# Patient Record
Sex: MALE | Race: WHITE | NOT HISPANIC OR LATINO | Employment: OTHER | URBAN - METROPOLITAN AREA
[De-identification: names, ages, dates, MRNs, and addresses within clinical notes are randomized per-mention and may not be internally consistent; named-entity substitution may affect disease eponyms.]

---

## 2017-01-17 ENCOUNTER — CLINICAL SUPPORT (OUTPATIENT)
Dept: AUDIOLOGY | Facility: CLINIC | Age: 82
End: 2017-01-17

## 2017-01-17 DIAGNOSIS — H90.3 SENSORINEURAL HEARING LOSS, BILATERAL: Primary | ICD-10-CM

## 2017-01-17 PROCEDURE — 99499 UNLISTED E&M SERVICE: CPT | Mod: S$GLB,,, | Performed by: OTOLARYNGOLOGY

## 2017-01-17 NOTE — PROGRESS NOTES
Leonid Delacruz was seen in the clinic today for a hearing aid follow-up.    Mr. Delacruz reported that his batteries were only lasting a few days. He was instructed that after he pulled the tab off the battery, to let it sit for 2 minutes before putting the battery in the hearing aid. If Mr. Delacruz is still having excessive battery drain, we will send the hearing aids in for repair since they are still under warranty. Mr. Delacruz also reported he was not hearing as well from the right hearing aid. Input was increased but Mr. Delacruz still could not tell a difference. Compression was changed to semi-linear and Mr. Delacruz stated this sounded better.     He will call the clinic for a follow-up appointment as needed.

## 2017-02-03 ENCOUNTER — OFFICE VISIT (OUTPATIENT)
Dept: INTERNAL MEDICINE | Facility: CLINIC | Age: 82
End: 2017-02-03
Payer: MEDICARE

## 2017-02-03 VITALS
SYSTOLIC BLOOD PRESSURE: 145 MMHG | WEIGHT: 206.13 LBS | DIASTOLIC BLOOD PRESSURE: 83 MMHG | TEMPERATURE: 98 F | BODY MASS INDEX: 27.92 KG/M2 | HEART RATE: 94 BPM | HEIGHT: 72 IN

## 2017-02-03 DIAGNOSIS — M47.816 LUMBAR ARTHROPATHY: ICD-10-CM

## 2017-02-03 DIAGNOSIS — I27.20 PULMONARY HYPERTENSION: ICD-10-CM

## 2017-02-03 DIAGNOSIS — N18.30 CKD (CHRONIC KIDNEY DISEASE) STAGE 3, GFR 30-59 ML/MIN: ICD-10-CM

## 2017-02-03 DIAGNOSIS — M48.56XA COMPRESSION FRACTURE OF LUMBAR SPINE, NON-TRAUMATIC, INITIAL ENCOUNTER: ICD-10-CM

## 2017-02-03 DIAGNOSIS — M51.37 DEGENERATION OF LUMBAR OR LUMBOSACRAL INTERVERTEBRAL DISC: ICD-10-CM

## 2017-02-03 DIAGNOSIS — J44.9 CHRONIC OBSTRUCTIVE PULMONARY DISEASE, UNSPECIFIED COPD TYPE: ICD-10-CM

## 2017-02-03 DIAGNOSIS — M43.10 ACQUIRED SPONDYLOLISTHESIS: ICD-10-CM

## 2017-02-03 DIAGNOSIS — E78.5 DYSLIPIDEMIA: ICD-10-CM

## 2017-02-03 DIAGNOSIS — D69.2 SENILE PURPURA: ICD-10-CM

## 2017-02-03 DIAGNOSIS — I70.0 ATHEROSCLEROSIS OF AORTA: ICD-10-CM

## 2017-02-03 DIAGNOSIS — J43.9 PULMONARY EMPHYSEMA, UNSPECIFIED EMPHYSEMA TYPE: Chronic | ICD-10-CM

## 2017-02-03 DIAGNOSIS — N40.0 BENIGN NON-NODULAR PROSTATIC HYPERPLASIA WITHOUT LOWER URINARY TRACT SYMPTOMS: ICD-10-CM

## 2017-02-03 DIAGNOSIS — K21.9 GASTROESOPHAGEAL REFLUX DISEASE WITH HIATAL HERNIA: ICD-10-CM

## 2017-02-03 DIAGNOSIS — F33.1 MDD (MAJOR DEPRESSIVE DISORDER), RECURRENT EPISODE, MODERATE: ICD-10-CM

## 2017-02-03 DIAGNOSIS — T83.490S MALFUNCTION OF PENILE PROSTHESIS, SEQUELA: ICD-10-CM

## 2017-02-03 DIAGNOSIS — K44.9 GASTROESOPHAGEAL REFLUX DISEASE WITH HIATAL HERNIA: ICD-10-CM

## 2017-02-03 DIAGNOSIS — Z00.00 ANNUAL PHYSICAL EXAM: Primary | ICD-10-CM

## 2017-02-03 DIAGNOSIS — R42 VERTIGO: ICD-10-CM

## 2017-02-03 DIAGNOSIS — I50.32 CHRONIC DIASTOLIC HEART FAILURE: ICD-10-CM

## 2017-02-03 PROCEDURE — G0009 ADMIN PNEUMOCOCCAL VACCINE: HCPCS | Mod: S$GLB,,, | Performed by: INTERNAL MEDICINE

## 2017-02-03 PROCEDURE — 99999 PR PBB SHADOW E&M-EST. PATIENT-LVL III: CPT | Mod: PBBFAC,,, | Performed by: INTERNAL MEDICINE

## 2017-02-03 PROCEDURE — 99499 UNLISTED E&M SERVICE: CPT | Mod: S$GLB,,, | Performed by: INTERNAL MEDICINE

## 2017-02-03 PROCEDURE — 99397 PER PM REEVAL EST PAT 65+ YR: CPT | Mod: S$GLB,,, | Performed by: INTERNAL MEDICINE

## 2017-02-03 PROCEDURE — 90670 PCV13 VACCINE IM: CPT | Mod: S$GLB,,, | Performed by: INTERNAL MEDICINE

## 2017-02-03 RX ORDER — NAPROXEN SODIUM 220 MG/1
81 TABLET, FILM COATED ORAL DAILY
COMMUNITY

## 2017-02-03 RX ORDER — TRAMADOL HYDROCHLORIDE 50 MG/1
TABLET ORAL
Qty: 60 TABLET | Refills: 2 | Status: SHIPPED | OUTPATIENT
Start: 2017-02-03 | End: 2017-04-27 | Stop reason: SDUPTHER

## 2017-02-03 NOTE — MR AVS SNAPSHOT
[facility]   [facility]  [place] [address]  Phone: [phone]  Fax: [phone]                  Leonid FORTE Delacruz    2/3/2017 8:20 AM   Office Visit    Description:  Male : 1934   Provider:  Sam Parkinson DO   Department:  [facility]           Reason for Visit     lethargic           Diagnoses this Visit        Comments    Annual physical exam    -  Primary     Chronic obstructive pulmonary disease, unspecified COPD type         Chronic diastolic heart failure         Pulmonary hypertension         Pulmonary emphysema, unspecified emphysema type         CKD (chronic kidney disease) stage 3, GFR 30-59 ml/min         MDD (major depressive disorder), recurrent episode, moderate         Dyslipidemia         Atherosclerosis of aorta         Senile purpura         Malfunction of penile prosthesis, sequela         Vertigo         Lumbar arthropathy         Compression fracture of lumbar spine, non-traumatic, initial encounter         Aortic sclerosis         Degeneration of lumbar or lumbosacral intervertebral disc         Acquired spondylolisthesis         Benign non-nodular prostatic hyperplasia without lower urinary tract symptoms         Gastroesophageal reflux disease with hiatal hernia                To Do List           Future Appointments        Provider Department Dept Phone    2017 10:45 AM [facility] - Laboratory [phone]    2017 11:00 AM [facility], [facility] - Specimen Lab [phone]    2017 8:40 AM aSm Parkinson DO [facility] [phone]      Goals (5 Years of Data)     None      Follow-Up and Disposition     Return in about 4 months (around 6/3/2017).       These Medications        Disp Refills Start End    tramadol (ULTRAM) 50 mg tablet 60 tablet 2 2/3/2017     Take 1-2 tabs PO q8 hrs PRN    Pharmacy: [id number] - [place] - [phone]  [facility] AT [facility] [address] [facility] Ph #: [phone]         [facility] On Call     [facility] On Call Nurse Care Line - 24/7 Assistance  Registered nurses in the [facility] On Call Center provide clinical advisement, health education, appointment booking, and other advisory services.  Call for this free service at [phone].             Medications           Message regarding Medications     Verify the changes and/or additions to your medication regime listed below are the same as discussed with your clinician today.  If any of these changes or additions are incorrect, please notify your healthcare provider.        STOP taking these medications     predniSONE (DELTASONE) 10 MG tablet Three tablets daily x 7 days, 2 tablets daily x 7 days, stop    naproxen sodium 220 mg Cap Take 220 mg by mouth as needed.           Verify that the below list of medications is an accurate representation of the medications you are currently taking.  If none reported, the list may be blank. If incorrect, please contact your healthcare provider. Carry this list with you in case of emergency.           Current Medications     albuterol (PROAIR HFA) 90 mcg/actuation inhaler 2 HFA Aerosol Inhaler Inhalation Four times a day p;rn    aspirin 81 MG Chew Take 81 mg by mouth once daily.    meclizine (ANTIVERT) 25 mg tablet TAKE 1 TABLET BY MOUTH THREE TIMES DAILY AS NEEDED FOR DIZZINESS    omeprazole (PRILOSEC) 40 MG capsule Take 1 capsule (40 mg total) by mouth 2 (two) times daily before meals.    sertraline (ZOLOFT) 100 MG tablet TAKE 1 TABLET(100 MG) BY MOUTH EVERY DAY    SYMBICORT 160-4.5 mcg/actuation HFAA INHALE 2 PUFFS INTO LUNGS EVERY 12 HOURS    SYMBICORT 160-4.5 mcg/actuation HFAA INHALE 2 PUFFS BY MOUTH TWICE DAILY    tamsulosin (FLOMAX) 0.4 mg Cp24 TAKE 1 CAPSULE BY MOUTH ONCE DAILY    tramadol (ULTRAM) 50 mg tablet Take 1-2 tabs PO q8 hrs PRN           Clinical Reference Information           Your Vitals Were      BP Pulse Temp Height Weight BMI    145/83 (BP Location: Right arm, Patient Position: Sitting, BP Method: Automatic) 94 97.7 °F (36.5 °C) (Oral) 6' (1.829 m) 93.5 kg (206 lb 2.1 oz) 27.96 kg/m2      Blood Pressure          Most Recent Value    BP  (!)  145/83      Allergies as of 2/3/2017     No Known Allergies      Immunizations Administered on Date of Encounter - 2/3/2017     Name Date Dose VIS Date Route    Pneumococcal Conjugate - 13 Valent 2/3/2017 0.5 mL 11/5/2015 Intramuscular      Orders Placed During Today's Visit      Normal Orders This Visit    Pneumococcal Conjugate Vaccine (13 Valent) (IM)     Future Labs/Procedures Expected by Expires    CBC auto differential  2/3/2017 2/3/2018    Lipid panel  2/3/2017 2/3/2018    TSH  2/3/2017 4/4/2018    Comprehensive metabolic panel  5/5/2017 5/5/2018    Urinalysis  As directed 2/3/2018      Language Assistance Services     ATTENTION: Language assistance services are available, free of charge. Please call [phone].      ATENCIÓN: Si habla ld, tiene a dunbar disposición servicios gratuitos de asistencia lingüística. Llame al [phone].     CHÚ Ý: N?u b?n nói Ti?ng Vi?t, có các d?ch v? h? tr? ngôn ng? mi?n phí dành cho b?n. G?i s? [phone].         [facility] complies with applicable Federal civil rights laws and does not discriminate on the basis of race, color, national origin, age, disability, or sex.

## 2017-02-03 NOTE — PROGRESS NOTES
Subjective:       Patient ID: Leonid Delacruz Jr. is a 82 y.o. male.    Chief Complaint: lethargic    HPI   82 y.o. Male with COPD/Emphysema, chronic diastolic heart failure, CKD III, MDD, Aortic atherosclerosis, senile purpura, vertigo, malfunction of penile prosthesis, Lumbar arthropathy with L1 compression fracture, aortic sclerosis, BPH, GERD here for annual exam.     Cholesterol: (needs)  Vaccines: Influenza (declined); Tetanus (2015); Pneumovax (2017); Zoster (declined)  Eye exam: 2016  Colonoscopy: 2011- pt declines repeat    Exercise: no  Diet: regular    Past Medical History:    Amblyopia, strabismic - Left Eye                1/7/2014      Anxiety                                                       AR (allergic rhinitis)                                        Cataract                                                      COPD (chronic obstructive pulmonary disease)                  Gastroesophageal reflux disease with hiatal he*               History of bleeding peptic ulcer                5/02/2010     History of gastritis                            9/22/2009       Comment:with hemorrhage    MDD (major depressive disorder), recurrent epi* 3/3/2016      Osteoarthritis                                                PUD (peptic ulcer disease)                                      Comment:stomach  Past Surgical History:    CATARACT EXTRACTION W/  INTRAOCULAR LENS IMPLA*  8/20/2001*    LAMINOTOMY                                       5/20/2003       Comment:Left L4-L5    APPENDECTOMY                                                   TONSILLECTOMY, ADENOIDECTOMY                                   PENILE PROSTHESIS IMPLANT                                      VASECTOMY                                                      ROTATOR CUFF REPAIR                                              Comment:Bilateral    COLONOSCOPY                                      6/06/2011     ESOPHAGOGASTRODUODENOSCOPY                        5/02/2010*  Social History    Marital status:              Spouse name: Ting                 Years of education:                 Number of children: 2             Occupational History  Occupation          Employer            Comment               Retired                                     Social History Main Topics    Smoking status: Never Smoker                                                                Smokeless status: Never Used                        Alcohol use: Yes           4.2 oz/week       7 Cans of beer per week       Comment: 7-9 beers a week    Drug use: No              Sexual activity: Yes               Partners with: Female    Review of patient's allergies indicates:  No Known Allergies  Mr. Delacruz does not currently have medications on file.    Review of Systems   Constitutional: Negative for activity change, appetite change, chills, diaphoresis, fatigue, fever and unexpected weight change.   HENT: Negative for congestion, mouth sores, postnasal drip, rhinorrhea, sinus pressure, sneezing, sore throat, trouble swallowing and voice change.    Eyes: Negative for discharge, itching and visual disturbance.   Respiratory: Negative for cough, chest tightness, shortness of breath and wheezing.    Cardiovascular: Negative for chest pain, palpitations and leg swelling.   Gastrointestinal: Negative for abdominal pain, blood in stool, constipation, diarrhea, nausea and vomiting.   Endocrine: Negative for cold intolerance and heat intolerance.   Genitourinary: Negative for difficulty urinating, dysuria, flank pain, hematuria and urgency.   Musculoskeletal: Negative for arthralgias, back pain, myalgias and neck pain.   Skin: Negative for rash and wound.   Allergic/Immunologic: Negative for environmental allergies and food allergies.   Neurological: Negative for dizziness, tremors, seizures, syncope, weakness and headaches.   Hematological: Negative for adenopathy. Does not bruise/bleed easily.    Psychiatric/Behavioral: Negative for confusion, sleep disturbance and suicidal ideas. The patient is not nervous/anxious.        Objective:      Physical Exam   Constitutional: He is oriented to person, place, and time. He appears well-developed and well-nourished. No distress.   HENT:   Head: Normocephalic and atraumatic.   Right Ear: External ear normal.   Left Ear: External ear normal.   Nose: Nose normal.   Mouth/Throat: Oropharynx is clear and moist. No oropharyngeal exudate.   Eyes: Conjunctivae and EOM are normal. Pupils are equal, round, and reactive to light. Right eye exhibits no discharge. Left eye exhibits no discharge. No scleral icterus.   Neck: Normal range of motion. Neck supple. No JVD present. No thyromegaly present.   Cardiovascular: Normal rate, regular rhythm, normal heart sounds and intact distal pulses.    No murmur heard.  Pulmonary/Chest: Effort normal and breath sounds normal. No respiratory distress. He has no wheezes. He has no rales.   Abdominal: Soft. Bowel sounds are normal. He exhibits no distension. There is no tenderness. There is no guarding.   Musculoskeletal: He exhibits no edema.   Lymphadenopathy:     He has no cervical adenopathy.   Neurological: He is alert and oriented to person, place, and time.   Skin: Skin is warm and dry. No rash noted. He is not diaphoretic. No pallor.   Psychiatric: He has a normal mood and affect. Judgment normal.       Assessment:       1. Annual physical exam    2. Chronic obstructive pulmonary disease, unspecified COPD type    3. Chronic diastolic heart failure    4. Pulmonary hypertension    5. Pulmonary emphysema, unspecified emphysema type    6. CKD (chronic kidney disease) stage 3, GFR 30-59 ml/min    7. MDD (major depressive disorder), recurrent episode, moderate    8. Dyslipidemia    9. Atherosclerosis of aorta    10. Senile purpura    11. Malfunction of penile prosthesis, sequela    12. Vertigo    13. Lumbar arthropathy    14. Compression  fracture of lumbar spine, non-traumatic, initial encounter    15. Aortic sclerosis    16. Degeneration of lumbar or lumbosacral intervertebral disc    17. Acquired spondylolisthesis    18. Benign non-nodular prostatic hyperplasia without lower urinary tract symptoms    19. Gastroesophageal reflux disease with hiatal hernia        Plan:    Complete blood work, UA   Vaccines: Influenza (declined); Tetanus (2015); Pneumovax (2017); Zoster (declined)   Eye exam: 2016   Colonoscopy: 2011- pt declines repeat     1. COPD- fair control on Symbicort BID   2. Chronic diastolic heart failure with mule pulm HTN- stable, no S/S of heart failure   3. CKD III- stable, no NSAIDs   4. MDD- stable on Zoloft 100 mg daily   5. BPPV- fair control on Meclizine   6. Aortic atherosclerosis- stable on ASA   7. Senile purpura- stable, continue to monitor    8. Malfunction of penile prosthesis- stable, followed by Urology   9. Lumbar arthropathy/ L1 compression fracture- stable on Tramadol PRN   10. Aortic sclerosis- stable, continue to monitor   11. BPH- stable on Flomax  12. GERD- stable on Prilosec  13. F/u in 4 months or PRN

## 2017-02-04 ENCOUNTER — LAB VISIT (OUTPATIENT)
Dept: LAB | Facility: HOSPITAL | Age: 82
End: 2017-02-04
Attending: INTERNAL MEDICINE
Payer: MEDICARE

## 2017-02-04 DIAGNOSIS — F33.1 MDD (MAJOR DEPRESSIVE DISORDER), RECURRENT EPISODE, MODERATE: ICD-10-CM

## 2017-02-04 DIAGNOSIS — J44.9 CHRONIC OBSTRUCTIVE PULMONARY DISEASE, UNSPECIFIED COPD TYPE: ICD-10-CM

## 2017-02-04 DIAGNOSIS — J43.9 PULMONARY EMPHYSEMA, UNSPECIFIED EMPHYSEMA TYPE: Chronic | ICD-10-CM

## 2017-02-04 DIAGNOSIS — E78.5 DYSLIPIDEMIA: ICD-10-CM

## 2017-02-04 DIAGNOSIS — N18.30 CKD (CHRONIC KIDNEY DISEASE) STAGE 3, GFR 30-59 ML/MIN: ICD-10-CM

## 2017-02-04 LAB
ALBUMIN SERPL BCP-MCNC: 3.6 G/DL
ALP SERPL-CCNC: 94 U/L
ALT SERPL W/O P-5'-P-CCNC: 14 U/L
ANION GAP SERPL CALC-SCNC: 6 MMOL/L
AST SERPL-CCNC: 23 U/L
BASOPHILS # BLD AUTO: 0.03 K/UL
BASOPHILS NFR BLD: 0.4 %
BILIRUB SERPL-MCNC: 0.5 MG/DL
BUN SERPL-MCNC: 17 MG/DL
CALCIUM SERPL-MCNC: 8.8 MG/DL
CHLORIDE SERPL-SCNC: 104 MMOL/L
CHOLEST/HDLC SERPL: 2.2 {RATIO}
CO2 SERPL-SCNC: 26 MMOL/L
CREAT SERPL-MCNC: 1.6 MG/DL
DIFFERENTIAL METHOD: ABNORMAL
EOSINOPHIL # BLD AUTO: 0.1 K/UL
EOSINOPHIL NFR BLD: 2 %
ERYTHROCYTE [DISTWIDTH] IN BLOOD BY AUTOMATED COUNT: 16.8 %
EST. GFR  (AFRICAN AMERICAN): 45.7 ML/MIN/1.73 M^2
EST. GFR  (NON AFRICAN AMERICAN): 39.5 ML/MIN/1.73 M^2
GLUCOSE SERPL-MCNC: 102 MG/DL
HCT VFR BLD AUTO: 37.6 %
HDL/CHOLESTEROL RATIO: 45.9 %
HDLC SERPL-MCNC: 181 MG/DL
HDLC SERPL-MCNC: 83 MG/DL
HGB BLD-MCNC: 12.1 G/DL
LDLC SERPL CALC-MCNC: 82.8 MG/DL
LYMPHOCYTES # BLD AUTO: 2.2 K/UL
LYMPHOCYTES NFR BLD: 30.9 %
MCH RBC QN AUTO: 28.7 PG
MCHC RBC AUTO-ENTMCNC: 32.2 %
MCV RBC AUTO: 89 FL
MONOCYTES # BLD AUTO: 0.8 K/UL
MONOCYTES NFR BLD: 11.6 %
NEUTROPHILS # BLD AUTO: 3.8 K/UL
NEUTROPHILS NFR BLD: 54.8 %
NONHDLC SERPL-MCNC: 98 MG/DL
PLATELET # BLD AUTO: 324 K/UL
PMV BLD AUTO: 9.7 FL
POTASSIUM SERPL-SCNC: 4.7 MMOL/L
PROT SERPL-MCNC: 7.3 G/DL
RBC # BLD AUTO: 4.22 M/UL
SODIUM SERPL-SCNC: 136 MMOL/L
T4 FREE SERPL-MCNC: 0.79 NG/DL
TRIGL SERPL-MCNC: 76 MG/DL
TSH SERPL DL<=0.005 MIU/L-ACNC: 4.01 UIU/ML
WBC # BLD AUTO: 6.99 K/UL

## 2017-02-04 PROCEDURE — 80061 LIPID PANEL: CPT

## 2017-02-04 PROCEDURE — 84443 ASSAY THYROID STIM HORMONE: CPT

## 2017-02-04 PROCEDURE — 84439 ASSAY OF FREE THYROXINE: CPT

## 2017-02-04 PROCEDURE — 80053 COMPREHEN METABOLIC PANEL: CPT

## 2017-02-04 PROCEDURE — 36415 COLL VENOUS BLD VENIPUNCTURE: CPT | Mod: PO

## 2017-02-04 PROCEDURE — 85025 COMPLETE CBC W/AUTO DIFF WBC: CPT

## 2017-02-24 DIAGNOSIS — J43.8 OTHER EMPHYSEMA: Primary | ICD-10-CM

## 2017-03-22 DIAGNOSIS — J44.9 CHRONIC OBSTRUCTIVE PULMONARY DISEASE, UNSPECIFIED COPD TYPE: Primary | ICD-10-CM

## 2017-03-27 ENCOUNTER — HOSPITAL ENCOUNTER (OUTPATIENT)
Dept: RADIOLOGY | Facility: HOSPITAL | Age: 82
Discharge: HOME | End: 2017-03-27
Attending: INTERNAL MEDICINE
Payer: MEDICARE

## 2017-03-27 ENCOUNTER — OFFICE VISIT (OUTPATIENT)
Dept: PULMONOLOGY | Facility: CLINIC | Age: 82
End: 2017-03-27
Payer: MEDICARE

## 2017-03-27 ENCOUNTER — HOSPITAL ENCOUNTER (OUTPATIENT)
Dept: PULMONOLOGY | Facility: CLINIC | Age: 82
Discharge: HOME | End: 2017-03-27
Payer: MEDICARE

## 2017-03-27 VITALS
SYSTOLIC BLOOD PRESSURE: 132 MMHG | DIASTOLIC BLOOD PRESSURE: 70 MMHG | BODY MASS INDEX: 29.35 KG/M2 | WEIGHT: 205 LBS | HEART RATE: 95 BPM | OXYGEN SATURATION: 93 % | HEIGHT: 70 IN

## 2017-03-27 DIAGNOSIS — J44.9 CHRONIC OBSTRUCTIVE PULMONARY DISEASE, UNSPECIFIED COPD TYPE: ICD-10-CM

## 2017-03-27 DIAGNOSIS — J43.2 CENTRILOBULAR EMPHYSEMA: Primary | ICD-10-CM

## 2017-03-27 LAB
PRE FEV1 FVC: 67
PRE FEV1: 1.51
PRE FVC: 2.24
PREDICTED FEV1 FVC: 77
PREDICTED FEV1: 2.93
PREDICTED FVC: 3.76

## 2017-03-27 PROCEDURE — 99999 PR PBB SHADOW E&M-EST. PATIENT-LVL III: CPT | Mod: PBBFAC,,, | Performed by: INTERNAL MEDICINE

## 2017-03-27 PROCEDURE — 1125F AMNT PAIN NOTED PAIN PRSNT: CPT | Mod: S$GLB,,, | Performed by: INTERNAL MEDICINE

## 2017-03-27 PROCEDURE — 99499 UNLISTED E&M SERVICE: CPT | Mod: S$GLB,,, | Performed by: INTERNAL MEDICINE

## 2017-03-27 PROCEDURE — 1159F MED LIST DOCD IN RCRD: CPT | Mod: S$GLB,,, | Performed by: INTERNAL MEDICINE

## 2017-03-27 PROCEDURE — 99214 OFFICE O/P EST MOD 30 MIN: CPT | Mod: S$GLB,,, | Performed by: INTERNAL MEDICINE

## 2017-03-27 PROCEDURE — 71020 XR CHEST PA AND LATERAL: CPT | Mod: TC

## 2017-03-27 PROCEDURE — 1160F RVW MEDS BY RX/DR IN RCRD: CPT | Mod: S$GLB,,, | Performed by: INTERNAL MEDICINE

## 2017-03-27 PROCEDURE — 94010 BREATHING CAPACITY TEST: CPT | Mod: S$GLB,,, | Performed by: INTERNAL MEDICINE

## 2017-03-27 PROCEDURE — 71020 XR CHEST PA AND LATERAL: CPT | Mod: 26,,, | Performed by: RADIOLOGY

## 2017-03-27 PROCEDURE — 1157F ADVNC CARE PLAN IN RCRD: CPT | Mod: S$GLB,,, | Performed by: INTERNAL MEDICINE

## 2017-03-28 DIAGNOSIS — R93.89 ABNORMAL CHEST CT: ICD-10-CM

## 2017-03-28 DIAGNOSIS — J44.9 CHRONIC OBSTRUCTIVE PULMONARY DISEASE, UNSPECIFIED COPD TYPE: Primary | ICD-10-CM

## 2017-03-28 NOTE — PROGRESS NOTES
Subjective:       Patient ID: Leonid Delacruz Jr. is a 82 y.o. male.    Chief Complaint: COPD and Shortness of Breath    HPI  81 yo male with COPD comes for a periodic visit. He is doing well, no exacerbations of his COPD. Last visit May, 2016. He has  Had  No problems with his breathing. He has a problem with vertigo and is unsteady and has fallen several times requiring sutures. He is a bit better at the current time. He has not had a chest x-ray is years. Will get one today. Fev-1 virtually identicalto last May. 1.51 liters.68% of FVC      No flowsheet data found.]  Review of Systems   Constitutional: Negative.    HENT: Negative.    Eyes: Negative.    Respiratory: Negative.         Moderate but stable COPD   Cardiovascular: Negative.         Diastolic dysfunction.   Genitourinary: Negative.    Musculoskeletal: Negative.    Skin: Negative.    Gastrointestinal: Negative.         GERD/s tends to sleep on his right side   Neurological: Negative.    Psychiatric/Behavioral: Negative.        Objective:      Physical Exam   Constitutional: He is oriented to person, place, and time. He appears well-developed and well-nourished.   HENT:   Head: Normocephalic and atraumatic.   Right Ear: External ear normal.   Left Ear: External ear normal.   Eyes: Conjunctivae and EOM are normal. Pupils are equal, round, and reactive to light.   Neck: Normal range of motion. Neck supple.   Cardiovascular: Normal rate, regular rhythm and normal heart sounds.    Pulmonary/Chest: Effort normal and breath sounds normal.   Decreased breath sounds without wheezes Sa02: 93%   Abdominal: Soft. Bowel sounds are normal.   Musculoskeletal: Normal range of motion.   Neurological: He is alert and oriented to person, place, and time. He has normal reflexes.   Skin: Skin is warm and dry.   Psychiatric: He has a normal mood and affect. His behavior is normal. Judgment and thought content normal.           Assessment:       1. Centrilobular emphysema         Outpatient Encounter Prescriptions as of 3/27/2017   Medication Sig Dispense Refill    albuterol (PROAIR HFA) 90 mcg/actuation inhaler 2 HFA Aerosol Inhaler Inhalation Four times a day p;rn 3 Inhaler 3    aspirin 81 MG Chew Take 81 mg by mouth once daily.      meclizine (ANTIVERT) 25 mg tablet TAKE 1 TABLET BY MOUTH THREE TIMES DAILY AS NEEDED FOR DIZZINESS 45 tablet 0    omeprazole (PRILOSEC) 40 MG capsule Take 1 capsule (40 mg total) by mouth 2 (two) times daily before meals. 60 capsule 6    sertraline (ZOLOFT) 100 MG tablet TAKE 1 TABLET(100 MG) BY MOUTH EVERY DAY 90 tablet 3    SYMBICORT 160-4.5 mcg/actuation HFAA INHALE 2 PUFFS INTO LUNGS EVERY 12 HOURS 10.2 g 0    tamsulosin (FLOMAX) 0.4 mg Cp24 TAKE 1 CAPSULE BY MOUTH ONCE DAILY 90 capsule 3    tramadol (ULTRAM) 50 mg tablet Take 1-2 tabs PO q8 hrs PRN 60 tablet 2    [DISCONTINUED] SYMBICORT 160-4.5 mcg/actuation HFAA INHALE 2 PUFFS BY MOUTH TWICE DAILY 10.2 g 0     No facility-administered encounter medications on file as of 3/27/2017.      Orders Placed This Encounter   Procedures    X-Ray Chest PA And Lateral     Standing Status:   Future     Standing Expiration Date:   3/27/2018     Order Specific Question:   May the Radiologist modify the order per protocol to meet the clinical needs of the patient?     Answer:   Yes     Plan:             Chest -ray shows hyperinflation. The radiologist refers to volume loss at right base, I don't appreciate any. But will get  CT because of their expressed concern.

## 2017-04-03 DIAGNOSIS — K44.9 GASTROESOPHAGEAL REFLUX DISEASE WITH HIATAL HERNIA: ICD-10-CM

## 2017-04-03 DIAGNOSIS — K21.9 GASTROESOPHAGEAL REFLUX DISEASE WITH HIATAL HERNIA: ICD-10-CM

## 2017-04-03 RX ORDER — OMEPRAZOLE 40 MG/1
CAPSULE, DELAYED RELEASE ORAL
Qty: 60 CAPSULE | Refills: 5 | Status: SHIPPED | OUTPATIENT
Start: 2017-04-03 | End: 2018-04-05 | Stop reason: SDUPTHER

## 2017-04-03 RX ORDER — BUDESONIDE AND FORMOTEROL FUMARATE DIHYDRATE 160; 4.5 UG/1; UG/1
AEROSOL RESPIRATORY (INHALATION)
Qty: 10.2 G | Refills: 0 | Status: SHIPPED | OUTPATIENT
Start: 2017-04-03 | End: 2017-06-05 | Stop reason: SDUPTHER

## 2017-04-19 ENCOUNTER — HOSPITAL ENCOUNTER (EMERGENCY)
Facility: HOSPITAL | Age: 82
Discharge: HOME | End: 2017-04-19
Attending: EMERGENCY MEDICINE | Admitting: EMERGENCY MEDICINE
Payer: MEDICARE

## 2017-04-19 VITALS
OXYGEN SATURATION: 96 % | HEIGHT: 71 IN | RESPIRATION RATE: 18 BRPM | BODY MASS INDEX: 28 KG/M2 | DIASTOLIC BLOOD PRESSURE: 79 MMHG | TEMPERATURE: 98 F | WEIGHT: 200 LBS | SYSTOLIC BLOOD PRESSURE: 194 MMHG | HEART RATE: 82 BPM

## 2017-04-19 DIAGNOSIS — M54.9 BACK PAIN: Primary | ICD-10-CM

## 2017-04-19 PROCEDURE — 99283 EMERGENCY DEPT VISIT LOW MDM: CPT

## 2017-04-19 PROCEDURE — 99284 EMERGENCY DEPT VISIT MOD MDM: CPT | Mod: ,,, | Performed by: EMERGENCY MEDICINE

## 2017-04-19 PROCEDURE — 25000003 PHARM REV CODE 250: Performed by: EMERGENCY MEDICINE

## 2017-04-19 RX ORDER — ACETAMINOPHEN 500 MG
1000 TABLET ORAL
Status: COMPLETED | OUTPATIENT
Start: 2017-04-19 | End: 2017-04-19

## 2017-04-19 RX ADMIN — ACETAMINOPHEN 1000 MG: 500 TABLET ORAL at 07:04

## 2017-04-19 NOTE — ED TRIAGE NOTES
Patient came in with a lower back pain from a trip and fall on 4/6/2017. Patient denies any LOC or hitting his head.

## 2017-04-19 NOTE — DISCHARGE INSTRUCTIONS
Back Pain (Acute or Chronic)    Back pain is one of the most common problems. The good news is that most people feel better in 1 to 2 weeks, and most of the rest in 1 to 2 months. Most people can remain active.  People experience and describe pain differently; not everyone is the same.  · The pain can be sharp, stabbing, shooting, aching, cramping or burning.  · Movement, standing, bending, lifting, sitting, or walking may worsen pain.  · It can be localized to one spot or area, or it can be more generalized.  · It can spread or radiate upwards, to the front, or go down your arms or legs (sciatica).  · It can cause muscle spasm.  Most of the time, mechanical problems with the muscles or spine cause the pain. Mechanical problems are usually caused by an injury to the muscles or ligaments. While illness can cause back pain, it is usually not caused by a serious illness. Mechanical problems include:   · Physical activity such as sports, exercise, work, or normal activity  · Overexertion, lifting, pushing, pulling incorrectly or too aggressively  · Sudden twisting, bending, or stretching from an accident, or accidental movement  · Poor posture  · Stretching or moving wrong, without noticing pain at the time  · Poor coordination, lack of regular exercise (check with your doctor about this)  · Spinal disc disease or arthritis  · Stress  Pain can also be related to pregnancy, or illness like appendicitis, bladder or kidney infections, pelvic infections, and many other things.  Acute back pain usually gets better in 1 to 2 weeks. Back pain related to disk disease, arthritis in the spinal joints or spinal stenosis (narrowing of the spinal canal) can become chronic and last for months or years.  Unless you had a physical injury (for example, a car accident or fall) X-rays are usually not needed for the initial evaluation of back pain. If pain continues and does not respond to medical treatment, X-rays and other tests may be  needed.  Home care  Try these home care recommendations:  · When in bed, try to find a position of comfort. A firm mattress is best. Try lying flat on your back with pillows under your knees. You can also try lying on your side with your knees bent up towards your chest and a pillow between your knees.  · At first, do not try to stretch out the sore spots. If there is a strain, it is not like the good soreness you get after exercising without an injury. In this case, stretching may make it worse.  · Avoid prolong sitting, long car rides, or travel. This puts more stress on the lower back than standing or walking.  · During the first 24 to 72 hours after an acute injury or flare up of chronic back pain, apply an ice pack to the painful area for 20 minutes and then remove it for 20 minutes. Do this over a period of 60 to 90 minutes or several times a day. This will reduce swelling and pain. Wrap the ice pack in a thin towel or plastic to protect your skin.  · You can start with ice, then switch to heat. Heat (hot shower, hot bath, or heating pad) reduces pain and works well for muscle spasms. Heat can be applied to the painful area for 20 minutes then remove it for 20 minutes. Do this over a period of 60 to 90 minutes or several times a day. Do not sleep on a heating pad. It can lead to skin burns or tissue damage.  · You can alternate ice and heat therapy. Talk with your doctor about the best treatment for your back pain.  · Therapeutic massage can help relax the back muscles without stretching them.  · Be aware of safe lifting methods and do not lift anything without stretching first.  Medicines  Talk to your doctor before using medicine, especially if you have other medical problems or are taking other medicines.  · You may use over-the-counter medicine as directed on the bottle to control pain, unless another pain medicine was prescribed. If you have chronic conditions like diabetes, liver or kidney disease,  stomach ulcers, or gastrointestinal bleeding, or are taking blood thinners, talk to your doctor before taking any medicine.  · Be careful if you are given a prescription medicines, narcotics, or medicine for muscle spasms. They can cause drowsiness, affect your coordination, reflexes, and judgement. Do not drive or operate heavy machinery.  Follow-up care  Follow up with your healthcare provider, or as advised.   A radiologist will review any X-rays that were taken. Your provide will notify you of any new findings that may affect your care.  Call 911  Call emergency services if any of the following occur:  · Trouble breathing  · Confusion  · Very drowsy or trouble awakening  · Fainting or loss of consciousness  · Rapid or very slow heart rate  · Loss of bowel or bladder control  When to seek medical advice  Call your healthcare provider right away if any of these occur:   · Pain becomes worse or spreads to your legs  · Weakness or numbness in one or both legs  · Numbness in the groin or genital area  Date Last Reviewed: 7/1/2016  © 2538-3145 [employer]. [address] [place] [address], [place] [address]. All rights reserved. This information is not intended as a substitute for professional medical care. Always follow your healthcare professional's instructions.

## 2017-04-19 NOTE — ED NOTES
Patient identifiers verified and correct for Leonid Delacruz Jr..    LOC: The patient is awake, alert and aware of environment with an appropriate affect, the patient is oriented x 3 and speaking appropriately.  APPEARANCE: Patient resting comfortably and in no acute distress, patient is clean and well groomed, patient's clothing is properly fastened.  SKIN: The skin is warm and dry, color consistent with ethnicity, patient has normal skin turgor and moist mucus membranes, skin intact, no breakdown noted. Old scratches noted, healing well.   MUSCULOSKELETAL: Patient moving all extremities spontaneously, no obvious swelling or deformities noted.

## 2017-04-19 NOTE — ED AVS SNAPSHOT
[facility]  [address]  [facility] [address]               Leonid Delacruz Jr.   2017  7:17 AM   ED    Description:  Male : 1934   Department:  [facility]           Your Care was Coordinated By:     Provider Role From To    Debora Myers MD Attending Provider 17 0723 --      Reason for Visit     Back Pain           Diagnoses this Visit        Comments    Back pain    -  Primary       ED Disposition     None           To Do List           Follow-up Information     Schedule an appointment as soon as possible for a visit with Sam Parkinson DO.    Specialty:  Internal Medicine    Contact information:     [facility] [address]  [phone]        [facility]     [facility] -  Assistance  Unless otherwise directed by your provider, please contact [facility], our nurse care line that is available for  assistance.     Registered nurses in the [facility] provide: appointment scheduling, clinical advisement, health education, and other advisory services.  Call: [phone] (toll free)               Medications           Message regarding Medications     Verify the changes and/or additions to your medication regime listed below are the same as discussed with your clinician today.  If any of these changes or additions are incorrect, please notify your healthcare provider.        These medications were administered today        Dose Freq    acetaminophen tablet 1,000 mg 1,000 mg ED 1 Time    Sig: Take 2 tablets (1,000 mg total) by mouth ED 1 Time.    Class: Normal    Route: Oral           Verify that the below list of medications is an accurate representation of the medications you are currently taking.  If none reported, the list may be blank. If incorrect, please contact your healthcare provider. Carry this list with you in case of emergency.          "  Current Medications     albuterol (PROAIR HFA) 90 mcg/actuation inhaler 2 HFA Aerosol Inhaler Inhalation Four times a day p;rn    aspirin 81 MG Chew Take 81 mg by mouth once daily.    meclizine (ANTIVERT) 25 mg tablet TAKE 1 TABLET BY MOUTH THREE TIMES DAILY AS NEEDED FOR DIZZINESS    omeprazole (PRILOSEC) 40 MG capsule TAKE 1 CAPSULE BY MOUTH TWICE DAILY BEFORE MEALS    sertraline (ZOLOFT) 100 MG tablet TAKE 1 TABLET(100 MG) BY MOUTH EVERY DAY    SYMBICORT 160-4.5 mcg/actuation HFAA INHALE 2 PUFFS INTO LUNGS EVERY 12 HOURS    SYMBICORT 160-4.5 mcg/actuation HFAA INHALE 2 PUFFS BY MOUTH TWICE DAILY    tamsulosin (FLOMAX) 0.4 mg Cp24 TAKE 1 CAPSULE BY MOUTH ONCE DAILY    tramadol (ULTRAM) 50 mg tablet Take 1-2 tabs PO q8 hrs PRN           Clinical Reference Information           Your Vitals Were     BP Pulse Temp Resp Height Weight    194/79 82 97.8 °F (36.6 °C) (Oral) 18 5' 10.5" (1.791 m) 90.7 kg (200 lb)    SpO2 BMI             96% 28.29 kg/m2         Allergies as of 4/19/2017     No Known Allergies      Immunizations Administered on Date of Encounter - 4/19/2017     None      ED Micro, Lab, POCT     None      ED Imaging Orders     Start Ordered       Status Ordering Provider    04/19/17 0733 04/19/17 0733  X-Ray Pelvis Routine AP  1 time imaging      Final result     04/19/17 0733 04/19/17 0733  X-Ray Sacrum And Coccyx  1 time imaging      Final result         Discharge Instructions         Back Pain (Acute or Chronic)    Back pain is one of the most common problems. The good news is that most people feel better in 1 to 2 weeks, and most of the rest in 1 to 2 months. Most people can remain active.  People experience and describe pain differently; not everyone is the same.  · The pain can be sharp, stabbing, shooting, aching, cramping or burning.  · Movement, standing, bending, lifting, sitting, or walking may worsen pain.  · It can be localized to one spot or area, or it can be more generalized.  · It can " spread or radiate upwards, to the front, or go down your arms or legs (sciatica).  · It can cause muscle spasm.  Most of the time, mechanical problems with the muscles or spine cause the pain. Mechanical problems are usually caused by an injury to the muscles or ligaments. While illness can cause back pain, it is usually not caused by a serious illness. Mechanical problems include:   · Physical activity such as sports, exercise, work, or normal activity  · Overexertion, lifting, pushing, pulling incorrectly or too aggressively  · Sudden twisting, bending, or stretching from an accident, or accidental movement  · Poor posture  · Stretching or moving wrong, without noticing pain at the time  · Poor coordination, lack of regular exercise (check with your doctor about this)  · Spinal disc disease or arthritis  · Stress  Pain can also be related to pregnancy, or illness like appendicitis, bladder or kidney infections, pelvic infections, and many other things.  Acute back pain usually gets better in 1 to 2 weeks. Back pain related to disk disease, arthritis in the spinal joints or spinal stenosis (narrowing of the spinal canal) can become chronic and last for months or years.  Unless you had a physical injury (for example, a car accident or fall) X-rays are usually not needed for the initial evaluation of back pain. If pain continues and does not respond to medical treatment, X-rays and other tests may be needed.  Home care  Try these home care recommendations:  · When in bed, try to find a position of comfort. A firm mattress is best. Try lying flat on your back with pillows under your knees. You can also try lying on your side with your knees bent up towards your chest and a pillow between your knees.  · At first, do not try to stretch out the sore spots. If there is a strain, it is not like the good soreness you get after exercising without an injury. In this case, stretching may make it worse.  · Avoid prolong  sitting, long car rides, or travel. This puts more stress on the lower back than standing or walking.  · During the first 24 to 72 hours after an acute injury or flare up of chronic back pain, apply an ice pack to the painful area for 20 minutes and then remove it for 20 minutes. Do this over a period of 60 to 90 minutes or several times a day. This will reduce swelling and pain. Wrap the ice pack in a thin towel or plastic to protect your skin.  · You can start with ice, then switch to heat. Heat (hot shower, hot bath, or heating pad) reduces pain and works well for muscle spasms. Heat can be applied to the painful area for 20 minutes then remove it for 20 minutes. Do this over a period of 60 to 90 minutes or several times a day. Do not sleep on a heating pad. It can lead to skin burns or tissue damage.  · You can alternate ice and heat therapy. Talk with your doctor about the best treatment for your back pain.  · Therapeutic massage can help relax the back muscles without stretching them.  · Be aware of safe lifting methods and do not lift anything without stretching first.  Medicines  Talk to your doctor before using medicine, especially if you have other medical problems or are taking other medicines.  · You may use over-the-counter medicine as directed on the bottle to control pain, unless another pain medicine was prescribed. If you have chronic conditions like diabetes, liver or kidney disease, stomach ulcers, or gastrointestinal bleeding, or are taking blood thinners, talk to your doctor before taking any medicine.  · Be careful if you are given a prescription medicines, narcotics, or medicine for muscle spasms. They can cause drowsiness, affect your coordination, reflexes, and judgement. Do not drive or operate heavy machinery.  Follow-up care  Follow up with your healthcare provider, or as advised.   A radiologist will review any X-rays that were taken. Your provide will notify you of any new findings that  may affect your care.  Call 911  Call emergency services if any of the following occur:  · Trouble breathing  · Confusion  · Very drowsy or trouble awakening  · Fainting or loss of consciousness  · Rapid or very slow heart rate  · Loss of bowel or bladder control  When to seek medical advice  Call your healthcare provider right away if any of these occur:   · Pain becomes worse or spreads to your legs  · Weakness or numbness in one or both legs  · Numbness in the groin or genital area  Date Last Reviewed: 7/1/2016  © 2242-0213 [employer]. [address] [place] [address]. All rights reserved. This information is not intended as a substitute for professional medical care. Always follow your healthcare professional's instructions.          Your Scheduled Appointments     Jun 05, 2017  8:40 AM CDT   Established Patient Visit with Sam Parkinson DO   [place] [facility] ([facility])    [address] [facility] [address]   [phone]               [facility] complies with applicable Federal civil rights laws and does not discriminate on the basis of race, color, national origin, age, disability, or sex.        Language Assistance Services     ATTENTION: Language assistance services are available, free of charge. Please call [phone].      ATENCIÓN: Si habla ld, tiene a dunbar disposición servicios gratuitos de asistencia lingüística. Llame al [phone].     CHÚ Ý: N?u b?n nói Ti?ng Vi?t, có các d?ch v? h? tr? ngôn ng? mi?n phí dành cho b?n. G?i s? [phone].

## 2017-04-19 NOTE — ED PROVIDER NOTES
Encounter Date: 4/19/2017    SCRIBE #1 NOTE: I, Aliya Duran, am scribing for, and in the presence of,  Dr. Myers. I have scribed the following portions of the note - Other sections scribed: H&P,ROS.       History     Chief Complaint   Patient presents with    Back Pain     fell april 6, now can't walk,      Review of patient's allergies indicates:  No Known Allergies  HPI Comments: Time seen by provider: 7:27 AM    This is a 82 y.o. male with a PMHx of osteoarthritis, anxiety, peptic ulcer disease, COPD and a PSHx of appendectomy who presents with complaint of worsening lower back pain s/p mechanical fall 13 days ago. Pain is exacerbated with standing. Patient reports he has to ambulate with a walker secondary to pain, which is not his baseline. Patient believes there was head trauma. He denies urinary or bowel symptoms and numbness. He admits to taking Aleve and Advil without improvement. He has not been evaluated with X-Rays since the fall.       The history is provided by the patient.     Past Medical History:   Diagnosis Date    Amblyopia, strabismic - Left Eye 1/7/2014    Anxiety     AR (allergic rhinitis)     Cataract     COPD (chronic obstructive pulmonary disease)     Gastroesophageal reflux disease with hiatal hernia     History of bleeding peptic ulcer 5/02/2010    History of gastritis 9/22/2009    with hemorrhage    MDD (major depressive disorder), recurrent episode, moderate 3/3/2016    Osteoarthritis     PUD (peptic ulcer disease)     stomach     Past Surgical History:   Procedure Laterality Date    APPENDECTOMY      CATARACT EXTRACTION W/  INTRAOCULAR LENS IMPLANT  8/20/2001, 10/01/2001    COLONOSCOPY  6/06/2011    ESOPHAGOGASTRODUODENOSCOPY  5/02/2010, 8/11/2010, 3/21/2011, 6/06/2011    LAMINOTOMY  5/20/2003    Left L4-L5    PENILE PROSTHESIS IMPLANT      ROTATOR CUFF REPAIR      Bilateral    TONSILLECTOMY, ADENOIDECTOMY      VASECTOMY       Family History   Problem Relation  Age of Onset    No Known Problems Sister     No Known Problems Daughter     No Known Problems Son     Amblyopia Neg Hx     Blindness Neg Hx     Cancer Neg Hx     Cataracts Neg Hx     Diabetes Neg Hx     Glaucoma Neg Hx     Hypertension Neg Hx     Macular degeneration Neg Hx     Retinal detachment Neg Hx     Strabismus Neg Hx     Stroke Neg Hx     Thyroid disease Neg Hx     Heart disease Neg Hx      Social History   Substance Use Topics    Smoking status: Never Smoker    Smokeless tobacco: Never Used    Alcohol use 4.2 oz/week     7 Cans of beer per week      Comment: 7-9 beers a week     Review of Systems   Constitutional: Negative for fever.   Gastrointestinal: Negative for constipation.   Genitourinary: Negative for decreased urine volume, difficulty urinating, dysuria and hematuria.   Musculoskeletal: Positive for back pain and gait problem (secondary to pain).   Skin: Negative for rash.   Neurological: Negative for weakness and numbness.       Physical Exam   Initial Vitals   BP Pulse Resp Temp SpO2   04/19/17 0655 04/19/17 0655 04/19/17 0655 04/19/17 0655 04/19/17 0655   194/79 82 18 97.8 °F (36.6 °C) 96 %     Physical Exam    Nursing note and vitals reviewed.  Constitutional: He appears well-developed and well-nourished. No distress.   Patient is comfortable   HENT:   Head: Normocephalic.   Mouth/Throat: Oropharynx is clear and moist.   Yellow discoloration around his left eye and periorbital region   Neck: Normal range of motion. Neck supple.   Abdominal: Soft. There is no tenderness.   Musculoskeletal: Normal range of motion.   No midline spinal tenderness   Neurological: He is alert and oriented to person, place, and time.   5/5 strength of extremities. Able to stand with assistance. Ambulation limited secondary to pain         ED Course   Procedures  Labs Reviewed - No data to display       X-Rays:   Independently Interpreted Readings:   Other Readings:  X-Ray Pelvis/ Sacrum and Coccyx -  no acute fracture     Medical Decision Making:   History:   Old Medical Records: I decided to obtain old medical records.  Initial Assessment:   83 yo m, h/o COPD, lumbar compression fx, s/p mechanical fall 2 weeks ago, multiple injuries that are all resolving (head injury/extremity abrasions) but with persistent sacral/pelvic pain and difficulty ambulating 2/2 pain.  No actual fever/numbness/weakness/bowel or bladder sx.  On exam, unable to reproduce pain on palpation.  Able to stand, strength 5/5 LE but difficulty ambulating 2/2 pain    Differential Diagnosis:   Pelvic fx vs sacral/coccyx fx vs contusion  Independently Interpreted Test(s):   I have ordered and independently interpreted X-rays - see prior notes.  Clinical Tests:   Radiological Study: Ordered and Reviewed  ED Management:  -xray  -tylenol  -dispo uncertain            Scribe Attestation:   Scribe #1: I performed the above scribed service and the documentation accurately describes the services I performed. I attest to the accuracy of the note.    Attending Attestation:           Physician Attestation for Scribe:  Physician Attestation Statement for Scribe #1: I, Dr. Myers, reviewed documentation, as scribed by Aliya Duran in my presence, and it is both accurate and complete.                 ED Course     9:09 AM  xrays negative for fx, pt has walker and bas been able to ambulate in home with this device.  Long d/w pt and his relative about pain control at home, advised tylenol.  Pt comfortable returning home, will f/u with PMD this week.  May benefit from home PT/OT    Clinical Impression:   The encounter diagnosis was Back pain.    Disposition:   Disposition: Discharged  Condition: Stable       Debora Myers MD  04/21/17 1306

## 2017-04-27 ENCOUNTER — OFFICE VISIT (OUTPATIENT)
Dept: INTERNAL MEDICINE | Facility: CLINIC | Age: 82
End: 2017-04-27
Payer: MEDICARE

## 2017-04-27 ENCOUNTER — TELEPHONE (OUTPATIENT)
Dept: PAIN MEDICINE | Facility: CLINIC | Age: 82
End: 2017-04-27

## 2017-04-27 VITALS
RESPIRATION RATE: 16 BRPM | TEMPERATURE: 98 F | WEIGHT: 196.63 LBS | BODY MASS INDEX: 26.63 KG/M2 | HEIGHT: 72 IN | HEART RATE: 90 BPM | SYSTOLIC BLOOD PRESSURE: 155 MMHG | DIASTOLIC BLOOD PRESSURE: 68 MMHG

## 2017-04-27 DIAGNOSIS — R29.6 FREQUENT FALLS: ICD-10-CM

## 2017-04-27 DIAGNOSIS — I27.20 PULMONARY HYPERTENSION: ICD-10-CM

## 2017-04-27 DIAGNOSIS — T83.490S MALFUNCTION OF PENILE PROSTHESIS, SEQUELA: ICD-10-CM

## 2017-04-27 DIAGNOSIS — K44.9 GASTROESOPHAGEAL REFLUX DISEASE WITH HIATAL HERNIA: ICD-10-CM

## 2017-04-27 DIAGNOSIS — F33.1 MDD (MAJOR DEPRESSIVE DISORDER), RECURRENT EPISODE, MODERATE: ICD-10-CM

## 2017-04-27 DIAGNOSIS — M48.56XA COMPRESSION FRACTURE OF LUMBAR SPINE, NON-TRAUMATIC, INITIAL ENCOUNTER: ICD-10-CM

## 2017-04-27 DIAGNOSIS — M47.816 LUMBAR ARTHROPATHY: ICD-10-CM

## 2017-04-27 DIAGNOSIS — K21.9 GASTROESOPHAGEAL REFLUX DISEASE WITH HIATAL HERNIA: ICD-10-CM

## 2017-04-27 DIAGNOSIS — N18.30 CKD (CHRONIC KIDNEY DISEASE) STAGE 3, GFR 30-59 ML/MIN: ICD-10-CM

## 2017-04-27 DIAGNOSIS — R42 VERTIGO: ICD-10-CM

## 2017-04-27 DIAGNOSIS — D69.2 SENILE PURPURA: ICD-10-CM

## 2017-04-27 DIAGNOSIS — N40.0 BENIGN NON-NODULAR PROSTATIC HYPERPLASIA WITHOUT LOWER URINARY TRACT SYMPTOMS: ICD-10-CM

## 2017-04-27 DIAGNOSIS — I70.0 ATHEROSCLEROSIS OF AORTA: ICD-10-CM

## 2017-04-27 DIAGNOSIS — J44.9 CHRONIC OBSTRUCTIVE PULMONARY DISEASE, UNSPECIFIED COPD TYPE: Primary | ICD-10-CM

## 2017-04-27 PROCEDURE — 1160F RVW MEDS BY RX/DR IN RCRD: CPT | Mod: S$GLB,,, | Performed by: INTERNAL MEDICINE

## 2017-04-27 PROCEDURE — 1159F MED LIST DOCD IN RCRD: CPT | Mod: S$GLB,,, | Performed by: INTERNAL MEDICINE

## 2017-04-27 PROCEDURE — 99999 PR PBB SHADOW E&M-EST. PATIENT-LVL IV: CPT | Mod: PBBFAC,,, | Performed by: INTERNAL MEDICINE

## 2017-04-27 PROCEDURE — 99214 OFFICE O/P EST MOD 30 MIN: CPT | Mod: S$GLB,,, | Performed by: INTERNAL MEDICINE

## 2017-04-27 PROCEDURE — 1125F AMNT PAIN NOTED PAIN PRSNT: CPT | Mod: S$GLB,,, | Performed by: INTERNAL MEDICINE

## 2017-04-27 PROCEDURE — 99499 UNLISTED E&M SERVICE: CPT | Mod: S$GLB,,, | Performed by: INTERNAL MEDICINE

## 2017-04-27 RX ORDER — TRAMADOL HYDROCHLORIDE 50 MG/1
TABLET ORAL
Qty: 60 TABLET | Refills: 2 | Status: ON HOLD | OUTPATIENT
Start: 2017-04-27 | End: 2017-07-06

## 2017-04-27 NOTE — PROGRESS NOTES
Subjective:       Patient ID: Leonid Delacruz Jr. is a 82 y.o. male.    Chief Complaint: Follow-up and Back Pain    HPI   82 y.o. Male with COPD/Emphysema, chronic diastolic heart failure, CKD III, MDD, Aortic atherosclerosis, senile purpura, vertigo, malfunction of penile prosthesis, Lumbar arthropathy with L1 compression fracture, aortic sclerosis, BPH, GERD is here for f/u from ED on 4/19/17 for another mechanical fall. Pt has been experiencing lumbar/sacral back pain described as sharp/stabbing in nature which has worsened since the fall. Slight improvement with Tramadol.   Review of Systems   Constitutional: Negative for activity change, appetite change, chills, diaphoresis, fatigue, fever and unexpected weight change.   HENT: Negative for postnasal drip, rhinorrhea, sinus pressure, sneezing, sore throat, trouble swallowing and voice change.    Respiratory: Negative for cough, shortness of breath and wheezing.    Cardiovascular: Negative for chest pain, palpitations and leg swelling.   Gastrointestinal: Negative for abdominal pain, blood in stool, constipation, diarrhea, nausea and vomiting.   Genitourinary: Negative for dysuria.   Musculoskeletal: Positive for arthralgias and back pain. Negative for myalgias.   Skin: Negative for rash and wound.   Allergic/Immunologic: Negative for environmental allergies and food allergies.   Neurological: Positive for dizziness and weakness. Negative for tremors, seizures, syncope, facial asymmetry, speech difficulty, light-headedness, numbness and headaches.   Hematological: Negative for adenopathy. Does not bruise/bleed easily.       Objective:      Physical Exam   Constitutional: He is oriented to person, place, and time. He appears well-developed and well-nourished. No distress.   HENT:   Head: Normocephalic and atraumatic.   Eyes: Conjunctivae and EOM are normal. Pupils are equal, round, and reactive to light. Right eye exhibits no discharge. Left eye exhibits no  discharge. No scleral icterus.   Neck: Normal range of motion. Neck supple. No JVD present.   Cardiovascular: Normal rate, regular rhythm and normal heart sounds.    No murmur heard.  Pulmonary/Chest: Effort normal and breath sounds normal. No respiratory distress. He has no wheezes. He has no rales.   Abdominal: Soft. Bowel sounds are normal. There is no tenderness.   Musculoskeletal: He exhibits no edema.        Lumbar back: He exhibits tenderness, bony tenderness and pain.   Lymphadenopathy:     He has no cervical adenopathy.   Neurological: He is alert and oriented to person, place, and time.   Skin: Skin is warm and dry. No rash noted. He is not diaphoretic. No pallor.       Assessment:       1. Chronic obstructive pulmonary disease, unspecified COPD type    2. Pulmonary hypertension    3. CKD (chronic kidney disease) stage 3, GFR 30-59 ml/min    4. MDD (major depressive disorder), recurrent episode, moderate    5. Vertigo    6. Atherosclerosis of aorta    7. Aortic sclerosis    8. Senile purpura    9. Malfunction of penile prosthesis, sequela    10. Lumbar arthropathy    11. Compression fracture of lumbar spine, non-traumatic, initial encounter    12. Benign non-nodular prostatic hyperplasia without lower urinary tract symptoms    13. Gastroesophageal reflux disease with hiatal hernia    14. Frequent falls        Plan:    1. COPD- fair control on Symbicort BID   2. Chronic diastolic heart failure with mild pulm HTN- stable, no S/S of heart failure   3. CKD III- stable, no NSAIDs   4. MDD- stable on Zoloft 100 mg daily   5. BPPV- fair control on Meclizine   6. Aortic atherosclerosis with sclerosis- stable on ASA   7. Senile purpura- stable, continue to monitor    8. Malfunction of penile prosthesis- stable, followed by Urology   9. Lumbar arthropathy/ L1 compression fracture- fair control on Tramadol PRN        Referral to Pain Management   10. Aortic sclerosis- stable, continue to monitor   11. BPH- stable on  Flomax  12. GERD- stable on Prilosec  13. Frequent falls- referral to Neurology

## 2017-04-27 NOTE — TELEPHONE ENCOUNTER
----- Message from Mavis Arnold sent at 4/27/2017 10:56 AM CDT -----  Contact: SELF/ [phone]  Patient want to change his pain management doctor. Please call to discuss.    Thanks  Mavis SY

## 2017-05-01 ENCOUNTER — OFFICE VISIT (OUTPATIENT)
Dept: PAIN MEDICINE | Facility: CLINIC | Age: 82
End: 2017-05-01
Payer: MEDICARE

## 2017-05-01 VITALS
DIASTOLIC BLOOD PRESSURE: 74 MMHG | HEART RATE: 77 BPM | BODY MASS INDEX: 25.53 KG/M2 | WEIGHT: 188.25 LBS | SYSTOLIC BLOOD PRESSURE: 133 MMHG

## 2017-05-01 DIAGNOSIS — M96.1 POSTLAMINECTOMY SYNDROME OF LUMBAR REGION: ICD-10-CM

## 2017-05-01 DIAGNOSIS — M51.36 DDD (DEGENERATIVE DISC DISEASE), LUMBAR: ICD-10-CM

## 2017-05-01 DIAGNOSIS — M47.819 SPONDYLOSIS WITHOUT MYELOPATHY: ICD-10-CM

## 2017-05-01 DIAGNOSIS — M47.819 ARTHROPATHY OF FACET JOINTS AT MULTIPLE LEVELS: ICD-10-CM

## 2017-05-01 DIAGNOSIS — M47.816 FACET ARTHROPATHY, LUMBAR: Primary | ICD-10-CM

## 2017-05-01 PROCEDURE — 1125F AMNT PAIN NOTED PAIN PRSNT: CPT | Mod: S$GLB,,, | Performed by: ANESTHESIOLOGY

## 2017-05-01 PROCEDURE — 1160F RVW MEDS BY RX/DR IN RCRD: CPT | Mod: S$GLB,,, | Performed by: ANESTHESIOLOGY

## 2017-05-01 PROCEDURE — 99999 PR PBB SHADOW E&M-EST. PATIENT-LVL III: CPT | Mod: PBBFAC,,, | Performed by: ANESTHESIOLOGY

## 2017-05-01 PROCEDURE — 1159F MED LIST DOCD IN RCRD: CPT | Mod: S$GLB,,, | Performed by: ANESTHESIOLOGY

## 2017-05-01 PROCEDURE — 99499 UNLISTED E&M SERVICE: CPT | Mod: S$GLB,,, | Performed by: ANESTHESIOLOGY

## 2017-05-01 PROCEDURE — 99214 OFFICE O/P EST MOD 30 MIN: CPT | Mod: S$GLB,,, | Performed by: ANESTHESIOLOGY

## 2017-05-01 NOTE — PROGRESS NOTES
Chronic patient Established Note (Follow up visit)      SUBJECTIVE:    Leonid Delacruz Jr. presents to the clinic for a follow-up appointment for lower back pain. Patient present today with pain he has been dealing with for years. He is post-laminectomy x2 years ago. He had a recent fall on 17, went to ED x-rays showed no  Acute  fracture. He has a hx of compression fracture @ L1 that has healed. Pain is low back and coccyx, duration of pain is constant, pain is described as dull, ache, and sharp at times when he moves. He states heat helps pain, getting up out of a chair makes pain worse, he states he has no radicular symptoms, pain at its worse is 8/10.   Since the last visit, Leonid Delacruz Jr. states the pain has been stable. Current pain intensity is 8/10.    Pain Disability Index Review:  Last 3 PDI Scores 2017   Pain Disability Index (PDI) 65 42       Pain Medications:     - Opioids: Ultram (Tramadol HCL)  - Adjuvant Medications: Zoloft   - Anti-Coagulants:None  - Others: See Medication List    Opioid Contract: no     report:  Reviewed and consistent with medication use as prescribed.    Pain Procedures: Injections with Dr. Constantino including MBB and MB RFA  Facet joints injections by IR        Physical Therapy/Home Exercise: no    Imagin17 X-Ray Sacrum And Coccyx   Narrative   Sacrum and coccyx.  Degenerative change of the hips.  Postop changes noted.  No acute fracture or bone destruction evident.  Degenerative change in lumbar spine.   Impression    See above      Electronically signed by: BRITNEY CASEY MD  Date: 17  Time: 08:49     Encounter   View Encounter      Exam Details   Performed Procedure Technologist Supporting Staff Performing Physician   X-Ray Sacrum And Coccyx My Cleo Light, RT Marybel Sweet    Appointment Date/Status Modality Department      2017     Completed [facility] XRED1 485 LB LIMIT [facility] XRAY ED    Begin Exam End Exam    End Exam Questionnaires    4/19/2017  8:01 AM 4/19/2017  8:28 AM  IMAGING END ALL   Imaging Repeats    Reason Repeat Count   Position 1          Xray Lumbar Spine 3/3/16     lumbar spine with obliques compared to September 2013.  Bones are demineralized.  Moderate compression fracture involving the L1 vertebral body unchanged.  There is facet arthropathy particularly at the lower lumbar levels with a grade one antral listhesis of L5 on S1.  No convincing spondylolysis.  Calcified plaque in the aorta.    Impression compression fracture and degenerative changes as above.  ______________________________________     Electronically signed by: BRITNEY CASEY MD  Date: 03/03/16  Time: 10:39     Encounter   View Encounter         MRI Lumbar Spine 9/25/16  MRI LUMBAR SPINE    TECHNIQUE: MRI lumbar spine was performed without contrast on a 1.5T magnet. The following sequences were obtained: Localizer; sagittal T1, T2, STIR; axial T1 and T2.    COMPARISON: 1/31/2012    FINDINGS:    There are 5 lumbar vertebrae.  There is mild (grade 1) anterolisthesis of L5 on S1.  There is a chronic compression fracture of the L1 vertebral body with greater than 50% loss of vertebral body height.  There is an associated marrow edema.  Vertebral   body heights are preserved the remaining levels.  There is multilevel disk desiccation.  Disk heights are relatively well preserved. Conus terminates at L1 and appears unremarkable. Limited evaluation of posterior abdominal structures is unremarkable.    Paraspinal musculature is markedly atrophied below the level of S1 on the left.  Evaluation of sacroiliac joints is unremarkable.    L1-L2: No spinal canal stenosis or neuroforaminal narrowing.    L2-L3: Mild diffuse disk bulge without spinal canal or neuroforaminal stenosis    L3-L4: Mild bilateral facet hypertrophy without spinal canal or neuroforaminal stenosis.    L4-L5: Severe bilateral facet arthropathy, greater on the right and mild uncovering of the intervertebral disk  results in mild bilateral neural foraminal stenosis.  There is been prior bilateral laminectomy.    L5-S1: Severe bilateral facet arthropathy and an uncovering of the intervertebral disk with mild neuroforaminal narrowing.  No spinal canal stenosis.   Impression        Severe facet joint arthropathy at L4-5 and L5-S1, similar to the 1/31/12 exam. This contributes to mild bilateral neuroforaminal narrowing.  No significant central canal stenosis.   Mild anterolisthesis of L4 on L5 and L5 on S1 noted, unchanged.  ______________________________________     Electronically signed by resident: EMELIA ARAGON MD  Date: 09/25/13  Time: 17:05          As the supervising and teaching physician, I personally reviewed the images and resident's interpretation and I agree with the findings.          Electronically signed by: Barrie Limon MD  Date: 09/25/13  Time: 17:09           Allergies: Review of patient's allergies indicates:  No Known Allergies    Current Medications:   Current Outpatient Prescriptions   Medication Sig Dispense Refill    albuterol (PROAIR HFA) 90 mcg/actuation inhaler 2 HFA Aerosol Inhaler Inhalation Four times a day p;rn 3 Inhaler 3    aspirin 81 MG Chew Take 81 mg by mouth once daily.      meclizine (ANTIVERT) 25 mg tablet TAKE 1 TABLET BY MOUTH THREE TIMES DAILY AS NEEDED FOR DIZZINESS 45 tablet 0    omeprazole (PRILOSEC) 40 MG capsule TAKE 1 CAPSULE BY MOUTH TWICE DAILY BEFORE MEALS 60 capsule 5    sertraline (ZOLOFT) 100 MG tablet TAKE 1 TABLET(100 MG) BY MOUTH EVERY DAY 90 tablet 3    SYMBICORT 160-4.5 mcg/actuation HFAA INHALE 2 PUFFS INTO LUNGS EVERY 12 HOURS 10.2 g 0    SYMBICORT 160-4.5 mcg/actuation HFAA INHALE 2 PUFFS BY MOUTH TWICE DAILY 10.2 g 0    tamsulosin (FLOMAX) 0.4 mg Cp24 TAKE 1 CAPSULE BY MOUTH ONCE DAILY 90 capsule 3    tramadol (ULTRAM) 50 mg tablet Take 1-2 tabs PO q8 hrs PRN 60 tablet 2     No current facility-administered medications for this visit.        REVIEW OF  SYSTEMS:    GENERAL:  No weight loss, malaise or fevers.  HEENT:  Negative for frequent or significant headaches.  NECK:  Negative for lumps, goiter, pain and significant neck swelling.  RESPIRATORY:  Negative for cough, wheezing or shortness of breath.  CARDIOVASCULAR:  Negative for chest pain, leg swelling or palpitations.  GI:  Negative for abdominal discomfort, blood in stools or black stools or change in bowel habits.  MUSCULOSKELETAL:  See HPI.  SKIN:  Negative for lesions, rash, and itching.  PSYCH:  + for sleep disturbance, mood disorder and recent psychosocial stressors.  HEMATOLOGY/LYMPHOLOGY:  Negative for prolonged bleeding, bruising easily or swollen nodes.  NEURO:   No history of headaches, syncope, paralysis, seizures or tremors.  All other reviewed and negative other than HPI.    Past Medical History:  Past Medical History:   Diagnosis Date    Amblyopia, strabismic - Left Eye 1/7/2014    Anxiety     AR (allergic rhinitis)     Cataract     COPD (chronic obstructive pulmonary disease)     Gastroesophageal reflux disease with hiatal hernia     History of bleeding peptic ulcer 5/02/2010    History of gastritis 9/22/2009    with hemorrhage    MDD (major depressive disorder), recurrent episode, moderate 3/3/2016    Osteoarthritis     PUD (peptic ulcer disease)     stomach       Past Surgical History:  Past Surgical History:   Procedure Laterality Date    APPENDECTOMY      CATARACT EXTRACTION W/  INTRAOCULAR LENS IMPLANT  8/20/2001, 10/01/2001    COLONOSCOPY  6/06/2011    ESOPHAGOGASTRODUODENOSCOPY  5/02/2010, 8/11/2010, 3/21/2011, 6/06/2011    LAMINOTOMY  5/20/2003    Left L4-L5    PENILE PROSTHESIS IMPLANT      ROTATOR CUFF REPAIR      Bilateral    TONSILLECTOMY, ADENOIDECTOMY      VASECTOMY         Family History:  Family History   Problem Relation Age of Onset    No Known Problems Sister     No Known Problems Daughter     No Known Problems Son     Amblyopia Neg Hx     Blindness  Neg Hx     Cancer Neg Hx     Cataracts Neg Hx     Diabetes Neg Hx     Glaucoma Neg Hx     Hypertension Neg Hx     Macular degeneration Neg Hx     Retinal detachment Neg Hx     Strabismus Neg Hx     Stroke Neg Hx     Thyroid disease Neg Hx     Heart disease Neg Hx        Social History:  Social History     Social History    Marital status:      Spouse name: Ting    Number of children: 2    Years of education: N/A     Occupational History    Retired      Social History Main Topics    Smoking status: Never Smoker    Smokeless tobacco: Never Used    Alcohol use 4.2 oz/week     7 Cans of beer per week      Comment: 7-9 beers a week    Drug use: No    Sexual activity: Yes     Partners: Female     Other Topics Concern    None     Social History Narrative       OBJECTIVE:    /74  Pulse 77  Wt 85.4 kg (188 lb 4.4 oz)  BMI 25.53 kg/m2    PHYSICAL EXAMINATION:    General appearance: Well appearing, in no acute distress, alert and oriented x3.  Psych:  Mood and affect appropriate.  Skin: Skin color, texture, turgor normal, no rashes or lesions, in both upper and lower body.  Head/face:  Atraumatic, normocephalic. No palpable lymph nodes  Neck: No pain to palpation over the cervical paraspinous muscles. Spurling Negative. No pain with neck flexion, extension, or lateral flexion. .  Cor: RRR  Pulm: CTA  GI: Abdomen soft and non-tender.  Back: Straight leg raising in the sitting and supine positions is negative to radicular pain. + pain to palpation over the L- spine. Normal range of motion with pain reproduction. + Facet Loading Bilaterally. +PSIS over L- spine and coccyx   Extremities: Peripheral joint ROM is full and pain free without obvious instability or laxity in all four extremities. No deformities, edema, or skin discoloration. Good capillary refill.  Musculoskeletal: Shoulder, hip, sacroiliac and knee provocative maneuvers are negative. Bilateral upper and lower extremity strength is  normal and symmetric.  No atrophy or tone abnormalities are noted.  Neuro: Bilateral upper and lower extremity coordination and muscle stretch reflexes are physiologic and symmetric.  Plantar response are downgoing. No loss of sensation is noted.  Gait: Normal.    ASSESSMENT: 82 y.o. year old male with low back  pain, consistent with     Diagnosis:    1. Facet arthropathy, lumbar  MRI Lumbar Spine Without Contrast   2. Arthropathy of facet joints at multiple levels  MRI Lumbar Spine Without Contrast   3. Spondylosis without myelopathy  MRI Lumbar Spine Without Contrast   4. DDD (degenerative disc disease), lumbar  MRI Lumbar Spine Without Contrast   5. Postlaminectomy syndrome of lumbar region  MRI Lumbar Spine Without Contrast         PLAN:     - I have stressed the importance of physical activity and a home exercise plan to help with pain and improve health.  - Order MRI of the Lumbar Spine to help evaluate possible source of pain.  - Patient can continue with medications for now since they are providing benefits, using them appropriately, and without side effects.   - RTC in 2-3 weeks for review of MRI  - Counseled patient regarding the importance of activity modification, constant sleeping habits and physical therapy.    Jamilah Haas MD\  5/1/17

## 2017-05-10 ENCOUNTER — HOSPITAL ENCOUNTER (OUTPATIENT)
Dept: RADIOLOGY | Facility: HOSPITAL | Age: 82
Discharge: HOME | End: 2017-05-10
Attending: NURSE PRACTITIONER
Payer: MEDICARE

## 2017-05-10 ENCOUNTER — TELEPHONE (OUTPATIENT)
Dept: PAIN MEDICINE | Facility: CLINIC | Age: 82
End: 2017-05-10

## 2017-05-10 DIAGNOSIS — M51.36 DDD (DEGENERATIVE DISC DISEASE), LUMBAR: ICD-10-CM

## 2017-05-10 DIAGNOSIS — M47.819 SPONDYLOSIS WITHOUT MYELOPATHY: ICD-10-CM

## 2017-05-10 DIAGNOSIS — M47.816 FACET ARTHROPATHY, LUMBAR: ICD-10-CM

## 2017-05-10 DIAGNOSIS — M96.1 POSTLAMINECTOMY SYNDROME OF LUMBAR REGION: ICD-10-CM

## 2017-05-10 DIAGNOSIS — M47.819 ARTHROPATHY OF FACET JOINTS AT MULTIPLE LEVELS: ICD-10-CM

## 2017-05-10 PROCEDURE — 72148 MRI LUMBAR SPINE W/O DYE: CPT | Mod: TC

## 2017-05-10 PROCEDURE — 72148 MRI LUMBAR SPINE W/O DYE: CPT | Mod: 26,,, | Performed by: RADIOLOGY

## 2017-05-10 NOTE — TELEPHONE ENCOUNTER
Called the patient and updated him with MRI. He seems to have insufficiency sacral fracture. We will discuss results at the pain clinic tomorrow and discuss treatment options

## 2017-05-11 ENCOUNTER — OFFICE VISIT (OUTPATIENT)
Dept: PAIN MEDICINE | Facility: CLINIC | Age: 82
End: 2017-05-11
Payer: MEDICARE

## 2017-05-11 VITALS
DIASTOLIC BLOOD PRESSURE: 82 MMHG | HEART RATE: 70 BPM | BODY MASS INDEX: 25.5 KG/M2 | WEIGHT: 188 LBS | SYSTOLIC BLOOD PRESSURE: 124 MMHG

## 2017-05-11 DIAGNOSIS — M84.48XA SACRAL INSUFFICIENCY FRACTURE, INITIAL ENCOUNTER: Primary | ICD-10-CM

## 2017-05-11 PROCEDURE — 99213 OFFICE O/P EST LOW 20 MIN: CPT | Mod: S$GLB,,, | Performed by: ANESTHESIOLOGY

## 2017-05-11 PROCEDURE — 99499 UNLISTED E&M SERVICE: CPT | Mod: S$GLB,,, | Performed by: ANESTHESIOLOGY

## 2017-05-11 PROCEDURE — 1159F MED LIST DOCD IN RCRD: CPT | Mod: S$GLB,,, | Performed by: ANESTHESIOLOGY

## 2017-05-11 PROCEDURE — 1125F AMNT PAIN NOTED PAIN PRSNT: CPT | Mod: S$GLB,,, | Performed by: ANESTHESIOLOGY

## 2017-05-11 PROCEDURE — 99999 PR PBB SHADOW E&M-EST. PATIENT-LVL III: CPT | Mod: PBBFAC,,, | Performed by: ANESTHESIOLOGY

## 2017-05-11 PROCEDURE — 1160F RVW MEDS BY RX/DR IN RCRD: CPT | Mod: S$GLB,,, | Performed by: ANESTHESIOLOGY

## 2017-05-11 NOTE — PROGRESS NOTES
Chronic patient Established Note (Follow up visit)      SUBJECTIVE:    Leonid Delacruz Jr. presents to the clinic for a follow-up appointment for lower back pain and to review Lumbar spine MRI done 5/10/17. Since the last visit, Leonid Delacruz Jr. states the pain has been persistant. Current pain intensity is 6/10.  MRI showed Acute fracture of the S2 vertebral body and left sacral ala, likely insufficiency.    Pain Disability Index Review:  Last 3 PDI Scores 5/11/2017 5/1/2017 5/4/2016   Pain Disability Index (PDI) 60 65 42       Pain Medications:  - Opioids: Ultram (Tramadol HCL)  - Adjuvant Medications: Zoloft   - Anti-Coagulants:None  - Others: See Medication List    Opioid Contract: no     report:  Reviewed and consistent with medication use as prescribed.    Pain Procedures: Injections with Dr. Constantino including MBB and MB RFA  Facet joints injections by IR     Physical Therapy/Home Exercise: no    Imaging: MRI Lumbar Spine Without Contrast 5/10/17  Narrative   MRI LUMBAR SPINE    TECHNIQUE: MRI lumbar spine was performed without contrast. The following sequences were obtained: Localizer; sagittal T1, T2 CUBE, STIR; axial T1 and T2.    COMPARISON: 09/25/13    FINDINGS:    There are 5 lumbar vertebrae.  Postoperative changes noted at L4-L5 and L5-S1 consistent with prior decompression.  There is compression fracture of L1 with severe height loss, unchanged.  Remaining vertebral body heights are maintained.  There is grade 1 anterolisthesis of L4-L5 and L5-S1.  There is a nondisplaced fracture involving the S2 sacral segment and left sacral ala, demonstrating mild angulation.  No evidence for marrow infiltrative process.  Disc heights are maintained, noting multilevel disc desiccation. Conus terminates at T12-L1 and appears unremarkable. Note made of renal cysts.  Penile prosthesis reservoir noted anterior to the urinary bladder.  Paraspinal musculature is moderately atrophied.  Evaluation of sacroiliac  joints is unremarkable.    T12-L1: Mild retropulsion of L1 vertebral body effaces the ventral thecal sac. No spinal canal stenosis or neuroforaminal narrowing.    L1-L2: No spinal canal stenosis or neuroforaminal narrowing.    L2-L3: Mild bilateral facet arthropathy noted.  No spinal canal stenosis or neuroforaminal narrowing.    L3-L4: Mild bilateral facet arthropathy noted.  No spinal canal stenosis or neuroforaminal narrowing.    L4-L5: Status post left laminectomy.  Circumferential disc bulge and moderate to severe bilateral facet arthropathy result in effacement of the dorsal thecal sac and mild right, moderate left neuroforaminal narrowing.    L5-S1: Status post bilateral laminectomy.  Uncovering of the intervertebral disc along with disc bulge and severe bilateral facet arthropathy noted.  No spinal canal stenosis or neuroforaminal narrowing.   Impression      1.  Acute fracture of the S2 vertebral body and left sacral ala, likely insufficiency.  Recommend followup MRI pelvis in 3 months to ensure improvement in marrow signal and absence of underlying neoplasm.  2.  Degenerative and postoperative changes of lumbar spine as detailed above.      Electronically signed by: BRIDGETTE SUAREZ MD  Date: 05/10/17  Time: 08:57      4/19/17 X-Ray Sacrum And Coccyx   Narrative   Sacrum and coccyx.  Degenerative change of the hips.  Postop changes noted.  No acute fracture or bone destruction evident.  Degenerative change in lumbar spine.   Impression    See above         Xray Lumbar Spine 3/3/16      lumbar spine with obliques compared to September 2013.  Bones are demineralized.  Moderate compression fracture involving the L1 vertebral body unchanged.  There is facet arthropathy particularly at the lower lumbar levels with a grade one antral listhesis of L5 on S1.  No convincing spondylolysis.  Calcified plaque in the aorta.    Impression compression fracture and degenerative changes as  above.  ______________________________________     Electronically signed by: BRITNEY CASEY MD  Date: 03/03/16  Time: 10:39     Encounter   View Encounter          MRI Lumbar Spine 9/25/16  MRI LUMBAR SPINE    TECHNIQUE: MRI lumbar spine was performed without contrast on a 1.5T magnet. The following sequences were obtained: Localizer; sagittal T1, T2, STIR; axial T1 and T2.    COMPARISON: 1/31/2012    FINDINGS:    There are 5 lumbar vertebrae.  There is mild (grade 1) anterolisthesis of L5 on S1.  There is a chronic compression fracture of the L1 vertebral body with greater than 50% loss of vertebral body height.  There is an associated marrow edema.  Vertebral   body heights are preserved the remaining levels.  There is multilevel disk desiccation.  Disk heights are relatively well preserved. Conus terminates at L1 and appears unremarkable. Limited evaluation of posterior abdominal structures is unremarkable.    Paraspinal musculature is markedly atrophied below the level of S1 on the left.  Evaluation of sacroiliac joints is unremarkable.    L1-L2: No spinal canal stenosis or neuroforaminal narrowing.    L2-L3: Mild diffuse disk bulge without spinal canal or neuroforaminal stenosis    L3-L4: Mild bilateral facet hypertrophy without spinal canal or neuroforaminal stenosis.    L4-L5: Severe bilateral facet arthropathy, greater on the right and mild uncovering of the intervertebral disk results in mild bilateral neural foraminal stenosis.  There is been prior bilateral laminectomy.    L5-S1: Severe bilateral facet arthropathy and an uncovering of the intervertebral disk with mild neuroforaminal narrowing.  No spinal canal stenosis.   Impression        Severe facet joint arthropathy at L4-5 and L5-S1, similar to the 1/31/12 exam. This contributes to mild bilateral neuroforaminal narrowing.  No significant central canal stenosis.   Mild anterolisthesis of L4 on L5 and L5 on S1 noted,  unchanged.  ______________________________________     Electronically signed by resident: EMELIA ARAGON MD  Date: 09/25/13  Time: 17:05          As the supervising and teaching physician, I personally reviewed the images and resident's interpretation and I agree with the findings.          Electronically signed by: Barrie Limon MD  Date: 09/25/13  Time: 17:09          Allergies: Review of patient's allergies indicates:  No Known Allergies    Current Medications:   Current Outpatient Prescriptions   Medication Sig Dispense Refill    albuterol (PROAIR HFA) 90 mcg/actuation inhaler 2 HFA Aerosol Inhaler Inhalation Four times a day p;rn 3 Inhaler 3    aspirin 81 MG Chew Take 81 mg by mouth once daily.      meclizine (ANTIVERT) 25 mg tablet TAKE 1 TABLET BY MOUTH THREE TIMES DAILY AS NEEDED FOR DIZZINESS 45 tablet 0    omeprazole (PRILOSEC) 40 MG capsule TAKE 1 CAPSULE BY MOUTH TWICE DAILY BEFORE MEALS 60 capsule 5    sertraline (ZOLOFT) 100 MG tablet TAKE 1 TABLET(100 MG) BY MOUTH EVERY DAY 90 tablet 3    SYMBICORT 160-4.5 mcg/actuation HFAA INHALE 2 PUFFS INTO LUNGS EVERY 12 HOURS 10.2 g 0    SYMBICORT 160-4.5 mcg/actuation HFAA INHALE 2 PUFFS BY MOUTH TWICE DAILY 10.2 g 0    tamsulosin (FLOMAX) 0.4 mg Cp24 TAKE 1 CAPSULE BY MOUTH ONCE DAILY 90 capsule 3    tramadol (ULTRAM) 50 mg tablet Take 1-2 tabs PO q8 hrs PRN 60 tablet 2     No current facility-administered medications for this visit.        REVIEW OF SYSTEMS:    GENERAL: No weight loss, malaise or fevers.  HEENT: Negative for frequent or significant headaches.  NECK: Negative for lumps, goiter, pain and significant neck swelling.  RESPIRATORY: Negative for cough, wheezing or shortness of breath.  CARDIOVASCULAR: Negative for chest pain, leg swelling or palpitations.  GI: Negative for abdominal discomfort, blood in stools or black stools or change in bowel habits.  MUSCULOSKELETAL: See HPI.  SKIN: Negative for lesions, rash, and itching.  PSYCH: + for  sleep disturbance, mood disorder and recent psychosocial stressors.  HEMATOLOGY/LYMPHOLOGY: Negative for prolonged bleeding, bruising easily or swollen nodes.  NEURO: No history of headaches, syncope, paralysis, seizures or tremors.  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    Anxiety     AR (allergic rhinitis)     Cataract     COPD (chronic obstructive pulmonary disease)     Gastroesophageal reflux disease with hiatal hernia     History of bleeding peptic ulcer 5/02/2010    History of gastritis 9/22/2009    with hemorrhage    MDD (major depressive disorder), recurrent episode, moderate 3/3/2016    Osteoarthritis     PUD (peptic ulcer disease)     stomach       Past Surgical History:  Past Surgical History:   Procedure Laterality Date    APPENDECTOMY      CATARACT EXTRACTION W/  INTRAOCULAR LENS IMPLANT  8/20/2001, 10/01/2001    COLONOSCOPY  6/06/2011    ESOPHAGOGASTRODUODENOSCOPY  5/02/2010, 8/11/2010, 3/21/2011, 6/06/2011    LAMINOTOMY  5/20/2003    Left L4-L5    PENILE PROSTHESIS IMPLANT      ROTATOR CUFF REPAIR      Bilateral    TONSILLECTOMY, ADENOIDECTOMY      VASECTOMY         Family History:  Family History   Problem Relation Age of Onset    No Known Problems Sister     No Known Problems Daughter     No Known Problems Son     Amblyopia Neg Hx     Blindness Neg Hx     Cancer Neg Hx     Cataracts Neg Hx     Diabetes Neg Hx     Glaucoma Neg Hx     Hypertension Neg Hx     Macular degeneration Neg Hx     Retinal detachment Neg Hx     Strabismus Neg Hx     Stroke Neg Hx     Thyroid disease Neg Hx     Heart disease Neg Hx        Social History:  Social History     Social History    Marital status:      Spouse name: Ting    Number of children: 2    Years of education: N/A     Occupational History    Retired      Social History Main Topics    Smoking status:  Never Smoker    Smokeless tobacco: Never Used    Alcohol use 4.2 oz/week     7 Cans of beer per week      Comment: 7-9 beers a week    Drug use: No    Sexual activity: Yes     Partners: Female     Other Topics Concern    None     Social History Narrative       OBJECTIVE:    /82  Pulse 70  Wt 85.3 kg (188 lb)  BMI 25.5 kg/m2    PHYSICAL EXAMINATION:    General appearance: Well appearing, in no acute distress, alert and oriented x3.  Psych: Mood and affect appropriate.  Skin: Skin color, texture, turgor normal, no rashes or lesions, in both upper and lower body.  Head/face: Atraumatic, normocephalic. No palpable lymph nodes  Neck: No pain to palpation over the cervical paraspinous muscles. Spurling Negative. No pain with neck flexion, extension, or lateral flexion. .  Back: Straight leg raising in the sitting and supine positions is negative to radicular pain. + pain to palpation over the L- spine and over the sacral area . Normal range of motion with pain reproduction. + Facet Loading Bilaterally.   Extremities: Peripheral joint ROM is full and pain free without obvious instability or laxity in all four extremities. No deformities, edema, or skin discoloration. Good capillary refill.  Musculoskeletal: Shoulder, hip, sacroiliac and knee provocative maneuvers are negative. Bilateral upper and lower extremity strength is normal and symmetric. No atrophy or tone abnormalities are noted.  Neuro: Bilateral upper and lower extremity coordination and muscle stretch reflexes are physiologic and symmetric 2+ in  UEs 1+ in LEs. Plantar response are downgoing. No loss of sensation is noted.  Gait: Normal.    ASSESSMENT: 82 y.o. year old male with low back and sacral  pain, consistent with sacral insufficiency fracture at S2. The pain started after a fall 5 weeks ago.  He has  Hx of old L1 compression fracture .  MRI of the L spine showed  Acute fracture of the S2 vertebral body and left sacral ala, likely  insufficiency.     1. Sacral insufficiency fracture, initial encounter          PLAN:     -Will refer to neurosurgery  for sacroplasty to help with his sacral pain associated with the sacral fracture  - RTC as needed  - Counseled patient regarding the importance of constant sleeping habits.    The above plan and management options were discussed at length with patient. Patient is in agreement with the above and verbalized understanding.    Jamilah Haas  05/11/2017

## 2017-05-17 RX ORDER — SERTRALINE HYDROCHLORIDE 100 MG/1
TABLET, FILM COATED ORAL
Qty: 90 TABLET | Refills: 3 | Status: SHIPPED | OUTPATIENT
Start: 2017-05-17 | End: 2018-05-30 | Stop reason: SDUPTHER

## 2017-05-24 ENCOUNTER — TELEPHONE (OUTPATIENT)
Dept: NEUROSURGERY | Facility: CLINIC | Age: 82
End: 2017-05-24

## 2017-06-05 ENCOUNTER — INITIAL CONSULT (OUTPATIENT)
Dept: NEUROSURGERY | Facility: CLINIC | Age: 82
End: 2017-06-05
Payer: MEDICARE

## 2017-06-05 ENCOUNTER — OFFICE VISIT (OUTPATIENT)
Dept: INTERNAL MEDICINE | Facility: CLINIC | Age: 82
End: 2017-06-05
Payer: MEDICARE

## 2017-06-05 ENCOUNTER — LAB VISIT (OUTPATIENT)
Dept: LAB | Facility: HOSPITAL | Age: 82
End: 2017-06-05
Attending: INTERNAL MEDICINE
Payer: MEDICARE

## 2017-06-05 VITALS
DIASTOLIC BLOOD PRESSURE: 82 MMHG | HEIGHT: 72 IN | BODY MASS INDEX: 27.11 KG/M2 | WEIGHT: 200.19 LBS | HEART RATE: 90 BPM | SYSTOLIC BLOOD PRESSURE: 146 MMHG | TEMPERATURE: 98 F | RESPIRATION RATE: 16 BRPM

## 2017-06-05 VITALS
DIASTOLIC BLOOD PRESSURE: 80 MMHG | SYSTOLIC BLOOD PRESSURE: 147 MMHG | HEIGHT: 72 IN | BODY MASS INDEX: 27.09 KG/M2 | WEIGHT: 200 LBS | HEART RATE: 99 BPM

## 2017-06-05 DIAGNOSIS — K21.9 GASTROESOPHAGEAL REFLUX DISEASE WITH HIATAL HERNIA: ICD-10-CM

## 2017-06-05 DIAGNOSIS — N18.30 CKD (CHRONIC KIDNEY DISEASE) STAGE 3, GFR 30-59 ML/MIN: ICD-10-CM

## 2017-06-05 DIAGNOSIS — R42 VERTIGO: ICD-10-CM

## 2017-06-05 DIAGNOSIS — I50.32 CHRONIC DIASTOLIC HEART FAILURE: ICD-10-CM

## 2017-06-05 DIAGNOSIS — N40.0 BENIGN NON-NODULAR PROSTATIC HYPERPLASIA WITHOUT LOWER URINARY TRACT SYMPTOMS: ICD-10-CM

## 2017-06-05 DIAGNOSIS — M81.0 OSTEOPOROSIS, UNSPECIFIED OSTEOPOROSIS TYPE, UNSPECIFIED PATHOLOGICAL FRACTURE PRESENCE: ICD-10-CM

## 2017-06-05 DIAGNOSIS — K44.9 GASTROESOPHAGEAL REFLUX DISEASE WITH HIATAL HERNIA: ICD-10-CM

## 2017-06-05 DIAGNOSIS — J44.9 CHRONIC OBSTRUCTIVE PULMONARY DISEASE, UNSPECIFIED COPD TYPE: Primary | ICD-10-CM

## 2017-06-05 DIAGNOSIS — M48.56XA COMPRESSION FRACTURE OF LUMBAR SPINE, NON-TRAUMATIC, INITIAL ENCOUNTER: ICD-10-CM

## 2017-06-05 DIAGNOSIS — S32.10XA CLOSED FRACTURE OF SACRUM, UNSPECIFIED PORTION OF SACRUM, INITIAL ENCOUNTER: ICD-10-CM

## 2017-06-05 DIAGNOSIS — I27.20 PULMONARY HYPERTENSION: ICD-10-CM

## 2017-06-05 DIAGNOSIS — M47.816 LUMBAR ARTHROPATHY: ICD-10-CM

## 2017-06-05 DIAGNOSIS — Z01.818 PRE-OP EVALUATION: ICD-10-CM

## 2017-06-05 DIAGNOSIS — S32.10XA CLOSED FRACTURE OF SACRUM, UNSPECIFIED PORTION OF SACRUM, INITIAL ENCOUNTER: Primary | ICD-10-CM

## 2017-06-05 DIAGNOSIS — T83.490S MALFUNCTION OF PENILE PROSTHESIS, SEQUELA: ICD-10-CM

## 2017-06-05 DIAGNOSIS — M48.56XD COMPRESSION FRACTURE OF LUMBAR SPINE, NON-TRAUMATIC, WITH ROUTINE HEALING, SUBSEQUENT ENCOUNTER: ICD-10-CM

## 2017-06-05 DIAGNOSIS — I70.0 ATHEROSCLEROSIS OF AORTA: ICD-10-CM

## 2017-06-05 DIAGNOSIS — D69.2 SENILE PURPURA: ICD-10-CM

## 2017-06-05 DIAGNOSIS — F33.1 MDD (MAJOR DEPRESSIVE DISORDER), RECURRENT EPISODE, MODERATE: ICD-10-CM

## 2017-06-05 LAB
ANION GAP SERPL CALC-SCNC: 13 MMOL/L
BASOPHILS # BLD AUTO: 0.06 K/UL
BASOPHILS NFR BLD: 0.9 %
BUN SERPL-MCNC: 20 MG/DL
CALCIUM SERPL-MCNC: 8.7 MG/DL
CHLORIDE SERPL-SCNC: 102 MMOL/L
CO2 SERPL-SCNC: 23 MMOL/L
CREAT SERPL-MCNC: 1.4 MG/DL
DIFFERENTIAL METHOD: ABNORMAL
EOSINOPHIL # BLD AUTO: 0.2 K/UL
EOSINOPHIL NFR BLD: 2.3 %
ERYTHROCYTE [DISTWIDTH] IN BLOOD BY AUTOMATED COUNT: 19 %
EST. GFR  (AFRICAN AMERICAN): 53.7 ML/MIN/1.73 M^2
EST. GFR  (NON AFRICAN AMERICAN): 46.5 ML/MIN/1.73 M^2
GLUCOSE SERPL-MCNC: 82 MG/DL
HCT VFR BLD AUTO: 37.2 %
HGB BLD-MCNC: 12 G/DL
LYMPHOCYTES # BLD AUTO: 2.7 K/UL
LYMPHOCYTES NFR BLD: 39.2 %
MCH RBC QN AUTO: 28.4 PG
MCHC RBC AUTO-ENTMCNC: 32.3 %
MCV RBC AUTO: 88 FL
MONOCYTES # BLD AUTO: 1 K/UL
MONOCYTES NFR BLD: 14.2 %
NEUTROPHILS # BLD AUTO: 3 K/UL
NEUTROPHILS NFR BLD: 43.1 %
PLATELET # BLD AUTO: 327 K/UL
PMV BLD AUTO: 9.5 FL
POTASSIUM SERPL-SCNC: 4.2 MMOL/L
RBC # BLD AUTO: 4.23 M/UL
SODIUM SERPL-SCNC: 138 MMOL/L
WBC # BLD AUTO: 6.91 K/UL

## 2017-06-05 PROCEDURE — 1159F MED LIST DOCD IN RCRD: CPT | Mod: S$GLB,,, | Performed by: PHYSICIAN ASSISTANT

## 2017-06-05 PROCEDURE — 1125F AMNT PAIN NOTED PAIN PRSNT: CPT | Mod: S$GLB,,, | Performed by: PHYSICIAN ASSISTANT

## 2017-06-05 PROCEDURE — 99499 UNLISTED E&M SERVICE: CPT | Mod: S$GLB,,, | Performed by: INTERNAL MEDICINE

## 2017-06-05 PROCEDURE — 1126F AMNT PAIN NOTED NONE PRSNT: CPT | Mod: S$GLB,,, | Performed by: INTERNAL MEDICINE

## 2017-06-05 PROCEDURE — 99204 OFFICE O/P NEW MOD 45 MIN: CPT | Mod: S$GLB,,, | Performed by: PHYSICIAN ASSISTANT

## 2017-06-05 PROCEDURE — 99214 OFFICE O/P EST MOD 30 MIN: CPT | Mod: S$GLB,,, | Performed by: INTERNAL MEDICINE

## 2017-06-05 PROCEDURE — 99499 UNLISTED E&M SERVICE: CPT | Mod: S$GLB,,, | Performed by: PHYSICIAN ASSISTANT

## 2017-06-05 PROCEDURE — 1159F MED LIST DOCD IN RCRD: CPT | Mod: S$GLB,,, | Performed by: INTERNAL MEDICINE

## 2017-06-05 PROCEDURE — 99999 PR PBB SHADOW E&M-EST. PATIENT-LVL IV: CPT | Mod: PBBFAC,,, | Performed by: PHYSICIAN ASSISTANT

## 2017-06-05 PROCEDURE — 99999 PR PBB SHADOW E&M-EST. PATIENT-LVL III: CPT | Mod: PBBFAC,,, | Performed by: INTERNAL MEDICINE

## 2017-06-05 RX ORDER — CALCIUM CARBONATE 500(1250)
1 TABLET ORAL 2 TIMES DAILY
Refills: 0 | COMMUNITY
Start: 2017-06-05 | End: 2019-02-21

## 2017-06-05 RX ORDER — ALENDRONATE SODIUM 10 MG/1
10 TABLET ORAL DAILY
Qty: 30 TABLET | Refills: 11 | Status: SHIPPED | OUTPATIENT
Start: 2017-06-05 | End: 2017-06-14

## 2017-06-05 RX ORDER — CALCIUM CARB/VITAMIN D3/VIT K1 500-500-40
400 TABLET,CHEWABLE ORAL 2 TIMES DAILY
Refills: 0 | COMMUNITY
Start: 2017-06-05

## 2017-06-05 NOTE — PROGRESS NOTES
Subjective:       Patient ID: Leonid Delacruz Jr. is a 82 y.o. male.    Chief Complaint: Follow-up    HPI   82 y.o. Male with COPD/Emphysema, chronic diastolic heart failure, CKD III, MDD, Aortic atherosclerosis, senile purpura, vertigo, malfunction of penile prosthesis, Lumbar arthropathy with L1 compression fracture, aortic sclerosis, BPH, GERD is here for 4 month f/u. Pt recently had an MRI done of his Lumbar spine for back pain which showed a sacral fracture for which he will f/u with Neurosurgery today. He has continued to have persistent low back pain.   Review of Systems   Constitutional: Negative for activity change, appetite change, chills, diaphoresis, fatigue, fever and unexpected weight change.   HENT: Negative for postnasal drip, rhinorrhea, sinus pressure, sneezing, sore throat, trouble swallowing and voice change.    Respiratory: Negative for cough, shortness of breath and wheezing.    Cardiovascular: Negative for chest pain, palpitations and leg swelling.   Gastrointestinal: Negative for abdominal pain, blood in stool, constipation, diarrhea, nausea and vomiting.   Genitourinary: Negative for dysuria.   Musculoskeletal: Positive for arthralgias and back pain. Negative for myalgias.   Skin: Negative for rash and wound.   Allergic/Immunologic: Negative for environmental allergies and food allergies.   Hematological: Negative for adenopathy. Does not bruise/bleed easily.       Objective:      Physical Exam   Constitutional: He is oriented to person, place, and time. He appears well-developed and well-nourished. No distress.   HENT:   Head: Normocephalic and atraumatic.   Eyes: Conjunctivae and EOM are normal. Pupils are equal, round, and reactive to light. Right eye exhibits no discharge. Left eye exhibits no discharge. No scleral icterus.   Neck: Normal range of motion. Neck supple. No JVD present.   Cardiovascular: Normal rate, regular rhythm and normal heart sounds.    No murmur  heard.  Pulmonary/Chest: Effort normal and breath sounds normal. No respiratory distress. He has no wheezes. He has no rales.   Abdominal: Soft. Bowel sounds are normal. There is no tenderness.   Musculoskeletal: He exhibits no edema.        Lumbar back: He exhibits tenderness and pain.   Lymphadenopathy:     He has no cervical adenopathy.   Neurological: He is alert and oriented to person, place, and time.   Skin: Skin is warm and dry. No rash noted. He is not diaphoretic. No pallor.       Assessment:       1. Chronic obstructive pulmonary disease, unspecified COPD type    2. Chronic diastolic heart failure    3. Pulmonary hypertension    4. CKD (chronic kidney disease) stage 3, GFR 30-59 ml/min    5. MDD (major depressive disorder), recurrent episode, moderate    6. Vertigo    7. Atherosclerosis of aorta    8. Aortic sclerosis    9. Senile purpura    10. Malfunction of penile prosthesis, sequela    11. Lumbar arthropathy    12. Compression fracture of lumbar spine, non-traumatic, initial encounter    13. Closed fracture of sacrum, unspecified portion of sacrum, initial encounter    14. Gastroesophageal reflux disease with hiatal hernia    15. Benign non-nodular prostatic hyperplasia without lower urinary tract symptoms        Plan:    1. COPD- fair control on Symbicort BID   2. Chronic diastolic heart failure with mild pulm HTN- stable, no S/S of heart failure   3. CKD III- stable, no NSAIDs   4. MDD- stable on Zoloft 100 mg daily   5. BPPV- fair control on Meclizine   6. Aortic atherosclerosis- stable on ASA   7. Senile purpura- stable, continue to monitor    8. Malfunction of penile prosthesis- stable, followed by Urology   9. Lumbar arthropathy/ L1 compression fracture/Sacral fracture- fair control on Tramadol PRN        Followed by Pain Management. Pt will see Neurosurgery today   10. Aortic sclerosis- stable, continue to monitor   11. BPH- stable on Flomax  12. GERD- stable on Prilosec  13. Frequent falls-  none recently, pt did not f/u with Neurology   14. F/u in 4 months

## 2017-06-05 NOTE — LETTER
June 5, 2017      Jamilah Haas MD  [address]  [address]  [facility] [address]           [place] - Neurosurgery  [address], [address]  [facility] [address]  Phone: [phone]          Patient: Leonid Delacruz Jr.   MR Number: [id number]   YOB: 1934   Date of Visit: 6/5/2017       Dear Dr. Jamilah Haas:    Thank you for referring Leonid Delacruz to me for evaluation. Attached you will find relevant portions of my assessment and plan of care.    If you have questions, please do not hesitate to call me. I look forward to following Leonid Delacruz along with you.    Sincerely,    Kandy Black PA-C    Enclosure  CC:  No Recipients    If you would like to receive this communication electronically, please contact [email or web address] or [phone] to request more information on [facility] access.    For providers and/or their staff who would like to refer a patient to [facility], please contact us through our one-stop-shop provider referral line, [facility], at [phone].    If you feel you have received this communication in error or would no longer like to receive these types of communications, please e-mail [email or web address]

## 2017-06-05 NOTE — PROGRESS NOTES
[place] - Neurosurgery  Clinic Consult     Consult Requested By: Dr. Haas     Reason for Consult: sacral insufficiency fracture       SUBJECTIVE:     Chief Complaint: low back pain     History of Present Illness:  Leonid Delacruz Jr. is a 82 y.o. male who presents with complaints of low back pain. Patient had a fall on April 6, 2017. He states he was evaluated in the ED 10 days later. He states since the fall, he has been experienced constant, severe low back pain. The pain is located in the midline of the sacrum. It radiates down to the tailbone and up to just above his belt line. The pain does not radiate to the side of his sacrum. He denies pain in his legs. The pain is constant. It is worse with standing and worsens with standing for prolonged periods. It is better with sitting. It will sometimes keep him from sleeping. He has been taking tramadol 50 mg TID prn and tylenol without relief. He has a history of low back pain and states he received injections in the past with pain management. He denies bowel/bladder dysfunction.       Low Back Pain Scale  R Low Back-Pain Score: 8  R Low Back-Pain Intensity: Pain killers give very little relief from pain  R Low Back-Pain Score: It is painful to look after myself and I am slow and careful  Low Back-Lifting: I can only lift very light weights   Low Back-Walking: I can only walk using a cane or crutches   Low Back-Sitting: Pain prevents me from sitting more than 30 minutes   Low Back-Standing: I cannot stand for longer than 10 minutes with increasing pain   Low Back-Sleeping: Because of pain my normal nights sleep is reduced by less than three quarters   Low Back-Social Life: Pain has restricted my social life and I do not go out very often   Low Back-Traveling: Pain restricts all forms of travel   Low Back-Changing Degree of Pain: My pain seems to be getting better but improvement is slow       Review of patient's allergies indicates:  No Known Allergies    Past  Medical History:   Diagnosis Date    Amblyopia, strabismic - Left Eye 1/7/2014    Anxiety     AR (allergic rhinitis)     Cataract     COPD (chronic obstructive pulmonary disease)     Gastroesophageal reflux disease with hiatal hernia     History of bleeding peptic ulcer 5/02/2010    History of gastritis 9/22/2009    with hemorrhage    MDD (major depressive disorder), recurrent episode, moderate 3/3/2016    Osteoarthritis     PUD (peptic ulcer disease)     stomach     Past Surgical History:   Procedure Laterality Date    APPENDECTOMY      CATARACT EXTRACTION W/  INTRAOCULAR LENS IMPLANT  8/20/2001, 10/01/2001    COLONOSCOPY  6/06/2011    ESOPHAGOGASTRODUODENOSCOPY  5/02/2010, 8/11/2010, 3/21/2011, 6/06/2011    LAMINOTOMY  5/20/2003    Left L4-L5    PENILE PROSTHESIS IMPLANT      ROTATOR CUFF REPAIR      Bilateral    TONSILLECTOMY, ADENOIDECTOMY      VASECTOMY       Family History   Problem Relation Age of Onset    No Known Problems Sister     No Known Problems Daughter     No Known Problems Son     Amblyopia Neg Hx     Blindness Neg Hx     Cancer Neg Hx     Cataracts Neg Hx     Diabetes Neg Hx     Glaucoma Neg Hx     Hypertension Neg Hx     Macular degeneration Neg Hx     Retinal detachment Neg Hx     Strabismus Neg Hx     Stroke Neg Hx     Thyroid disease Neg Hx     Heart disease Neg Hx      Social History   Substance Use Topics    Smoking status: Never Smoker    Smokeless tobacco: Never Used    Alcohol use 4.2 oz/week     7 Cans of beer per week      Comment: 7-9 beers a week        Review of Systems:  Constitutional: no fever, chills or night sweats. No changes in weight   Eyes: no visual changes   ENT: no nasal congestion or sore throat   Respiratory: no cough or shortness of breath   Cardiovascular: no chest pain or palpitations   Gastrointestinal: no nausea or vomiting   Genitourinary: no hematuria or dysuria   Integument/Breast: no rash or pruritis    Hematologic/Lymphatic: no easy bruising or lymphadenopathy   Musculoskeletal: positive for low back pain   Neurological: no seizures or tremors     OBJECTIVE:     Vital Signs (Most Recent):  Pulse: 99 (06/05/17 1413)  BP: (!) 147/80 (06/05/17 1413)    Physical Exam:  General: well developed, well nourished, no distress.   Neurologic: Alert and oriented. Thought content appropriate.  GCS: Motor: 6/Verbal: 5/Eyes: 4 GCS Total: 15  Mental Status: Awake, Alert, Oriented x 4  Cranial nerves: face symmetric, tongue midline, CN II-XII grossly intact.   Head: normocephalic, atraumatic  Eyes: EOMI.  Neck: trachea midline, no JVD   Cardiovascular: no LE edema  Pulmonary: normal respirations, no signs of respiratory distress  Abdomen: soft, non-distended  Sensory: intact to light touch throughout  Skin: Skin is warm, dry and intact.    Motor Strength: Moves all extremities spontaneously with good tone.  Full strength upper and lower extremities. No abnormal movements seen.     Strength  Deltoids Triceps Biceps Wrist Extension Wrist Flexion Hand  Interossei   Upper: R 5/5 5/5 5/5 5/5 5/5 5/5 5/5    L 5/5 5/5 5/5 5/5 5/5 5/5 5/5     Iliopsoas Quadriceps Knee  Flexion Tibialis  anterior Gastro- cnemius EHL    Lower: R 5/5 5/5 5/5 5/5 5/5 5/5     L 5/5 5/5 5/5 5/5 5/5 5/5      DTR's: 2 + and symmetric in UE and LE  Rodney: absent  Clonus: absent    Gait: normal    Back: no tenderness to palpation       Diagnostic Results:  I have independently reviewed the following imaging and agree with findings:    MRI Lumbar Spine  There are 5 lumbar vertebrae.  Postoperative changes noted at L4-L5 and L5-S1 consistent with prior decompression.  There is compression fracture of L1 with severe height loss, unchanged.  Remaining vertebral body heights are maintained.  There is grade 1 anterolisthesis of L4-L5 and L5-S1.  There is a nondisplaced fracture involving the S2 sacral segment and left sacral ala, demonstrating mild angulation.   No evidence for marrow infiltrative process.  Disc heights are maintained, noting multilevel disc desiccation. Conus terminates at T12-L1 and appears unremarkable. Note made of renal cysts.  Penile prosthesis reservoir noted anterior to the urinary bladder.  Paraspinal musculature is moderately atrophied.  Evaluation of sacroiliac joints is unremarkable.    T12-L1: Mild retropulsion of L1 vertebral body effaces the ventral thecal sac. No spinal canal stenosis or neuroforaminal narrowing.    L1-L2: No spinal canal stenosis or neuroforaminal narrowing.    L2-L3: Mild bilateral facet arthropathy noted.  No spinal canal stenosis or neuroforaminal narrowing.    L3-L4: Mild bilateral facet arthropathy noted.  No spinal canal stenosis or neuroforaminal narrowing.    L4-L5: Status post left laminectomy.  Circumferential disc bulge and moderate to severe bilateral facet arthropathy result in effacement of the dorsal thecal sac and mild right, moderate left neuroforaminal narrowing.    L5-S1: Status post bilateral laminectomy.  Uncovering of the intervertebral disc along with disc bulge and severe bilateral facet arthropathy noted.  No spinal canal stenosis or neuroforaminal narrowing.     Impression    1.  Acute fracture of the S2 vertebral body and left sacral ala, likely insufficiency.  Recommend followup MRI pelvis in 3 months to ensure improvement in marrow signal and absence of underlying neoplasm.  2.  Degenerative and postoperative changes of lumbar spine as detailed above.         ASSESSMENT/PLAN:     83 yo male with osteoporosis, sacral fracture, chronic L1 compression fracture, chronic low back pain.     -S1 vertebroplasty  -Referral to pain for T11-12, T12-L1, L1-2 MBB with steroid injection  -Fosamax 10 mg daily  -Vitamin D 400 units BID  -Calcium 500 units BID  -Discussed with Dr. Faheem Mayen explained the natural history of the disease and all treatment options. He recommended S1 vertebroplasty.   Faheem and the patient have discussed the risks and benefits. Patient understands the risks and would like to proceed with surgery.      Kandy Black PA-C  Neurosurgery

## 2017-06-06 ENCOUNTER — TELEPHONE (OUTPATIENT)
Dept: NEUROSURGERY | Facility: CLINIC | Age: 82
End: 2017-06-06

## 2017-06-06 DIAGNOSIS — M80.08XA VERTEBRAL FRACTURE, OSTEOPOROTIC, INITIAL ENCOUNTER: Primary | ICD-10-CM

## 2017-06-12 ENCOUNTER — HOSPITAL ENCOUNTER (OUTPATIENT)
Dept: RADIOLOGY | Facility: HOSPITAL | Age: 82
Discharge: HOME | End: 2017-06-12
Attending: INTERNAL MEDICINE
Payer: MEDICARE

## 2017-06-12 ENCOUNTER — OFFICE VISIT (OUTPATIENT)
Dept: INTERNAL MEDICINE | Facility: CLINIC | Age: 82
End: 2017-06-12
Payer: MEDICARE

## 2017-06-12 VITALS
WEIGHT: 198.88 LBS | BODY MASS INDEX: 26.97 KG/M2 | TEMPERATURE: 98 F | HEART RATE: 79 BPM | SYSTOLIC BLOOD PRESSURE: 152 MMHG | RESPIRATION RATE: 18 BRPM | DIASTOLIC BLOOD PRESSURE: 84 MMHG

## 2017-06-12 DIAGNOSIS — I70.0 ATHEROSCLEROSIS OF AORTA: ICD-10-CM

## 2017-06-12 DIAGNOSIS — R94.31 ABNORMAL EKG: ICD-10-CM

## 2017-06-12 DIAGNOSIS — D69.2 SENILE PURPURA: ICD-10-CM

## 2017-06-12 DIAGNOSIS — S32.10XA CLOSED FRACTURE OF SACRUM, UNSPECIFIED PORTION OF SACRUM, INITIAL ENCOUNTER: ICD-10-CM

## 2017-06-12 DIAGNOSIS — I50.32 CHRONIC DIASTOLIC HEART FAILURE: ICD-10-CM

## 2017-06-12 DIAGNOSIS — I27.20 PULMONARY HYPERTENSION: ICD-10-CM

## 2017-06-12 DIAGNOSIS — N40.0 BENIGN NON-NODULAR PROSTATIC HYPERPLASIA WITHOUT LOWER URINARY TRACT SYMPTOMS: ICD-10-CM

## 2017-06-12 DIAGNOSIS — J44.9 CHRONIC OBSTRUCTIVE PULMONARY DISEASE, UNSPECIFIED COPD TYPE: ICD-10-CM

## 2017-06-12 DIAGNOSIS — Z01.818 PRE-OP EVALUATION: Primary | ICD-10-CM

## 2017-06-12 DIAGNOSIS — K21.9 GASTROESOPHAGEAL REFLUX DISEASE WITH HIATAL HERNIA: ICD-10-CM

## 2017-06-12 DIAGNOSIS — Z01.818 PRE-OP EVALUATION: ICD-10-CM

## 2017-06-12 DIAGNOSIS — F33.1 MDD (MAJOR DEPRESSIVE DISORDER), RECURRENT EPISODE, MODERATE: ICD-10-CM

## 2017-06-12 DIAGNOSIS — M47.816 LUMBAR ARTHROPATHY: ICD-10-CM

## 2017-06-12 DIAGNOSIS — T83.490S MALFUNCTION OF PENILE PROSTHESIS, SEQUELA: ICD-10-CM

## 2017-06-12 DIAGNOSIS — N18.30 CKD (CHRONIC KIDNEY DISEASE) STAGE 3, GFR 30-59 ML/MIN: ICD-10-CM

## 2017-06-12 DIAGNOSIS — K44.9 GASTROESOPHAGEAL REFLUX DISEASE WITH HIATAL HERNIA: ICD-10-CM

## 2017-06-12 DIAGNOSIS — R42 VERTIGO: ICD-10-CM

## 2017-06-12 PROCEDURE — 93010 ELECTROCARDIOGRAM REPORT: CPT | Mod: S$GLB,,, | Performed by: INTERNAL MEDICINE

## 2017-06-12 PROCEDURE — 99999 PR PBB SHADOW E&M-EST. PATIENT-LVL IV: CPT | Mod: PBBFAC,,, | Performed by: INTERNAL MEDICINE

## 2017-06-12 PROCEDURE — 1159F MED LIST DOCD IN RCRD: CPT | Mod: S$GLB,,, | Performed by: INTERNAL MEDICINE

## 2017-06-12 PROCEDURE — 99499 UNLISTED E&M SERVICE: CPT | Mod: S$GLB,,, | Performed by: INTERNAL MEDICINE

## 2017-06-12 PROCEDURE — 71020 XR CHEST PA AND LATERAL: CPT | Mod: TC,PO

## 2017-06-12 PROCEDURE — 1125F AMNT PAIN NOTED PAIN PRSNT: CPT | Mod: S$GLB,,, | Performed by: INTERNAL MEDICINE

## 2017-06-12 PROCEDURE — 99214 OFFICE O/P EST MOD 30 MIN: CPT | Mod: S$GLB,,, | Performed by: INTERNAL MEDICINE

## 2017-06-12 PROCEDURE — 71020 XR CHEST PA AND LATERAL: CPT | Mod: 26,,, | Performed by: RADIOLOGY

## 2017-06-12 PROCEDURE — 93005 ELECTROCARDIOGRAM TRACING: CPT | Mod: S$GLB,,, | Performed by: INTERNAL MEDICINE

## 2017-06-14 ENCOUNTER — OFFICE VISIT (OUTPATIENT)
Dept: CARDIOLOGY | Facility: CLINIC | Age: 82
End: 2017-06-14
Payer: MEDICARE

## 2017-06-14 VITALS
WEIGHT: 198.31 LBS | DIASTOLIC BLOOD PRESSURE: 62 MMHG | BODY MASS INDEX: 26.86 KG/M2 | OXYGEN SATURATION: 95 % | HEIGHT: 72 IN | SYSTOLIC BLOOD PRESSURE: 178 MMHG | HEART RATE: 80 BPM

## 2017-06-14 DIAGNOSIS — I08.0 MILD MITRAL AND AORTIC REGURGITATION: ICD-10-CM

## 2017-06-14 DIAGNOSIS — I51.89 DIASTOLIC DYSFUNCTION WITHOUT HEART FAILURE: ICD-10-CM

## 2017-06-14 DIAGNOSIS — I10 HTN (HYPERTENSION), BENIGN: ICD-10-CM

## 2017-06-14 DIAGNOSIS — E78.5 DYSLIPIDEMIA: ICD-10-CM

## 2017-06-14 DIAGNOSIS — Z01.818 PRE-OP EVALUATION: Primary | ICD-10-CM

## 2017-06-14 PROCEDURE — 99214 OFFICE O/P EST MOD 30 MIN: CPT | Mod: S$GLB,,, | Performed by: INTERNAL MEDICINE

## 2017-06-14 PROCEDURE — 1159F MED LIST DOCD IN RCRD: CPT | Mod: S$GLB,,, | Performed by: INTERNAL MEDICINE

## 2017-06-14 PROCEDURE — 99499 UNLISTED E&M SERVICE: CPT | Mod: S$GLB,,, | Performed by: INTERNAL MEDICINE

## 2017-06-14 PROCEDURE — 99999 PR PBB SHADOW E&M-EST. PATIENT-LVL III: CPT | Mod: PBBFAC,,, | Performed by: INTERNAL MEDICINE

## 2017-06-14 PROCEDURE — 1125F AMNT PAIN NOTED PAIN PRSNT: CPT | Mod: S$GLB,,, | Performed by: INTERNAL MEDICINE

## 2017-06-14 RX ORDER — AMLODIPINE BESYLATE 5 MG/1
5 TABLET ORAL DAILY
Qty: 30 TABLET | Refills: 11 | Status: SHIPPED | OUTPATIENT
Start: 2017-06-14 | End: 2018-05-30 | Stop reason: SDUPTHER

## 2017-06-14 NOTE — PROGRESS NOTES
Subjective:   Patient ID:  Leonid Delacruz Jr. is a 82 y.o. male who presents for follow-up of No chief complaint on file.      Problem List Items Addressed This Visit        Cardiac    Mild mitral and aortic regurgitation    HTN (hypertension), benign       Fluids/Electrolytes/Nutrition/GI    Dyslipidemia       Other    Diastolic dysfunction without heart failure      Other Visit Diagnoses     Pre-op evaluation    -  Primary          HPI: Patient with PMH of RBBB, HLD, and diastolic dysfunction present to have pre op evaluation before S1 vertebroplasty. ECG showed NSR with incomplete RBBB. BP is elevated. He denies chest pain and he has chronic dyspnea from COPD. No orthopnea or PND. No history of MI or CHF or CVA. He is able to walk 2 blocks with walker. He denies dizziness, palpitations or syncope. Surgery is planned in July. Echo done 2016 showed mild MR and AI with no severe obstructive valve disease.     BP is elevated and he is not on any BP medication. On previous visit to PCP BP have been mildly elevated or controlled.     Repeat /82    Review of Systems   Constitution: Negative.   HENT: Negative.    Eyes: Negative.    Cardiovascular: Negative.    Respiratory: Negative.    Endocrine: Negative.    Hematologic/Lymphatic: Negative.    Skin: Negative.    Gastrointestinal: Negative.    Neurological: Negative.    Psychiatric/Behavioral: Negative.    Allergic/Immunologic: Negative.        Patient's Medications   New Prescriptions    AMLODIPINE (NORVASC) 5 MG TABLET    Take 1 tablet (5 mg total) by mouth once daily.   Previous Medications    ALBUTEROL (PROAIR HFA) 90 MCG/ACTUATION INHALER    2 HFA Aerosol Inhaler Inhalation Four times a day p;rn    ASPIRIN 81 MG CHEW    Take 81 mg by mouth once daily.    CALCIUM CARBONATE (OS-JOANA) 500 MG CALCIUM (1,250 MG) TABLET    Take 1 tablet (500 mg total) by mouth 2 (two) times daily.    CHOLECALCIFEROL, VITAMIN D3, 400 UNIT CAP    Take 1 capsule (400 Units total) by  mouth 2 (two) times daily.    OMEPRAZOLE (PRILOSEC) 40 MG CAPSULE    TAKE 1 CAPSULE BY MOUTH TWICE DAILY BEFORE MEALS    SERTRALINE (ZOLOFT) 100 MG TABLET    TAKE ONE TABLET BY MOUTH ONCE DAILY.    SYMBICORT 160-4.5 MCG/ACTUATION HFAA    INHALE 2 PUFFS INTO LUNGS EVERY 12 HOURS    TAMSULOSIN (FLOMAX) 0.4 MG CP24    TAKE 1 CAPSULE BY MOUTH ONCE DAILY    TRAMADOL (ULTRAM) 50 MG TABLET    Take 1-2 tabs PO q8 hrs PRN   Modified Medications    No medications on file   Discontinued Medications    ALENDRONATE (FOSAMAX) 10 MG TAB    Take 1 tablet (10 mg total) by mouth once daily.       Objective:   Physical Exam   Constitutional: He is oriented to person, place, and time. He appears well-developed and well-nourished. No distress.   Examination of the digits showed no clubbing or cyanosis   HENT:   Head: Normocephalic and atraumatic.   Eyes: Conjunctivae are normal. Pupils are equal, round, and reactive to light. Right eye exhibits no discharge.   Neck: Normal range of motion. Neck supple. No JVD present. No thyromegaly present.   No carotid bruits   Cardiovascular: Normal rate, regular rhythm, S1 normal, S2 normal, normal heart sounds, intact distal pulses and normal pulses.  PMI is not displaced.  Exam reveals no gallop, no friction rub and no opening snap.    No murmur heard.  Pulmonary/Chest: Effort normal and breath sounds normal. No respiratory distress. He has no wheezes. He has no rales. He exhibits no tenderness.   Abdominal: Soft. Bowel sounds are normal. He exhibits no distension and no mass. There is no tenderness. There is no guarding.   No hepatosplenomegaly   Musculoskeletal: Normal range of motion. He exhibits no edema or tenderness.   Lymphadenopathy:     He has no cervical adenopathy.   Neurological: He is alert and oriented to person, place, and time.   Skin: Skin is warm. No rash noted. He is not diaphoretic. No erythema.   Psychiatric: He has a normal mood and affect.   Nursing note and vitals  reviewed.      ECGs reviewed-NSR with incomplete RBBB  LABS reviewed- normal creatinine, mildly anemic  Imaging including Echoes reviewed- 55% ef with DD and mild AI and MR    Assessment:     1. Pre-op evaluation    2. Diastolic dysfunction without heart failure    3. Dyslipidemia    4. Mild mitral and aortic regurgitation    5. HTN (hypertension), benign        Plan:     Patient is moderate risk for intermediate risk surgery. Mets>4. He is an active 83 y/o male. No contraindication to surgery such as recent MI, CHF, tachyarrhythmias or severe obstructive valve disease.     Will start norvasc 5 mg po daily  Low salt diet  Activity as tolerate     F/u in 6 months  Clinic time spent with this patient is 20 minutes.

## 2017-06-14 NOTE — LETTER
June 14, 2017        Jose J Mayen MD  [address]  [address]  [facility] [address]             [facility]  [address]  [facility] [address]  Phone: [phone]   Patient: Leonid Delacruz Jr.   MR Number: [id number]   YOB: 1934   Date of Visit: 6/14/2017       Dear Dr. Mayen:    Thank you for referring Leonid Delacruz to me for evaluation. Below are the relevant portions of my assessment and plan of care.       ECGs reviewed-NSR with incomplete RBBB  LABS reviewed- normal creatinine, mildly anemic  Imaging including Echoes reviewed- 55% ef with DD and mild AI and MR     Assessment:      1. Pre-op evaluation    2. Diastolic dysfunction without heart failure    3. Dyslipidemia    4. Mild mitral and aortic regurgitation          Plan:      Patient is moderate risk for intermediate risk surgery. Mets>4. He is an active 83 y/o male. No contraindication to surgery such as recent MI, CHF, tachyarrhythmias or severe obstructive valve disease.      Will start norvasc 5 mg po daily  Low salt diet  Activity as tolerate      F/u in 6 months                 If you have questions, please do not hesitate to call me. I look forward to following Leonid along with you.    Sincerely,      Rizwan Vazquez MD           CC  No Recipients

## 2017-06-29 ENCOUNTER — ANESTHESIA EVENT (OUTPATIENT)
Dept: SURGERY | Facility: HOSPITAL | Age: 82
End: 2017-06-29
Payer: MEDICARE

## 2017-06-29 ENCOUNTER — HOSPITAL ENCOUNTER (OUTPATIENT)
Dept: PREADMISSION TESTING | Facility: HOSPITAL | Age: 82
Discharge: HOME | End: 2017-06-29
Attending: NEUROLOGICAL SURGERY
Payer: MEDICARE

## 2017-06-29 VITALS
HEART RATE: 77 BPM | SYSTOLIC BLOOD PRESSURE: 147 MMHG | OXYGEN SATURATION: 95 % | BODY MASS INDEX: 27.09 KG/M2 | HEIGHT: 72 IN | RESPIRATION RATE: 20 BRPM | WEIGHT: 200 LBS | DIASTOLIC BLOOD PRESSURE: 81 MMHG

## 2017-06-29 RX ORDER — IPRATROPIUM BROMIDE AND ALBUTEROL SULFATE 2.5; .5 MG/3ML; MG/3ML
3 SOLUTION RESPIRATORY (INHALATION)
Status: CANCELLED | OUTPATIENT
Start: 2017-06-29 | End: 2017-06-30

## 2017-06-29 RX ORDER — LIDOCAINE HYDROCHLORIDE 10 MG/ML
1 INJECTION, SOLUTION EPIDURAL; INFILTRATION; INTRACAUDAL; PERINEURAL ONCE
Status: CANCELLED | OUTPATIENT
Start: 2017-06-29 | End: 2017-06-29

## 2017-06-29 RX ORDER — SODIUM CHLORIDE 9 MG/ML
INJECTION, SOLUTION INTRAVENOUS CONTINUOUS
Status: CANCELLED | OUTPATIENT
Start: 2017-06-29

## 2017-06-29 NOTE — ANESTHESIA PREPROCEDURE EVALUATION
"                                                                                                             06/29/2017  Leonid Delacruz Jr. is a 82 y.o., male is scheduled for S1 vertebroplasty under GETA on 7/07/2017.    Cardiology H&P and clearance with moderate risk stratification in [facility] 6/14/2017.    PCP H&P and clearance with risk stratification in [facility] on 6/12/2017.    Past Surgical History:   Procedure Laterality Date    APPENDECTOMY      BACK SURGERY      CATARACT EXTRACTION W/  INTRAOCULAR LENS IMPLANT  8/20/2001, 10/01/2001    COLONOSCOPY  6/06/2011    ESOPHAGOGASTRODUODENOSCOPY  5/02/2010, 8/11/2010, 3/21/2011, 6/06/2011    LAMINOTOMY  5/20/2003    Left L4-L5    PENILE PROSTHESIS IMPLANT      ROTATOR CUFF REPAIR      Bilateral    TONSILLECTOMY, ADENOIDECTOMY      VASECTOMY       Anesthesia Evaluation    I have reviewed the Patient Summary Reports.    I have reviewed the Nursing Notes.   I have reviewed the Medications.     Review of Systems  Anesthesia Hx:  No problems with previous Anesthesia  History of prior surgery of interest to airway management or planning: Previous anesthesia: General, MAC   Procedure performed at an [facility]. Denies Family Hx of Anesthesia complications.   Denies Personal Hx of Anesthesia complications.   Social:  Non-Smoker, Alcohol Use    Hematology/Oncology:  Hematology Normal      Hematology Comments: On ASA for primary prevention; pt will stop ASA today and will resume in post-op phase 24 hours after procedure or when risk of bleeding diminished; pt verbalized understanding   EENT/Dental:EENT/Dental Normal   Cardiovascular:   Exercise tolerance: poor Hypertension, well controlled Valvular problems/Murmurs (aortic "sclerosis" seen on echo 2016), AS Denies MI.  Dysrhythmias (seen on EKG 2013; pt states no known hx of a-fib and no known episodes since that time) atrial fibrillation  Denies Angina.     GREGORY ECG has been reviewed.    Pulmonary:   COPD (O2 sat " RA: 95%; does not use home O2), severe Shortness of breath Denies Recent URI. No recent hospitalization or exacerbations for COPD   Renal/:   Chronic Renal Disease (baseline Cr since 2015 1.3-1.8 with most recent 1.3 on 6/12/2017), CRI BPH    Hepatic/GI:   PUD, (resolved) GERD, well controlled    Musculoskeletal:   Arthritis (right shoulder pain and hands)  Closed S1 fracture with 10/10 pain Spine Disorders: lumbar    Neurological:  Neurology Normal    Endocrine:  Endocrine Normal    Psych:   depression          Physical Exam  General:  Well nourished    Airway/Jaw/Neck:  Airway Findings: Mouth Opening: Normal Tongue: Normal  General Airway Assessment: Adult  Mallampati: I  TM Distance: Normal, at least 6 cm  Jaw/Neck Findings:  Neck ROM: Extension Decreased, Mod.  Neck Findings:  Short Neck, Girth Increased     Eyes/Ears/Nose:  EYES/EARS/NOSE FINDINGS: Normal   Dental:  Dental Findings: In tact, Periodontal disease, Severe    Chest/Lungs:  Chest/Lungs Findings: Expiratory Wheezes, Mod., Tachypnea  Pursed lip breathing and intermittent breathlessness noted during exam    Inspiratory wheezes left mid lung field and decreased bilateral bases         Heart/Vascular:  Heart Findings: Normal Heart murmur: negative    Abdomen:  Abdomen Findings: Normal    Musculoskeletal:  Musculoskeletal Findings: (sacrum) Tender Joint     Mental Status:  Mental Status Findings: Normal      EKG 6/12/2017  Possible Anterolateral infarct (cited on or before 20-APR-2015)  Abnormal ECG  When compared with ECG of 31-DEC-2015 11:36,  No significant change was found  Confirmed by HERMILO ANDERS MD ([id number]) on 6/12/2017 12:20:02 PM    2D Echo w/CFD 1/2016  CONCLUSIONS     1 - Upper limit of normal ascending aorta.     2 - Normal left ventricular systolic function (EF 55-60%).     3 - Left ventricular diastolic dysfunction.     4 - Normal right ventricular systolic function .     5 - Aortic sclerosis.     6 - Mild aortic regurgitation.     7 -  Mild mitral regurgitation.   This document has been electronically    SIGNED BY: Jamieyaalix    CXR 6/12/2017  Heart size is normal.  Bibasal interstitial infiltrates identified similar to the previous study.  The upper lung fields are clear  The right costophrenic angle is slightly blunted laterally.  Changes of COPD.  Electronically signed by: Ishaan Gilman MD  Date: 06/12/17  Time: 08:27     Lab Results   Component Value Date    WBC 6.87 06/12/2017    HGB 12.4 (L) 06/12/2017    HCT 38.8 (L) 06/12/2017    MCV 90 06/12/2017     06/12/2017       Chemistry        Component Value Date/Time     06/12/2017 0813    K 4.1 06/12/2017 0813     06/12/2017 0813    CO2 23 06/12/2017 0813    BUN 13 06/12/2017 0813    CREATININE 1.3 06/12/2017 0813    GLU 95 06/12/2017 0813        Component Value Date/Time    CALCIUM 9.1 06/12/2017 0813    ALKPHOS 94 02/04/2017 1030    AST 23 02/04/2017 1030    ALT 14 02/04/2017 1030    BILITOT 0.5 02/04/2017 1030    ESTGFRAFRICA 58.7 (A) 06/12/2017 0813    EGFRNONAA 50.8 (A) 06/12/2017 0813            Anesthesia Plan  Type of Anesthesia, risks & benefits discussed:  Anesthesia Type:  general  Patient's Preference: general  Intra-op Monitoring Plan:   Intra-op Monitoring Plan Comments:   Post Op Pain Control Plan:   Post Op Pain Control Plan Comments:   Induction:   IV  Beta Blocker:         Informed Consent: Patient understands risks and agrees with Anesthesia plan.  Questions answered. Anesthesia consent signed with patient.  ASA Score: 3     Day of Surgery Review of History & Physical:        Anesthesia Plan Notes: Pt with severe COPD and at increased risk for development of post-op pulmonary complications.    Cardiology H&P and clearance with moderate risk stratification in [facility] 6/14/2017.  PCP H&P and clearance with risk stratification in [facility] on 6/12/2017.                Ready For Surgery From Anesthesia Perspective.

## 2017-06-29 NOTE — DISCHARGE INSTRUCTIONS
Your surgery is scheduled for 7/6/17.    Please report to Outpatient Surgery Intake Office on the 2nd FLOOR at 8:15a.m.          INSTRUCTIONS IMPORTANT!!!  ¨ Do not eat or drink after 12 midnight-including water. OK to brush teeth, no   gum, candy or mints!    ¨ Take only these medicines with a small swallow of water-morning of surgery: Amlodipine, Omeprazole, symbicort, and albuterol        ____  No powder, lotions or creams to surgical area.  ____  Please remove all jewelry, including piercings and leave at home.  ____  No money or valuables needed. Please leave at home.  ____  Please bring any documents given by your doctor.  ____  If going home the same day, arrange for a ride home. You will not be able to             drive if Anesthesia was used.  ____  Wear loose fitting clothing. Allow for dressings, bandages.  ____  Stop Aspirin, Ibuprofen, Motrin and Aleve at least 3-5 days before surgery, unless otherwise instructed by your doctor, or the nurse.   You MAY use Tylenol/acetaminophen until day of surgery.  ____  Wash the surgical area with Hibiclens the night before surgery, and again the             morning of surgery.  Be sure to rinse hibiclens off completely (if instructed by nurse).  ____  If you take diabetic medication, do not take am of surgery unless instructed by Doctor.  ____  Call MD for temperature above 101 degrees.  ____ Stop taking any Fish Oil supplement or any Vitamins that contain Vitamin E at least 5 days prior to surgery.  ____ Do Not wear your contact lenses the day of your procedure.  You may wear your glasses.        I have read or had read and explained to me, and understand the above information.  Additional comments or instructions:  For additional questions call [phone]     Take a Hibiclens shower twice a day for 3 days prior to surgery, including the morning of surgery.   Gargle with Listerine twice a day for 3 days prior to surgery, including the morning of  surgery.      Pre-Op Bathing Instructions    Before surgery, you can play an important role in your own health.    Because skin is not sterile, we need to be sure that your skin is as free of germs as possible. By following the instructions below, you can reduce the number of germs on your skin before surgery.    IMPORTANT: You will need to shower with a special soap called Hibiclens*, available at any pharmacy.  If you are allergic to Chlorhexidine (the antiseptic in Hibiclens), use an antibacterial soap such as Dial Soap for your preoperative shower.  You will shower with Hibiclens both the night before your surgery and the morning of your surgery.  Do not use Hibiclens on the head, face or genitals to avoid injury to those areas.    STEP #1: THE NIGHT BEFORE YOUR SURGERY     1. Do not shave the area of your body where your surgery will be performed.  2. Shower and wash your hair and body as usual with your normal soap and shampoo.  3. Rinse your hair and body thoroughly after you shower to remove all soap residue.  4. With your hand, apply one packet of Hibiclens soap to the surgical site.   5. Wash the site gently for five (5) minutes. Do not scrub your skin too hard.   6. Do not wash with your regular soap after Hibiclens is used.  7. Rinse your body thoroughly.  8. Pat yourself dry with a clean, soft towel.  9. Do not use lotion, cream, or powder.  10. Wear clean clothes.    STEP #2: THE MORNING OF YOUR SURGERY     1. Repeat Step #1.    * Not to be used by people allergic to Chlorhexidine.          Anesthesia: General Anesthesia  Youre due to have surgery. During surgery, youll be given medication called anesthesia. (It is also called anesthetic.) This will keep you comfortable and pain-free. Your anesthesia provider will use general anesthesia. This sheet tells you more about it.  What is general anesthesia?     You are watched continuously during your procedure by the anesthesia provider   General  anesthesia puts you into a state like deep sleep. It goes into the bloodstream (IV anesthetics), into the lungs (gas anesthetics), or both. You feel nothing during the procedure. You will not remember it. During the procedure, the anesthesia provider monitors you continuously. He or she checks your heart rate and rhythm, blood pressure, breathing, and blood oxygen.  · IV Anesthetics. IV anesthetics are given through an IV line in your arm. Theyre often given first. This is so you are asleep before a gas anesthetic is started. Some kinds of IV anesthetics relieve pain. Others relax you. Your doctor will decide which kind is best in your case.  · Gas Anesthetics. Gas anesthetics are breathed into the lungs. They are often used to keep you asleep. They can be given through a facemask or a tube placed in your larynx or trachea (breathing tube).  ¨ If you have a facemask, your anesthesia provider will most likely place it over your nose and mouth while youre still awake. Youll breathe oxygen through the mask as your IV anesthetic is started. Gas anesthetic may be added through the mask.  ¨ If you have a tube in the larynx or trachea, it will be inserted into your throat after youre asleep.  Anesthesia tools and medications  You will likely have:  · IV anesthetics. These are put into an IV line into your bloodstream.  · Gas anesthetics. You breathe these anesthetics into your lungs, where they pass into your bloodstream.  · Pulse oximeter. This is a small clip that is attached to the end of your finger. This measures your blood oxygen level.  · Electrocardiography leads (electrodes). These are small sticky pads that are placed on your chest. They record your heart rate and rhythm.  · Blood pressure cuff. This reads your blood pressure.  Risks and possible complications  General anesthesia has some risks. These include:  · Breathing problems  · Nausea and vomiting  · Sore throat or hoarseness (usually  temporary)  · Allergic reaction to the anesthetic  · Irregular heartbeat (rare)  · Cardiac arrest (rare)   Anesthesia safety  · Follow all instructions you are given for how long not to eat or drink before your procedure.  · Be sure your doctor knows what medications and drugs you take. This includes over-the-counter medications, herbs, supplements, alcohol or other drugs. You will be asked when those were last taken.  · Have an adult family member or friend drive you home after the procedure.  · For the first 24 hours after your surgery:  ¨ Do not drive or use heavy equipment.  ¨ Have a trusted family member or spouse make important decisions or sign documents.  ¨ Avoid alcohol.  ¨ Have a responsible adult stay with you. He or she can watch for problems and help keep you safe.  Date Last Reviewed: 10/16/2014  © 1223-7426 [employer]. [address] [place] [address]. All rights reserved. This information is not intended as a substitute for professional medical care. Always follow your healthcare professional's instructions.        Vertebroplasty  Fractures in the bones of the spine (vertebrae) can cause severe back pain and loss of movement. Vertebroplasty is a procedure in which a type of surgical cement is injected into the fractured vertebrae. This can make the spine more stable and relieve back pain. The procedure is often done by a healthcare provider who specializes in radiology, orthopedic surgery, neurosurgery, or anesthesiology. However, an interventional radiologist most commonly does the vertebroplasty.    Before the procedure  Follow any instructions you are given on how to prepare, including:  · Do not eat or drink after midnight the night before the procedure.  · Tell your healthcare provider what medicines, herbs, or supplements you take; if you are, or may be, pregnant; or if you are allergic to seafood, iodine, contrast medium (X-ray dye), or other medicines.  During the  procedure  Here is what to expect:   · You will change into a hospital gown and lie face down on an X-ray table.  · An IV (intravenous) line is started to give you fluids and medicines. You may be given medicine through the IV to help you relax and make you feel sleepy.  · A local anesthetic will be injected into the back to numb the area. Then, a needle is inserted into the back.  · Contrast medium will be injected into the area. This helps show the needle and vertebrae clearly on X-rays. Using video X-ray images as a guide, the healthcare provider moves the needle to the vertebra to be treated.  · A cement-like plastic material is injected into the vertebra. The procedure is repeated on other vertebrae if necessary.  · The entire procedure may take several hours, depending on how many vertebrae are being treated.  After the procedure  Here is what to expect:   · You will be asked to lie flat for 1 hour to 2 hours after the procedure while the cement hardens.  · You will most likely be able to go home within a few hours. Or you may need to stay in the hospital overnight.  · You may feel an ache at the puncture sites for the next 24 to 48 hours. To ease this pain, use ice and pain medicines as directed.  · Drink plenty of water to help flush the contrast medium from your system.  · You may be able to go back to your normal light activities in a day or so. You may have to wait for several days or weeks for more vigorous activities.   Potential risks and complications  Risks and complications after a vertebroplasty include:   · Bleeding  · Infection  · Rib or vertebral fracture  · Irritation of nearby nerves  · Worsening of pain  · Problems due to contrast medium, including allergic reaction or kidney damage  · Leakage of cement, needing surgery to remove it (very rare)  · Spinal cord damage (very rare)   Date Last Reviewed: 10/9/2015  © 1650-3144 [employer]. [address] [place] [address], [place] [address].  All rights reserved. This information is not intended as a substitute for professional medical care. Always follow your healthcare professional's instructions.

## 2017-07-06 ENCOUNTER — HOSPITAL ENCOUNTER (OUTPATIENT)
Facility: HOSPITAL | Age: 82
Discharge: HOME | End: 2017-07-06
Attending: NEUROLOGICAL SURGERY | Admitting: NEUROLOGICAL SURGERY
Payer: MEDICARE

## 2017-07-06 ENCOUNTER — SURGERY (OUTPATIENT)
Age: 82
End: 2017-07-06

## 2017-07-06 ENCOUNTER — ANESTHESIA (OUTPATIENT)
Dept: SURGERY | Facility: HOSPITAL | Age: 82
End: 2017-07-06
Payer: MEDICARE

## 2017-07-06 VITALS
WEIGHT: 200 LBS | HEIGHT: 72 IN | TEMPERATURE: 98 F | DIASTOLIC BLOOD PRESSURE: 74 MMHG | RESPIRATION RATE: 20 BRPM | HEART RATE: 65 BPM | SYSTOLIC BLOOD PRESSURE: 142 MMHG | BODY MASS INDEX: 27.09 KG/M2 | OXYGEN SATURATION: 94 %

## 2017-07-06 DIAGNOSIS — M80.08XG: ICD-10-CM

## 2017-07-06 DIAGNOSIS — M80.08XA: Primary | ICD-10-CM

## 2017-07-06 PROCEDURE — 25000003 PHARM REV CODE 250: Performed by: NURSE ANESTHETIST, CERTIFIED REGISTERED

## 2017-07-06 PROCEDURE — C1713 ANCHOR/SCREW BN/BN,TIS/BN: HCPCS | Performed by: NEUROLOGICAL SURGERY

## 2017-07-06 PROCEDURE — 63600175 PHARM REV CODE 636 W HCPCS: Performed by: PHYSICIAN ASSISTANT

## 2017-07-06 PROCEDURE — C1751 CATH, INF, PER/CENT/MIDLINE: HCPCS | Performed by: NURSE ANESTHETIST, CERTIFIED REGISTERED

## 2017-07-06 PROCEDURE — 22511 PERQ LUMBOSACRAL INJECTION: CPT | Mod: GC,,, | Performed by: NEUROLOGICAL SURGERY

## 2017-07-06 PROCEDURE — 25500020 PHARM REV CODE 255: Performed by: NEUROLOGICAL SURGERY

## 2017-07-06 PROCEDURE — 37000009 HC ANESTHESIA EA ADD 15 MINS: Performed by: NEUROLOGICAL SURGERY

## 2017-07-06 PROCEDURE — 27200677 HC TRANSDUCER MONITOR KIT SINGLE: Performed by: NURSE ANESTHETIST, CERTIFIED REGISTERED

## 2017-07-06 PROCEDURE — 27201423 OPTIME MED/SURG SUP & DEVICES STERILE SUPPLY: Performed by: NEUROLOGICAL SURGERY

## 2017-07-06 PROCEDURE — 25000242 PHARM REV CODE 250 ALT 637 W/ HCPCS: Performed by: NURSE ANESTHETIST, CERTIFIED REGISTERED

## 2017-07-06 PROCEDURE — 63600175 PHARM REV CODE 636 W HCPCS: Performed by: NURSE ANESTHETIST, CERTIFIED REGISTERED

## 2017-07-06 PROCEDURE — 25000003 PHARM REV CODE 250: Performed by: NEUROLOGICAL SURGERY

## 2017-07-06 PROCEDURE — 71000016 HC POSTOP RECOV ADDL HR: Performed by: NEUROLOGICAL SURGERY

## 2017-07-06 PROCEDURE — 36000707: Performed by: NEUROLOGICAL SURGERY

## 2017-07-06 PROCEDURE — 25000003 PHARM REV CODE 250: Performed by: PHYSICIAN ASSISTANT

## 2017-07-06 PROCEDURE — 71000033 HC RECOVERY, INTIAL HOUR: Performed by: NEUROLOGICAL SURGERY

## 2017-07-06 PROCEDURE — 36000706: Performed by: NEUROLOGICAL SURGERY

## 2017-07-06 PROCEDURE — 71000015 HC POSTOP RECOV 1ST HR: Performed by: NEUROLOGICAL SURGERY

## 2017-07-06 PROCEDURE — 25000003 PHARM REV CODE 250: Performed by: NURSE PRACTITIONER

## 2017-07-06 PROCEDURE — 37000008 HC ANESTHESIA 1ST 15 MINUTES: Performed by: NEUROLOGICAL SURGERY

## 2017-07-06 RX ORDER — TRAMADOL HYDROCHLORIDE 50 MG/1
50 TABLET ORAL EVERY 6 HOURS PRN
Status: DISCONTINUED | OUTPATIENT
Start: 2017-07-06 | End: 2017-07-06 | Stop reason: HOSPADM

## 2017-07-06 RX ORDER — SODIUM CHLORIDE 9 MG/ML
INJECTION, SOLUTION INTRAVENOUS CONTINUOUS
Status: DISCONTINUED | OUTPATIENT
Start: 2017-07-06 | End: 2017-07-06 | Stop reason: HOSPADM

## 2017-07-06 RX ORDER — IPRATROPIUM BROMIDE AND ALBUTEROL SULFATE 2.5; .5 MG/3ML; MG/3ML
3 SOLUTION RESPIRATORY (INHALATION)
Status: DISCONTINUED | OUTPATIENT
Start: 2017-07-06 | End: 2017-07-06 | Stop reason: HOSPADM

## 2017-07-06 RX ORDER — SODIUM CHLORIDE 0.9 % (FLUSH) 0.9 %
3 SYRINGE (ML) INJECTION EVERY 8 HOURS
Status: DISCONTINUED | OUTPATIENT
Start: 2017-07-06 | End: 2017-07-06 | Stop reason: HOSPADM

## 2017-07-06 RX ORDER — TRAMADOL HYDROCHLORIDE 50 MG/1
TABLET ORAL
Qty: 60 TABLET | Refills: 0 | Status: SHIPPED | OUTPATIENT
Start: 2017-07-06 | End: 2017-11-07 | Stop reason: SDUPTHER

## 2017-07-06 RX ORDER — LIDOCAINE HCL/PF 100 MG/5ML
SYRINGE (ML) INTRAVENOUS
Status: DISCONTINUED | OUTPATIENT
Start: 2017-07-06 | End: 2017-07-06

## 2017-07-06 RX ORDER — ONDANSETRON 8 MG/1
8 TABLET, ORALLY DISINTEGRATING ORAL EVERY 6 HOURS PRN
Status: DISCONTINUED | OUTPATIENT
Start: 2017-07-06 | End: 2017-07-06 | Stop reason: HOSPADM

## 2017-07-06 RX ORDER — BUPIVACAINE HCL/EPINEPHRINE 0.25-.0005
VIAL (ML) INJECTION
Status: DISCONTINUED | OUTPATIENT
Start: 2017-07-06 | End: 2017-07-06 | Stop reason: HOSPADM

## 2017-07-06 RX ORDER — OXYCODONE HYDROCHLORIDE 5 MG/1
5 TABLET ORAL
Status: DISCONTINUED | OUTPATIENT
Start: 2017-07-06 | End: 2017-07-06 | Stop reason: HOSPADM

## 2017-07-06 RX ORDER — ACETAMINOPHEN 10 MG/ML
1000 INJECTION, SOLUTION INTRAVENOUS
Status: COMPLETED | OUTPATIENT
Start: 2017-07-06 | End: 2017-07-06

## 2017-07-06 RX ORDER — ROCURONIUM BROMIDE 10 MG/ML
INJECTION, SOLUTION INTRAVENOUS
Status: DISCONTINUED | OUTPATIENT
Start: 2017-07-06 | End: 2017-07-06

## 2017-07-06 RX ORDER — PREGABALIN 50 MG/1
50 CAPSULE ORAL
Status: COMPLETED | OUTPATIENT
Start: 2017-07-06 | End: 2017-07-06

## 2017-07-06 RX ORDER — DEXAMETHASONE SODIUM PHOSPHATE 4 MG/ML
INJECTION, SOLUTION INTRA-ARTICULAR; INTRALESIONAL; INTRAMUSCULAR; INTRAVENOUS; SOFT TISSUE
Status: DISCONTINUED | OUTPATIENT
Start: 2017-07-06 | End: 2017-07-06

## 2017-07-06 RX ORDER — METOPROLOL TARTRATE 1 MG/ML
INJECTION, SOLUTION INTRAVENOUS
Status: DISCONTINUED | OUTPATIENT
Start: 2017-07-06 | End: 2017-07-06

## 2017-07-06 RX ORDER — GLYCOPYRROLATE 0.2 MG/ML
INJECTION INTRAMUSCULAR; INTRAVENOUS
Status: DISCONTINUED | OUTPATIENT
Start: 2017-07-06 | End: 2017-07-06

## 2017-07-06 RX ORDER — LIDOCAINE HYDROCHLORIDE 10 MG/ML
1 INJECTION, SOLUTION EPIDURAL; INFILTRATION; INTRACAUDAL; PERINEURAL ONCE
Status: DISCONTINUED | OUTPATIENT
Start: 2017-07-06 | End: 2017-07-06 | Stop reason: HOSPADM

## 2017-07-06 RX ORDER — SUCCINYLCHOLINE CHLORIDE 20 MG/ML
INJECTION INTRAMUSCULAR; INTRAVENOUS
Status: DISCONTINUED | OUTPATIENT
Start: 2017-07-06 | End: 2017-07-06

## 2017-07-06 RX ORDER — SODIUM CHLORIDE 0.9 % (FLUSH) 0.9 %
3 SYRINGE (ML) INJECTION
Status: DISCONTINUED | OUTPATIENT
Start: 2017-07-06 | End: 2017-07-06 | Stop reason: HOSPADM

## 2017-07-06 RX ORDER — CEFAZOLIN SODIUM 2 G/50ML
2 SOLUTION INTRAVENOUS
Status: COMPLETED | OUTPATIENT
Start: 2017-07-06 | End: 2017-07-06

## 2017-07-06 RX ORDER — CYCLOBENZAPRINE HCL 5 MG
5 TABLET ORAL
Status: COMPLETED | OUTPATIENT
Start: 2017-07-06 | End: 2017-07-06

## 2017-07-06 RX ORDER — ONDANSETRON 2 MG/ML
INJECTION INTRAMUSCULAR; INTRAVENOUS
Status: DISCONTINUED | OUTPATIENT
Start: 2017-07-06 | End: 2017-07-06

## 2017-07-06 RX ORDER — NEOSTIGMINE METHYLSULFATE 1 MG/ML
INJECTION, SOLUTION INTRAVENOUS
Status: DISCONTINUED | OUTPATIENT
Start: 2017-07-06 | End: 2017-07-06

## 2017-07-06 RX ORDER — CELECOXIB 100 MG/1
200 CAPSULE ORAL
Status: COMPLETED | OUTPATIENT
Start: 2017-07-06 | End: 2017-07-06

## 2017-07-06 RX ORDER — SODIUM CHLORIDE, SODIUM LACTATE, POTASSIUM CHLORIDE, CALCIUM CHLORIDE 600; 310; 30; 20 MG/100ML; MG/100ML; MG/100ML; MG/100ML
INJECTION, SOLUTION INTRAVENOUS CONTINUOUS PRN
Status: DISCONTINUED | OUTPATIENT
Start: 2017-07-06 | End: 2017-07-06

## 2017-07-06 RX ORDER — PROPOFOL 10 MG/ML
VIAL (ML) INTRAVENOUS
Status: DISCONTINUED | OUTPATIENT
Start: 2017-07-06 | End: 2017-07-06

## 2017-07-06 RX ORDER — OXYCODONE HCL 10 MG/1
10 TABLET, FILM COATED, EXTENDED RELEASE ORAL
Status: COMPLETED | OUTPATIENT
Start: 2017-07-06 | End: 2017-07-06

## 2017-07-06 RX ORDER — HYDROMORPHONE HYDROCHLORIDE 2 MG/ML
0.5 INJECTION, SOLUTION INTRAMUSCULAR; INTRAVENOUS; SUBCUTANEOUS EVERY 5 MIN PRN
Status: DISCONTINUED | OUTPATIENT
Start: 2017-07-06 | End: 2017-07-06 | Stop reason: HOSPADM

## 2017-07-06 RX ORDER — HYDROMORPHONE HYDROCHLORIDE 2 MG/ML
0.2 INJECTION, SOLUTION INTRAMUSCULAR; INTRAVENOUS; SUBCUTANEOUS EVERY 5 MIN PRN
Status: DISCONTINUED | OUTPATIENT
Start: 2017-07-06 | End: 2017-07-06 | Stop reason: HOSPADM

## 2017-07-06 RX ORDER — ALBUTEROL SULFATE 90 UG/1
AEROSOL, METERED RESPIRATORY (INHALATION)
Status: DISCONTINUED | OUTPATIENT
Start: 2017-07-06 | End: 2017-07-06

## 2017-07-06 RX ORDER — MUPIROCIN 20 MG/G
1 OINTMENT TOPICAL
Status: COMPLETED | OUTPATIENT
Start: 2017-07-06 | End: 2017-07-06

## 2017-07-06 RX ORDER — FENTANYL CITRATE 50 UG/ML
INJECTION, SOLUTION INTRAMUSCULAR; INTRAVENOUS
Status: DISCONTINUED | OUTPATIENT
Start: 2017-07-06 | End: 2017-07-06

## 2017-07-06 RX ORDER — PHENYLEPHRINE HYDROCHLORIDE 10 MG/ML
INJECTION INTRAVENOUS
Status: DISCONTINUED | OUTPATIENT
Start: 2017-07-06 | End: 2017-07-06

## 2017-07-06 RX ADMIN — ROCURONIUM BROMIDE 30 MG: 10 INJECTION, SOLUTION INTRAVENOUS at 01:07

## 2017-07-06 RX ADMIN — NEOSTIGMINE METHYLSULFATE 5 MG: 1 INJECTION INTRAVENOUS at 02:07

## 2017-07-06 RX ADMIN — PROPOFOL 100 MG: 10 INJECTION, EMULSION INTRAVENOUS at 01:07

## 2017-07-06 RX ADMIN — ONDANSETRON 4 MG: 2 INJECTION, SOLUTION INTRAMUSCULAR; INTRAVENOUS at 02:07

## 2017-07-06 RX ADMIN — PHENYLEPHRINE HYDROCHLORIDE 200 MCG: 10 INJECTION INTRAVENOUS at 02:07

## 2017-07-06 RX ADMIN — ROCURONIUM BROMIDE 20 MG: 10 INJECTION, SOLUTION INTRAVENOUS at 02:07

## 2017-07-06 RX ADMIN — CEFAZOLIN SODIUM 2 G: 2 SOLUTION INTRAVENOUS at 01:07

## 2017-07-06 RX ADMIN — GLYCOPYRROLATE 0.6 MG: 0.2 INJECTION, SOLUTION INTRAMUSCULAR; INTRAVENOUS at 02:07

## 2017-07-06 RX ADMIN — SODIUM CHLORIDE, SODIUM LACTATE, POTASSIUM CHLORIDE, AND CALCIUM CHLORIDE: .6; .31; .03; .02 INJECTION, SOLUTION INTRAVENOUS at 08:07

## 2017-07-06 RX ADMIN — FENTANYL CITRATE 150 MCG: 50 INJECTION, SOLUTION INTRAMUSCULAR; INTRAVENOUS at 01:07

## 2017-07-06 RX ADMIN — OXYCODONE HYDROCHLORIDE 10 MG: 10 TABLET, FILM COATED, EXTENDED RELEASE ORAL at 08:07

## 2017-07-06 RX ADMIN — CYCLOBENZAPRINE HYDROCHLORIDE 5 MG: 5 TABLET, FILM COATED ORAL at 08:07

## 2017-07-06 RX ADMIN — CELECOXIB 200 MG: 100 CAPSULE ORAL at 08:07

## 2017-07-06 RX ADMIN — METOPROLOL TARTRATE 2 MG: 5 INJECTION, SOLUTION INTRAVENOUS at 02:07

## 2017-07-06 RX ADMIN — DEXAMETHASONE SODIUM PHOSPHATE 4 MG: 4 INJECTION, SOLUTION INTRAMUSCULAR; INTRAVENOUS at 01:07

## 2017-07-06 RX ADMIN — SUCCINYLCHOLINE CHLORIDE 100 MG: 20 INJECTION, SOLUTION INTRAMUSCULAR; INTRAVENOUS at 01:07

## 2017-07-06 RX ADMIN — IOHEXOL 50 ML: 300 INJECTION, SOLUTION INTRAVENOUS at 01:07

## 2017-07-06 RX ADMIN — ACETAMINOPHEN 1000 MG: 10 INJECTION, SOLUTION INTRAVENOUS at 09:07

## 2017-07-06 RX ADMIN — BUPIVACAINE HYDROCHLORIDE AND EPINEPHRINE 10 ML: 2.5; 5 INJECTION, SOLUTION INFILTRATION; PERINEURAL at 01:07

## 2017-07-06 RX ADMIN — MUPIROCIN 1 G: 20 OINTMENT TOPICAL at 09:07

## 2017-07-06 RX ADMIN — ALBUTEROL SULFATE 2 PUFF: 90 AEROSOL, METERED RESPIRATORY (INHALATION) at 02:07

## 2017-07-06 RX ADMIN — PROPOFOL 50 MG: 10 INJECTION, EMULSION INTRAVENOUS at 02:07

## 2017-07-06 RX ADMIN — LIDOCAINE HYDROCHLORIDE 80 MG: 20 INJECTION, SOLUTION INTRAVENOUS at 01:07

## 2017-07-06 RX ADMIN — PREGABALIN 50 MG: 50 CAPSULE ORAL at 08:07

## 2017-07-06 RX ADMIN — SODIUM CHLORIDE 10 ML/HR: 0.9 INJECTION, SOLUTION INTRAVENOUS at 09:07

## 2017-07-06 RX ADMIN — ONDANSETRON 4 MG: 2 INJECTION, SOLUTION INTRAMUSCULAR; INTRAVENOUS at 01:07

## 2017-07-06 RX ADMIN — FENTANYL CITRATE 25 MCG: 50 INJECTION, SOLUTION INTRAMUSCULAR; INTRAVENOUS at 02:07

## 2017-07-06 RX ADMIN — SODIUM CHLORIDE, SODIUM LACTATE, POTASSIUM CHLORIDE, AND CALCIUM CHLORIDE: .6; .31; .03; .02 INJECTION, SOLUTION INTRAVENOUS at 02:07

## 2017-07-06 NOTE — ANESTHESIA RELEASE NOTE
Anesthesia Release from PACU Note    Patient: Leonid Delacruz Jr.    Procedure(s) Performed: Procedure(s) (LRB):  VERTEBROPLASTY S1 Vertebroplasty   (N/A)    Anesthesia type: general    Post pain: Adequate analgesia    Post assessment: no apparent anesthetic complications, tolerated procedure well and no evidence of recall    Last Vitals:   Visit Vitals  BP (!) 159/85   Pulse 60   Temp 36.8 °C (98.2 °F)   Resp 18   Ht 6' (1.829 m)   Wt 90.7 kg (200 lb)   SpO2 96%   BMI 27.12 kg/m²       Post vital signs: stable    Level of consciousness: awake, alert  and oriented    Nausea/Vomiting: no nausea/no vomiting    Complications: none    Airway Patency: patent    Respiratory: unassisted, spontaneous ventilation    Cardiovascular: stable and blood pressure at baseline    Hydration: euvolemic

## 2017-07-06 NOTE — PLAN OF CARE
Outpatient discharge criteria met. Patient discharge instructions gone over with patient and family. Patient discharged home in wheel chair with son in good condition.

## 2017-07-06 NOTE — TRANSFER OF CARE
Anesthesia Transfer of Care Note    Patient: Leonid Delacruz Jr.    Procedure(s) Performed: Procedure(s) (LRB):  VERTEBROPLASTY S1 Vertebroplasty   (N/A)    Patient location: PACU    Anesthesia Type: general    Transport from OR: Transported from OR on 6-10 L/min O2 by face mask with adequate spontaneous ventilation    Post pain: adequate analgesia    Post assessment: no apparent anesthetic complications and tolerated procedure well    Post vital signs: stable    Level of consciousness: awake, alert and oriented    Nausea/Vomiting: no nausea/vomiting    Complications: none    Transfer of care protocol was followed      Last vitals:   Visit Vitals  BP (!) 203/88   Pulse 72   Temp 36.8 °C (98.2 °F)   Resp (!) 23   Ht 6' (1.829 m)   Wt 90.7 kg (200 lb)   SpO2 100%   BMI 27.12 kg/m²

## 2017-07-06 NOTE — DISCHARGE SUMMARY
[facility]  Neurosurgery  Discharge Summary      Patient Name: Leonid Delacruz Jr.  MRN: [id number]  Admission Date: 7/6/2017  Hospital Length of Stay: 0 days  Discharge Date and Time: 7/6/2017  6:30 PM  Attending Physician: Jose J Mayen MD   Discharging Provider: Kandy Black PA-C  Primary Care Provider: Sam Parkinson DO     HPI: Leonid Delacruz Jr. is a 82 y.o. male with severe osteoporosis with bilateral sacral insufficiency fractures and S1 compression fracture. His sacral pain has been refractory for more than 2 months.The risks of the procedure include hemorrhage, infection, paralysis, loss of sensation, loss of sphincter functions, death and postoperative medical  complication.    Procedure(s) (LRB):  VERTEBROPLASTY S1 Vertebroplasty   (N/A)     DATE OF PROCEDURE: 07/06/2017     PREOPERATIVE DIAGNOSES:  1. Bilateral sacral insufficiency fractures and S1 compression fracture  2. Refractory severe sacral pain        POSTOPERATIVE DIAGNOSES:  1. Bilateral sacral insufficiency fractures and S1 compression fracture  2. Refractory severe sacral pain     PROCEDURES:     1. Bilateral percutaneous S1 vertebroplasty using low viscosity PMMA .  2. Fluoroscopy.    Hospital Course:   Leonid Delacruz Jr. presented to [facility] on 7/6/2017 for the above stated procedure. he tolerated the procedure well and there were no intra-operative complications. he recovered in PACU and was then transferred to outpatient recovery, where his pain was controlled. On 7/6/2017, he was discharged home with pain medication and follow up appointments. Regular diet. Activity as tolerated. At the time of discharge, vital signs were stable, patient was afebrile and neurologically stable. Discharge instructions were given verbally/written to the patient and his family and all of their questions were answered. Patient and family voiced understanding. They were encouraged to call the clinic with any questions they might  have prior to the follow up appointments.    Consults: None    Significant Diagnostic Studies: None     Pending Diagnostic Studies:     None        Final Active Diagnoses:    Diagnosis Date Noted POA    PRINCIPAL PROBLEM:  Vertebral fracture, osteoporotic [M80.88XA] 07/06/2017 Yes      Problems Resolved During this Admission:    Diagnosis Date Noted Date Resolved POA      Discharged Condition: good    Disposition: Home or Self Care    Follow Up:  Follow-up Information     Kandy Black PA-C On 7/21/2017.    Specialty:  Neurosurgery  Contact information:  [address] [place] [address]  [address]  [facility] [address] [phone]                 Patient Instructions:     Diet general     Call MD for:  temperature >100.4     Call MD for:  persistent nausea and vomiting     Call MD for:  severe uncontrolled pain     Call MD for:  difficulty breathing, headache or visual disturbances     Call MD for:  redness, tenderness, or signs of infection (pain, swelling, redness, odor or green/yellow discharge around incision site)     Call MD for:  hives     Call MD for:  persistent dizziness or light-headedness     Call MD for:  extreme fatigue     Remove dressing in 48 hours       Medications:  Reconciled Home Medications:   Current Discharge Medication List      CONTINUE these medications which have CHANGED    Details   tramadol (ULTRAM) 50 mg tablet Take 1-2 tabs PO q6 hrs PRN  Qty: 60 tablet, Refills: 0         CONTINUE these medications which have NOT CHANGED    Details   albuterol (PROAIR HFA) 90 mcg/actuation inhaler 2 HFA Aerosol Inhaler Inhalation Four times a day p;rn  Qty: 3 Inhaler, Refills: 3    Associated Diagnoses: Simple chronic bronchitis; SOB (shortness of breath)      amlodipine (NORVASC) 5 MG tablet Take 1 tablet (5 mg total) by mouth once daily.  Qty: 30 tablet, Refills: 11      omeprazole (PRILOSEC) 40 MG capsule TAKE 1 CAPSULE BY MOUTH TWICE DAILY BEFORE MEALS  Qty: 60 capsule, Refills: 5    Associated Diagnoses:  Gastroesophageal reflux disease with hiatal hernia      sertraline (ZOLOFT) 100 MG tablet TAKE ONE TABLET BY MOUTH ONCE DAILY.  Qty: 90 tablet, Refills: 3      SYMBICORT 160-4.5 mcg/actuation HFAA INHALE 2 PUFFS INTO LUNGS EVERY 12 HOURS  Qty: 10.2 g, Refills: 0      tamsulosin (FLOMAX) 0.4 mg Cp24 TAKE 1 CAPSULE BY MOUTH ONCE DAILY  Qty: 90 capsule, Refills: 3    Comments: **Patient requests 90 days supply**      aspirin 81 MG Chew Take 81 mg by mouth once daily.      calcium carbonate (OS-JOANA) 500 mg calcium (1,250 mg) tablet Take 1 tablet (500 mg total) by mouth 2 (two) times daily.  Refills: 0      cholecalciferol, vitamin D3, 400 unit Cap Take 1 capsule (400 Units total) by mouth 2 (two) times daily.  Refills: 0             Kandy Black PA-C  Neurosurgery  [facility]

## 2017-07-06 NOTE — DISCHARGE INSTRUCTIONS
Patient Information  Ok to resume Aspirin in 48 hours (the evening of 7/8/17)  -No driving while taking narcotic pain medications  -Do not take any OTC products containing acetaminophen at the same time as you take your narcotic pain medication. Medications that may contain acetaminophen include but are not limited to: Excedrin and other headache medications, arthritis medications, cold and sinus medications, etc. Please review the list of active ingredients on any OTC medication prior to taking it.  -Do not take any Aspirin or Aspirin containing products for 2 weeks  -Do not take any Aleeve, Naprosyn, Naproxen, Ibuprofen, Advil or any other NSAID for 6 weeks.   -Do not consume any alcoholic beverages until released by your Neurosurgeon  -Do not perform any excessive bending over or leaning forward as this is a fall hazard.  -Do not perform any heavy lifting or lifting more than 10 lbs from the ground level as this is a fall hazard.    Contact the Neurosurgery Office immediately if:  -If you begin to notice any neurologic changes such as:           -Sudden onset of lethargy or sleepiness           -Sudden confusion, trouble speaking, or understanding            -Sudden trouble seeing in one or both eyes            -Sudden trouble walking, dizziness, loss of coordination            -Sudden severe headache with no known cause            -Sudden onset of numbness or weakness     Wound Care:  Remove bandage on the 2nd day after surgery, then keep your incision open to air. There are white tape strips called steri strips under your bandage. These will fall off on their own. Do not remove them. You may shower on Day 2. Have the stream of water hit you opposite from the incision. Do not scrub the incision. Pat the incision dry after your shower. You cannot take a bath/swim/submerge the incision until 8 weeks after surgery.    Call your doctor or go to the Emergency Room for any signs of infection including: increased  redness, drainage, pain or fever (temperature greater than or equal to 101.4).       Miscellaneous:  -Follow up with Neurosurgery in 2 weeks for wound check      Neurosurgery Office:   [address], [address]  [place] [address]  Telephone [phone]

## 2017-07-06 NOTE — PLAN OF CARE
Pt signed out of PACU by Dr Segovia. Report called to EVA Wolfe. Pt meets criteria for discharge from PACU

## 2017-07-06 NOTE — PLAN OF CARE
Pt arrived to PACU with JENN Brooks RN and DARIANA Marx. Pt sleeping but arouses to voice. vss (see flow sheet) respirations even and unlabored. Dressing to back c/d/i. No signs of discomfort noted. Pt's family updated.

## 2017-07-06 NOTE — ANESTHESIA POSTPROCEDURE EVALUATION
Anesthesia Post Evaluation    Patient: Leonid Delacruz Jr.    Procedure(s) Performed: Procedure(s) (LRB):  VERTEBROPLASTY S1 Vertebroplasty   (N/A)    Final Anesthesia Type: general  Patient location during evaluation: PACU  Patient participation: Yes- Able to Participate  Level of consciousness: awake and alert and oriented  Post-procedure vital signs: reviewed and stable  Pain management: adequate  Airway patency: patent  PONV status at discharge: No PONV  Anesthetic complications: no      Cardiovascular status: blood pressure returned to baseline and hemodynamically stable  Respiratory status: unassisted, spontaneous ventilation and room air  Hydration status: euvolemic  Follow-up not needed.        Visit Vitals  BP (!) 159/85   Pulse 60   Temp 36.8 °C (98.2 °F)   Resp 18   Ht 6' (1.829 m)   Wt 90.7 kg (200 lb)   SpO2 96%   BMI 27.12 kg/m²       Pain/Mary Score: Pain Assessment Performed: Yes (7/6/2017  3:20 PM)  Presence of Pain: denies (7/6/2017  3:20 PM)  Pain Rating Prior to Med Admin: 5 (7/6/2017  9:00 AM)  Mary Score: 9 (7/6/2017  3:20 PM)

## 2017-07-06 NOTE — H&P
[facility]  Neurosurgery  History & Physical    Patient Name: Leonid Delacruz Jr.  MRN: [id number]  Admission Date: 7/6/2017  Attending Physician: Jose J Mayen MD   Primary Care Provider: Sam Parkinson DO    Patient information was obtained from patient and past medical records.     Subjective:     Chief Complaint/Reason for Admission: sacral insufficiency fracture. Low back pain     History of Present Illness:   Leonid Delacruz Jr. is a 82 y.o. male who presents with complaints of low back pain. Patient had a fall on April 6, 2017. He states he was evaluated in the ED 10 days later. He states since the fall, he has been experienced constant, severe low back pain. The pain is located in the midline of the sacrum. It radiates down to the tailbone and up to just above his belt line. The pain does not radiate to the side of his sacrum. He denies pain in his legs. The pain is constant. It is worse with standing and worsens with standing for prolonged periods. It is better with sitting. It will sometimes keep him from sleeping. He has been taking tramadol 50 mg TID prn and tylenol without relief. He has a history of low back pain and states he received injections in the past with pain management. He denies bowel/bladder dysfunction. Since his last office visit, he has been taking Fosamax and vitamin supplementation.       PTA Medications   Medication Sig    albuterol (PROAIR HFA) 90 mcg/actuation inhaler 2 HFA Aerosol Inhaler Inhalation Four times a day p;rn    amlodipine (NORVASC) 5 MG tablet Take 1 tablet (5 mg total) by mouth once daily.    omeprazole (PRILOSEC) 40 MG capsule TAKE 1 CAPSULE BY MOUTH TWICE DAILY BEFORE MEALS    sertraline (ZOLOFT) 100 MG tablet TAKE ONE TABLET BY MOUTH ONCE DAILY.    SYMBICORT 160-4.5 mcg/actuation HFAA INHALE 2 PUFFS INTO LUNGS EVERY 12 HOURS    tamsulosin (FLOMAX) 0.4 mg Cp24 TAKE 1 CAPSULE BY MOUTH ONCE DAILY    tramadol (ULTRAM) 50 mg tablet Take  1-2 tabs PO q8 hrs PRN    aspirin 81 MG Chew Take 81 mg by mouth once daily.    calcium carbonate (OS-JOANA) 500 mg calcium (1,250 mg) tablet Take 1 tablet (500 mg total) by mouth 2 (two) times daily.    cholecalciferol, vitamin D3, 400 unit Cap Take 1 capsule (400 Units total) by mouth 2 (two) times daily.       Review of patient's allergies indicates:  No Known Allergies    Past Medical History:   Diagnosis Date    Amblyopia, strabismic - Left Eye 1/7/2014    Anxiety     AR (allergic rhinitis)     Cataract     COPD (chronic obstructive pulmonary disease)     Gastroesophageal reflux disease with hiatal hernia     History of bleeding peptic ulcer 5/02/2010    History of gastritis 9/22/2009    with hemorrhage    HTN (hypertension), benign 6/14/2017    MDD (major depressive disorder), recurrent episode, moderate 3/3/2016    Osteoarthritis     PUD (peptic ulcer disease)     stomach     Past Surgical History:   Procedure Laterality Date    APPENDECTOMY      CATARACT EXTRACTION W/  INTRAOCULAR LENS IMPLANT  8/20/2001, 10/01/2001    COLONOSCOPY  6/06/2011    ESOPHAGOGASTRODUODENOSCOPY  5/02/2010, 8/11/2010, 3/21/2011, 6/06/2011    LAMINOTOMY  5/20/2003    Left L4-L5    PENILE PROSTHESIS IMPLANT      ROTATOR CUFF REPAIR      Bilateral    TONSILLECTOMY, ADENOIDECTOMY      VASECTOMY       Family History     Problem Relation (Age of Onset)    No Known Problems Sister, Daughter, Son        Social History Main Topics    Smoking status: Never Smoker    Smokeless tobacco: Never Used    Alcohol use 4.2 oz/week     7 Cans of beer per week      Comment: 7-9 beers a week    Drug use: No    Sexual activity: Yes     Partners: Female     Review of Systems   Constitutional: Negative for appetite change, chills, fever and unexpected weight change.   HENT: Negative for congestion, sore throat and trouble swallowing.    Eyes: Negative for visual disturbance.   Respiratory: Negative for cough and shortness of  breath.    Cardiovascular: Negative for chest pain.   Gastrointestinal: Negative for abdominal pain, nausea and vomiting.   Endocrine: Negative for cold intolerance and heat intolerance.   Genitourinary: Negative for difficulty urinating and dysuria.   Musculoskeletal: Positive for back pain.   Neurological: Negative for weakness and numbness.   Hematological: Does not bruise/bleed easily.     Objective:     Weight: 90.7 kg (200 lb)  Body mass index is 27.12 kg/m².  Vital Signs (Most Recent):  Temp: 98.2 °F (36.8 °C) (17)  Pulse: 75 (17)  Resp: 20 (17)  BP: (!) 174/80 (17)  SpO2: 95 % (17) Vital Signs (24h Range):  Temp:  [98.2 °F (36.8 °C)] 98.2 °F (36.8 °C)  Pulse:  [75] 75  Resp:  [20] 20  SpO2:  [95 %] 95 %  BP: (174)/(80) 174/80            Temp (24hrs), Av.2 °F (36.8 °C), Min:98.2 °F (36.8 °C), Max:98.2 °F (36.8 °C)        Physical Exam:    Constitutional: He appears well-developed and well-nourished. No distress.     Eyes: Pupils are equal, round, and reactive to light. EOM are normal.     Cardiovascular: No edema.     Abdominal: Soft.     Psych/Behavior: He is alert. He is oriented to person, place, and time. He has a normal mood and affect.     Musculoskeletal:        Right Upper Extremities: Muscle strength is 5/5.        Left Upper Extremities: Muscle strength is 5/5.       Right Lower Extremities: Muscle strength is 5/5.        Left Lower Extremities: Muscle strength is 5/5.   Moving all extremities equally with good tone     Neurological:   Alert and oriented x4. GCS 15.        Significant Labs:  No results for input(s): GLU, NA, K, CL, CO2, BUN, CREATININE, CALCIUM, MG in the last 48 hours.  No results for input(s): WBC, HGB, HCT, PLT in the last 48 hours.  No results for input(s): LABPT, INR, APTT in the last 48 hours.  Microbiology Results (last 7 days)     ** No results found for the last 168 hours. **            Significant Diagnostics:  MRI  Lumbar Spine  There are 5 lumbar vertebrae.  Postoperative changes noted at L4-L5 and L5-S1 consistent with prior decompression.  There is compression fracture of L1 with severe height loss, unchanged.  Remaining vertebral body heights are maintained.  There is grade 1 anterolisthesis of L4-L5 and L5-S1.  There is a nondisplaced fracture involving the S2 sacral segment and left sacral ala, demonstrating mild angulation.  No evidence for marrow infiltrative process.  Disc heights are maintained, noting multilevel disc desiccation. Conus terminates at T12-L1 and appears unremarkable. Note made of renal cysts.  Penile prosthesis reservoir noted anterior to the urinary bladder.  Paraspinal musculature is moderately atrophied.  Evaluation of sacroiliac joints is unremarkable.    T12-L1: Mild retropulsion of L1 vertebral body effaces the ventral thecal sac. No spinal canal stenosis or neuroforaminal narrowing.    L1-L2: No spinal canal stenosis or neuroforaminal narrowing.    L2-L3: Mild bilateral facet arthropathy noted.  No spinal canal stenosis or neuroforaminal narrowing.    L3-L4: Mild bilateral facet arthropathy noted.  No spinal canal stenosis or neuroforaminal narrowing.    L4-L5: Status post left laminectomy.  Circumferential disc bulge and moderate to severe bilateral facet arthropathy result in effacement of the dorsal thecal sac and mild right, moderate left neuroforaminal narrowing.    L5-S1: Status post bilateral laminectomy.  Uncovering of the intervertebral disc along with disc bulge and severe bilateral facet arthropathy noted.  No spinal canal stenosis or neuroforaminal narrowing.   Impression    1.  Acute fracture of the S2 vertebral body and left sacral ala, likely insufficiency.  Recommend followup MRI pelvis in 3 months to ensure improvement in marrow signal and absence of underlying neoplasm.  2.  Degenerative and postoperative changes of lumbar spine as detailed above.         Assessment/Plan:      Active Diagnoses:    Diagnosis Date Noted POA    Vertebral fracture, osteoporotic [M80.88XA] 07/06/2017 Yes      Problems Resolved During this Admission:    Diagnosis Date Noted Date Resolved POA     83 yo male with osteoporosis, sacral fracture, chronic L1 compression fracture, chronic low back pain. Dr. Mayen explained the natural history of the disease and all treatment options. He recommended S1 vertebroplasty. Dr. Mayen and the patient have discussed the risks and benefits. Patient understands the risks and would like to proceed with surgery.      Kandy Black PA-C  Neurosurgery  [facility]

## 2017-07-06 NOTE — ANESTHESIA PROCEDURE NOTES
Arterial    Diagnosis: Sacral insuffiency fracture  Doctor requesting consult: Faheem    Patient location during procedure: done in OR  Procedure start time: 7/6/2017 1:16 PM  Timeout: 7/6/2017 1:16 PM  Procedure end time: 7/6/2017 1:22 PM  Staffing  Anesthesiologist: МАРИНА CAVANAUGH  Resident/CRNA: AMADOR ARTHUR  Performed: resident/CRNA   Anesthesiologist was present at the time of the procedure.  Preanesthetic Checklist  Completed: patient identified, site marked, surgical consent, pre-op evaluation, timeout performed, IV checked, risks and benefits discussed, monitors and equipment checked and anesthesia consent givenArterial  Skin Prep: chlorhexidine gluconate  Local Infiltration: none  Orientation: left  Location: radial  Catheter Size: 20 G  Catheter placement by Anatomical landmarks. Heme positive aspiration all ports.Insertion Attempts: 2  Assessment  Dressing: secured with tape and tegaderm  Patient: Tolerated well

## 2017-07-06 NOTE — OP NOTE
DATE OF PROCEDURE: 07/06/2017     PREOPERATIVE DIAGNOSES:  1. Bilateral sacral insufficiency fractures and S1 compression fracture  2. Refractory severe sacral pain        POSTOPERATIVE DIAGNOSES:  1. Bilateral sacral insufficiency fractures and S1 compression fracture  2. Refractory severe sacral pain     PROCEDURES:     1. Bilateral percutaneous S1 vertebroplasty using low viscosity PMMA .  2. Fluoroscopy.        PRIMARY SURGEON: Jose J Mayen M.D.     ASSISTANT: NATHAN     INDICATIONS: This is an 82-year-old male with severe osteoporosis with bilateral sacral insufficiency fractures and S1 compression fracture. His sacral pain has been refractory for more than 2 months.The risks of the procedure include hemorrhage, infection, paralysis, loss of sensation, loss of sphincter functions, death and postoperative medical  complication.        DESCRIPTION OF THE PROCEDURE: The patient was intubated under general   anesthesia. He was placed prone on the Alber table. All pressure points   were carefully padded. Fluoroscopic localization was done to identify the alar line in the lateral projection, the SI joints in the AP projection as well as the S1 to S3 foramen. Using fluoroscopy, the   sacrum was cannulated with Jamshidi needles bilaterally using the long axis technique. The needle was inserted laterally to the S3 sacral foramen and once the   tip of the needle was advanced just posterior to the alar line, we injected low viscosity PMMA, about 6-7 CC on each side. We pulled the needle for about 5 mm after each 1 mm injection to cover the whole sacral ala and S1 vertebral body bilaterally. We then removed the needles. The cutaneous layer was closed with 4-0 Monocryl and we applied steri-strips. The wounds were then dressed. The final count was completed and nothing was missing. There was no complication. The patient tolerated well the procedures. Blood loss: 1 cc

## 2017-07-10 RX ORDER — TAMSULOSIN HYDROCHLORIDE 0.4 MG/1
CAPSULE ORAL
Qty: 90 CAPSULE | Refills: 3 | Status: SHIPPED | OUTPATIENT
Start: 2017-07-10 | End: 2018-07-10 | Stop reason: SDUPTHER

## 2017-07-17 ENCOUNTER — NURSE TRIAGE (OUTPATIENT)
Dept: ADMINISTRATIVE | Facility: CLINIC | Age: 82
End: 2017-07-17

## 2017-07-17 NOTE — TELEPHONE ENCOUNTER
"  Reason for Disposition   [1] Dry mouth AND [2] new onset AND [3] unexplained(Exceptions: recurrent ongoing problem or dry mouth from mild dehydration)    Answer Assessment - Initial Assessment Questions  1. SYMPTOM: "What's the main symptom you're concerned about?" (e.g., dry mouth. chapped lips, lump)      Everything taste bitter  2. ONSET: "When did the  ________  start?"      unsure  3. PAIN: "Is there any pain?" If so, ask: "How bad is it?" (Scale: 1-10; mild, moderate, severe)      -  4. CAUSE: "What do you think is causing the symptoms?"      unsure  5. OTHER SYMPTOMS: "Do you have any other symptoms?" (e.g., fever, sore throat, toothache, swelling)      no  6. PREGNANCY: "Is there any chance you are pregnant?" "When was your last menstrual period?"      -    Protocols used: ST MOUTH SYMPTOMS-A-AH    "

## 2017-07-19 ENCOUNTER — TELEPHONE (OUTPATIENT)
Dept: INTERNAL MEDICINE | Facility: CLINIC | Age: 82
End: 2017-07-19

## 2017-07-20 ENCOUNTER — HOSPITAL ENCOUNTER (OUTPATIENT)
Dept: RADIOLOGY | Facility: HOSPITAL | Age: 82
Discharge: HOME | End: 2017-07-20
Attending: NEUROLOGICAL SURGERY
Payer: MEDICARE

## 2017-07-20 ENCOUNTER — OFFICE VISIT (OUTPATIENT)
Dept: NEUROSURGERY | Facility: CLINIC | Age: 82
End: 2017-07-20
Payer: MEDICARE

## 2017-07-20 VITALS
WEIGHT: 196 LBS | BODY MASS INDEX: 26.58 KG/M2 | SYSTOLIC BLOOD PRESSURE: 130 MMHG | HEART RATE: 93 BPM | DIASTOLIC BLOOD PRESSURE: 69 MMHG

## 2017-07-20 DIAGNOSIS — S32.591A PUBIC RAMUS FRACTURE, RIGHT, CLOSED, INITIAL ENCOUNTER: ICD-10-CM

## 2017-07-20 DIAGNOSIS — Z98.890 S/P VERTEBROPLASTY: Primary | ICD-10-CM

## 2017-07-20 DIAGNOSIS — Z98.890 S/P VERTEBROPLASTY: ICD-10-CM

## 2017-07-20 PROCEDURE — 99999 PR PBB SHADOW E&M-EST. PATIENT-LVL IV: CPT | Mod: PBBFAC,,, | Performed by: PHYSICIAN ASSISTANT

## 2017-07-20 PROCEDURE — 72220 X-RAY EXAM SACRUM TAILBONE: CPT | Mod: TC

## 2017-07-20 PROCEDURE — 72220 X-RAY EXAM SACRUM TAILBONE: CPT | Mod: 26,,, | Performed by: RADIOLOGY

## 2017-07-20 PROCEDURE — 99024 POSTOP FOLLOW-UP VISIT: CPT | Mod: S$GLB,,, | Performed by: PHYSICIAN ASSISTANT

## 2017-07-20 NOTE — PROGRESS NOTES
[facility] - Neurosurgery  Progress Note      SUBJECTIVE:     Chief Complaint/Reason for Visit: 2 week post op follow up     History of Present Illness:  Leonid Delacruz Jr. is a 82 y.o. male who is 2 weeks status post S1 vertebroplasty for bilateral sacral insufficiency fractures and S1 compression fracture. He reports he is doing well since his procedure with resolution of his sacral pain. He continues to report pain at his belt line. It is elicited by prolonged standing and walking. He has been compliant with taking Fosamax and vitamin supplementation. He is not interested in physical therapy. He takes tramadol and tylenol for the pain.     Low Back Pain Scale  R Low Back-Pain Score: 6  R Low Back-Pain Intensity: Pain killers give very little relief from pain  R Low Back-Pain Score: I can look after myself normally but it causes extra pain  Low Back-Lifting: I can only lift very light weights   Low Back-Walking: Pain prevents me walking more than .5 mile   Low Back-Sitting: I can sit in any chair as long as I like   Low Back-Standing: I cannot stand for longer than 10 minutes with increasing pain   Low Back-Sleeping: I have pain in bed but it does not prevent me from sleeping well   Low Back-Social Life: Pain has restricted my social life and I do not go out very often   Low Back-Traveling: I have extra pain while traveling which compels me to seek alternate forms of travel   Low Back-Changing Degree of Pain: My pain is gradually worsening       Review of patient's allergies indicates:  No Known Allergies      OBJECTIVE:     Vital Signs (Most Recent):  Pulse: 93 (07/20/17 1055)  BP: 130/69 (07/20/17 1055)    Physical Exam:  General: well developed, well nourished, no distress  Neurologic: Alert and oriented. Thought content appropriate.   GCS: Motor: 6/Verbal: 5/Eyes: 4 GCS Total: 15   Mental Status: Awake, Alert, Oriented x3   Cranial nerves: face symmetric, tongue midline, pupils equal, round, reactive to light,  EOMI.   Motor Strength: moves all extremities with good strength and tone   Sensation: response to light touch throughout  Wound is clean, dry and intact with no signs of erythema, swelling or purulent drainage. Healing well       Diagnostic Results:  I have independently reviewed the following imaging and agree with findings:  Xray Sacrum: There is postoperative change with bone cement identified involving the sacrum near the SI joints bilaterally.  There is a mildly displaced comminuted fracture of the superior and inferior pubic ramus adjacent to the symphysis on the right with probable mildly displaced fracture of the superior pubic ramus more posteriorly near the acetabulum.    ASSESSMENT/PLAN:     82 y.o. male 2 weeks s/p S1 vertebroplasty with resolution of sacral pain and improvement in low back pain. New pelvic fracture seen on today's xray     - Follow up in 4 weeks with Dr. Mayen and repeat xray  - Referral to ortho for pelvic fracture   - No lifting more than 10 lbs or excessive bending/twisting.   - Encouraged patient to call if they have any questions or concerns prior to next follow up appt.    Kandy Black PA-C  Neurosurgery

## 2017-08-14 ENCOUNTER — OFFICE VISIT (OUTPATIENT)
Dept: NEUROSURGERY | Facility: CLINIC | Age: 82
End: 2017-08-14
Payer: MEDICARE

## 2017-08-14 VITALS
HEART RATE: 97 BPM | SYSTOLIC BLOOD PRESSURE: 137 MMHG | WEIGHT: 202.81 LBS | BODY MASS INDEX: 27.51 KG/M2 | DIASTOLIC BLOOD PRESSURE: 72 MMHG

## 2017-08-14 DIAGNOSIS — M47.816 ARTHROPATHY OF LUMBAR FACET JOINT: ICD-10-CM

## 2017-08-14 DIAGNOSIS — M43.17 ACQUIRED SPONDYLOLISTHESIS OF LUMBOSACRAL REGION: Primary | ICD-10-CM

## 2017-08-14 PROCEDURE — 99024 POSTOP FOLLOW-UP VISIT: CPT | Mod: S$GLB,,, | Performed by: NEUROLOGICAL SURGERY

## 2017-08-14 PROCEDURE — 99499 UNLISTED E&M SERVICE: CPT | Mod: S$GLB,,, | Performed by: NEUROLOGICAL SURGERY

## 2017-08-14 PROCEDURE — 99999 PR PBB SHADOW E&M-EST. PATIENT-LVL III: CPT | Mod: PBBFAC,,, | Performed by: NEUROLOGICAL SURGERY

## 2017-08-14 RX ORDER — CELECOXIB 200 MG/1
200 CAPSULE ORAL 2 TIMES DAILY
Qty: 60 CAPSULE | Refills: 2 | Status: SHIPPED | OUTPATIENT
Start: 2017-08-14 | End: 2017-09-28 | Stop reason: SDUPTHER

## 2017-08-14 NOTE — PROGRESS NOTES
NEUROSURGICAL POST-OPERATIVE PROGRESS NOTE    DATE OF SERVICE:  08/14/2017      ATTENDING PHYSICIAN:  Jose J Mayen MD    SUBJECTIVE:    INTERIM HISTORY:    This is a very pleasant 82 y.o. y.o. male, who is status 6 weeks sacroplasty. His sacral pain has completely resolved but now he is complaining of low back pain with activity. Denies having leg pain. He would like to be able to be more functional. He has difficulty mobilizing because of the low back pain. Leaning forward, bending lifting increases his pain.     Low Back Pain Scale  R Low Back-Pain Score: 8  R Low Back-Pain Intensity: Pain killers give very little relief from pain  R Low Back-Pain Score: I can look after myself normally but it causes extra pain  Low Back-Lifting: I can only lift very light weights   Low Back-Walking: Pain prevents me walking more than .25 mile   Low Back-Sitting: I can sit in any chair as long as I like   Low Back-Standing: I cannot stand for longer than 10 minutes with increasing pain   Low Back-Sleeping: I have no pain in bed   Low Back-Social Life: My social life is normal but it increases the degree of pain   Low Back-Traveling: Pain restricts me to short necessary journeys under 30 minutes   Low Back-Changing Degree of Pain: My pain seems to be getting better but improvement is slow         OBJECTIVE:    PHYSICAL EXAMINATION:     Neurosurgery Physical Exam    Back Exam     Tenderness   The patient is experiencing tenderness in the lumbar.            Neurologic Exam    Wound has healed.    DIAGNOSTIC DATA:    Lumbar spine MRI 05/10/2017: lumbarization of S1, severe facet arthropathy at L5-S1 and L4-5 with degenerative spondylolisthesis    ASSESMENT:    This is a 82 y.o. male who is s/p sacroplasty with complete resolution of his sacral pain. Now complains of mechanical low back pain that is most likely secondary to his L3-4, L4-5 degenerative spondylolisthesis and facet arthropathy.     PLAN:    Referral in pain management  for bilateral L3-4, L4-5 and L5-S1 medial branch RFA  Would be a candidate for SCS trial if the RFA fails to help him.       Jose J Mayen MD  Pager: [phone]

## 2017-08-16 RX ORDER — BUDESONIDE AND FORMOTEROL FUMARATE DIHYDRATE 160; 4.5 UG/1; UG/1
AEROSOL RESPIRATORY (INHALATION)
Qty: 10.2 G | Refills: 0 | Status: SHIPPED | OUTPATIENT
Start: 2017-08-16 | End: 2018-01-22 | Stop reason: SDUPTHER

## 2017-08-21 ENCOUNTER — OFFICE VISIT (OUTPATIENT)
Dept: PAIN MEDICINE | Facility: CLINIC | Age: 82
End: 2017-08-21
Payer: MEDICARE

## 2017-08-21 ENCOUNTER — TELEPHONE (OUTPATIENT)
Dept: PAIN MEDICINE | Facility: CLINIC | Age: 82
End: 2017-08-21

## 2017-08-21 VITALS
WEIGHT: 200.63 LBS | HEART RATE: 72 BPM | DIASTOLIC BLOOD PRESSURE: 70 MMHG | SYSTOLIC BLOOD PRESSURE: 120 MMHG | BODY MASS INDEX: 27.21 KG/M2

## 2017-08-21 DIAGNOSIS — M84.48XD SACRAL INSUFFICIENCY FRACTURE, WITH ROUTINE HEALING, SUBSEQUENT ENCOUNTER: ICD-10-CM

## 2017-08-21 DIAGNOSIS — M47.816 LUMBAR FACET ARTHROPATHY: Primary | ICD-10-CM

## 2017-08-21 PROCEDURE — 1125F AMNT PAIN NOTED PAIN PRSNT: CPT | Mod: S$GLB,,, | Performed by: ANESTHESIOLOGY

## 2017-08-21 PROCEDURE — 99213 OFFICE O/P EST LOW 20 MIN: CPT | Mod: S$GLB,,, | Performed by: ANESTHESIOLOGY

## 2017-08-21 PROCEDURE — 1159F MED LIST DOCD IN RCRD: CPT | Mod: S$GLB,,, | Performed by: ANESTHESIOLOGY

## 2017-08-21 PROCEDURE — 3008F BODY MASS INDEX DOCD: CPT | Mod: S$GLB,,, | Performed by: ANESTHESIOLOGY

## 2017-08-21 PROCEDURE — 3074F SYST BP LT 130 MM HG: CPT | Mod: S$GLB,,, | Performed by: ANESTHESIOLOGY

## 2017-08-21 PROCEDURE — 99499 UNLISTED E&M SERVICE: CPT | Mod: S$GLB,,, | Performed by: ANESTHESIOLOGY

## 2017-08-21 PROCEDURE — 1157F ADVNC CARE PLAN IN RCRD: CPT | Mod: S$GLB,,, | Performed by: ANESTHESIOLOGY

## 2017-08-21 PROCEDURE — 99999 PR PBB SHADOW E&M-EST. PATIENT-LVL III: CPT | Mod: PBBFAC,,, | Performed by: ANESTHESIOLOGY

## 2017-08-21 PROCEDURE — 3078F DIAST BP <80 MM HG: CPT | Mod: S$GLB,,, | Performed by: ANESTHESIOLOGY

## 2017-08-21 RX ORDER — ALENDRONATE SODIUM 10 MG/1
10 TABLET ORAL DAILY
COMMUNITY
Start: 2017-08-17

## 2017-08-21 NOTE — LETTER
August 21, 2017      Jose J Mayen MD  [address]  [address]  [facility] [address]           [facility]  [address]  [facility] [address]  Phone: [phone]          Patient: Leonid Delacruz Jr.   MR Number: [id number]   YOB: 1934   Date of Visit: 8/21/2017       Dear Dr. Jose J Mayen:    Thank you for referring Leonid Delacruz to me for evaluation. Attached you will find relevant portions of my assessment and plan of care.    If you have questions, please do not hesitate to call me. I look forward to following Leonid Delacruz along with you.    Sincerely,    Jamilah Haas MD    Enclosure  CC:  No Recipients    If you would like to receive this communication electronically, please contact [email or web address] or [phone] to request more information on [employer] access.    For providers and/or their staff who would like to refer a patient to [facility], please contact us through our one-stop-shop provider referral line, [facility], at [phone].    If you feel you have received this communication in error or would no longer like to receive these types of communications, please e-mail [email or web address]

## 2017-08-21 NOTE — PROGRESS NOTES
Chronic Pain - New Consult    Referring Physician: Jose J Mayen MD    Chief Complaint: No chief complaint on file.       SUBJECTIVE:    Leonid Delacruz Jr. presents to the clinic for the evaluation of back pain. The pain started *** ago following *** and symptoms have been {IUW:76223}.The pain is located in the *** area and radiates to the ***.  The pain is described as {Desc; pain character:49073} and is rated as {GEN PAIN SCALE HI:09810}. The pain is rated with a score of  {GEN PAIN SCALE HI:55983} on the BEST day and a score of {GEN PAIN SCALE HI:38263} on the WORST day.  Symptoms interfere with {INTERFERE:38189}. The pain is exacerbated by {Causes; Pain:48786}.  The pain is mitigated by {MITIGATIN}. He reports spending *** hours per day reclining. The patient reports *** hours of uninterrupted sleep per night.    Patient denies night fever/night sweats, urinary incontinence, bowel incontinence, significant weight loss, significant motor weakness and loss of sensations.    Physical Therapy/Home Exercise: {YES/NO:63}      Pain Disability Index Review:  Last 3 PDI Scores 2017   Pain Disability Index (PDI) 60 65 42       Pain Medications:    - Opioids: {Opioids:94347}  - Adjuvant Medications: {Adjuvant Medications:60427}  - Anti-Coagulants: {Anti-coagulants:23024}  - Others: see medications list     report:  Reviewed and consistent with medication use as prescribed.    Pain Procedures: ***    Imaging: ***    Past Medical History:   Diagnosis Date    Amblyopia, strabismic - Left Eye 2014    Anxiety     AR (allergic rhinitis)     Cataract     COPD (chronic obstructive pulmonary disease)     Gastroesophageal reflux disease with hiatal hernia     History of bleeding peptic ulcer 2010    History of gastritis 2009    with hemorrhage    HTN (hypertension), benign 2017    MDD (major depressive disorder), recurrent episode, moderate 3/3/2016     Osteoarthritis     PUD (peptic ulcer disease)     stomach     Past Surgical History:   Procedure Laterality Date    APPENDECTOMY      CATARACT EXTRACTION W/  INTRAOCULAR LENS IMPLANT  8/20/2001, 10/01/2001    COLONOSCOPY  6/06/2011    ESOPHAGOGASTRODUODENOSCOPY  5/02/2010, 8/11/2010, 3/21/2011, 6/06/2011    LAMINOTOMY  5/20/2003    Left L4-L5    PENILE PROSTHESIS IMPLANT      ROTATOR CUFF REPAIR      Bilateral    TONSILLECTOMY, ADENOIDECTOMY      VASECTOMY       Social History     Social History    Marital status:      Spouse name: Ting    Number of children: 2    Years of education: N/A     Occupational History    Retired      Social History Main Topics    Smoking status: Never Smoker    Smokeless tobacco: Never Used    Alcohol use 4.2 oz/week     7 Cans of beer per week      Comment: 7-9 beers a week    Drug use: No    Sexual activity: Yes     Partners: Female     Other Topics Concern    Not on file     Social History Narrative    No narrative on file     Family History   Problem Relation Age of Onset    No Known Problems Sister     No Known Problems Daughter     No Known Problems Son     Amblyopia Neg Hx     Blindness Neg Hx     Cancer Neg Hx     Cataracts Neg Hx     Diabetes Neg Hx     Glaucoma Neg Hx     Hypertension Neg Hx     Macular degeneration Neg Hx     Retinal detachment Neg Hx     Strabismus Neg Hx     Stroke Neg Hx     Thyroid disease Neg Hx     Heart disease Neg Hx        Review of patient's allergies indicates:  No Known Allergies    Current Outpatient Prescriptions   Medication Sig    albuterol (PROAIR HFA) 90 mcg/actuation inhaler 2 HFA Aerosol Inhaler Inhalation Four times a day p;rn    amlodipine (NORVASC) 5 MG tablet Take 1 tablet (5 mg total) by mouth once daily.    aspirin 81 MG Chew Take 81 mg by mouth once daily.    calcium carbonate (OS-JOANA) 500 mg calcium (1,250 mg) tablet Take 1 tablet (500 mg total) by mouth 2 (two) times daily.     celecoxib (CELEBREX) 200 MG capsule Take 1 capsule (200 mg total) by mouth 2 (two) times daily.    cholecalciferol, vitamin D3, 400 unit Cap Take 1 capsule (400 Units total) by mouth 2 (two) times daily.    omeprazole (PRILOSEC) 40 MG capsule TAKE 1 CAPSULE BY MOUTH TWICE DAILY BEFORE MEALS    sertraline (ZOLOFT) 100 MG tablet TAKE ONE TABLET BY MOUTH ONCE DAILY.    SYMBICORT 160-4.5 mcg/actuation HFAA INHALE 2 PUFFS INTO LUNGS EVERY 12 HOURS    SYMBICORT 160-4.5 mcg/actuation HFAA INHALE 2 PUFFS BY MOUTH TWICE DAILY    tamsulosin (FLOMAX) 0.4 mg Cp24 TAKE 1 CAPSULE BY MOUTH DAILY    tramadol (ULTRAM) 50 mg tablet Take 1-2 tabs PO q6 hrs PRN     No current facility-administered medications for this visit.        REVIEW OF SYSTEMS:    GENERAL:  No weight loss, malaise or fevers.  HEENT:  Negative for frequent or significant headaches.  NECK:  Negative for lumps, goiter, pain and significant neck swelling.  RESPIRATORY:  Negative for cough, wheezing or shortness of breath.  CARDIOVASCULAR:  Negative for chest pain, leg swelling or palpitations.  GI:  Negative for abdominal discomfort, blood in stools or black stools or change in bowel habits.  MUSCULOSKELETAL:  See HPI.  SKIN:  Negative for lesions, rash, and itching.  PSYCH:  Negative for sleep disturbance, mood disorder and recent psychosocial stressors.  HEMATOLOGY/LYMPHOLOGY:  Negative for prolonged bleeding, bruising easily or swollen nodes.  NEURO:   No history of headaches, syncope, paralysis, seizures or tremors.  All other reviewed and negative other than HPI.    OBJECTIVE:    There were no vitals taken for this visit.    PHYSICAL EXAMINATION:    General appearance: Well appearing, in no acute distress, alert and oriented x3.  Psych:  Mood and affect appropriate.  Skin: Skin color, texture, turgor normal, no rashes or lesions, in both upper and lower body.  Head/face:  Normocephalic, atraumatic. No palpable lymph nodes.  Neck: No pain to palpation over  the cervical paraspinous muscles. Spurling Negative. No pain with neck flexion, extension, or lateral flexion.   Cor: RRR  Pulm: CTA  GI:  Soft and non-tender.  Back: Straight leg raising in the sitting and supine positions is negative to radicular pain. No pain to palpation over the spine or costovertebral angles. Normal range of motion without pain reproduction.  Extremities: Peripheral joint ROM is full and pain free without obvious instability or laxity in all four extremities. No deformities, edema, or skin discoloration. Good capillary refill.  Musculoskeletal: Shoulder, hip, sacroiliac and knee provocative maneuvers are negative. Bilateral upper and lower extremity strength is normal and symmetric.  No atrophy or tone abnormalities are noted.  Neuro: Bilateral upper and lower extremity coordination and muscle stretch reflexes are physiologic and symmetric.  Plantar response are downgoing. No loss of sensation is noted.  Gait: normal.    ASSESSMENT: 82 y.o. year old male with *** pain, consistent with ***     No diagnosis found.      PLAN:     {PLAN:54069}  - RTC ***  - Counseled patient regarding the importance of {:41008}.    The above plan and management options were discussed at length with patient. Patient is in agreement with the above and verbalized understanding. It will be communicated with the referring physician via electronic record, fax, or mail.    Prachi Zamorano  08/21/2017

## 2017-08-21 NOTE — PROGRESS NOTES
Chronic patient Established Note (Follow up visit)      SUBJECTIVE:    Leonid Delacruz Jr. presents to the clinic for a follow-up appointment for back. Since the last visit, Leonid Delacruz Jr. states the pain has been worsening. Current pain intensity is 8/10.  He had sacral insufficiency fracture sp sacroplasty by  6 weeks ago with complete relief of his sacral pain. Now he complains of axial low back pain.   He has hx of lamninectomy and removal of synovial cyst     Pain Disability Index Review:  Last 3 PDI Scores 8/21/2017 5/11/2017 5/1/2017   Pain Disability Index (PDI) 56 60 65       Pain Medications:    - Opioids: Ultram (Tramadol HCL)  - Adjuvant Medications: Celebrex  - Anti-Coagulants:None  - Others: See Medication List    Opioid Contract: no     report:  Reviewed and consistent with medication use as prescribed.    Pain Procedures:  Injections with Dr. Constantino including MBB and MB RFA  Facet joints injections by IR     Physical Therapy/Home Exercise: no    Imaging: X-Ray Sacrum And Coccyx 7/20/17  Narrative     AP pelvis sacrum and coccyx 3 views.  Comparison 1040 HR study same day.  Fracture deformity right central acetabulum, comminuted fracture right symphysis pubis, post penile therapy prosthesis surgery, vertebral plasty S1-S4 levels stable.  Advanced facet joint arthropathy, primarily anteriorly subluxation L4 on L5, L5 on S1 unchanged.   Impression      See results above.      Electronically signed by: JAYLAN BONILLA MD  Date: 07/20/17  Time: 13:14      X-Ray Sacrum And Coccyx 7/20/17  Narrative     Sacrum and coccyx.    Findings: 3 views.  There is postoperative change with bone cement identified involving the sacrum near the SI joints bilaterally.  There is a mildly displaced comminuted fracture of the superior and inferior pubic ramus adjacent to the symphysis on the right with probable mildly displaced fracture of the superior pubic ramus more posteriorly near the acetabulum.    Impression      As above.      Electronically signed by: YOLANDE VILA MD  Date: 07/20/17  Time: 10:50           MRI Lumbar Spine Without Contrast 5/10/17  Narrative   MRI LUMBAR SPINE    TECHNIQUE: MRI lumbar spine was performed without contrast. The following sequences were obtained: Localizer; sagittal T1, T2 CUBE, STIR; axial T1 and T2.    COMPARISON: 09/25/13    FINDINGS:    There are 5 lumbar vertebrae.  Postoperative changes noted at L4-L5 and L5-S1 consistent with prior decompression.  There is compression fracture of L1 with severe height loss, unchanged.  Remaining vertebral body heights are maintained.  There is grade 1 anterolisthesis of L4-L5 and L5-S1.  There is a nondisplaced fracture involving the S2 sacral segment and left sacral ala, demonstrating mild angulation.  No evidence for marrow infiltrative process.  Disc heights are maintained, noting multilevel disc desiccation. Conus terminates at T12-L1 and appears unremarkable. Note made of renal cysts.  Penile prosthesis reservoir noted anterior to the urinary bladder.  Paraspinal musculature is moderately atrophied.  Evaluation of sacroiliac joints is unremarkable.    T12-L1: Mild retropulsion of L1 vertebral body effaces the ventral thecal sac. No spinal canal stenosis or neuroforaminal narrowing.    L1-L2: No spinal canal stenosis or neuroforaminal narrowing.    L2-L3: Mild bilateral facet arthropathy noted.  No spinal canal stenosis or neuroforaminal narrowing.    L3-L4: Mild bilateral facet arthropathy noted.  No spinal canal stenosis or neuroforaminal narrowing.    L4-L5: Status post left laminectomy.  Circumferential disc bulge and moderate to severe bilateral facet arthropathy result in effacement of the dorsal thecal sac and mild right, moderate left neuroforaminal narrowing.    L5-S1: Status post bilateral laminectomy.  Uncovering of the intervertebral disc along with disc bulge and severe bilateral facet arthropathy noted.  No spinal canal  stenosis or neuroforaminal narrowing.   Impression      1.  Acute fracture of the S2 vertebral body and left sacral ala, likely insufficiency.  Recommend followup MRI pelvis in 3 months to ensure improvement in marrow signal and absence of underlying neoplasm.  2.  Degenerative and postoperative changes of lumbar spine as detailed above.      Electronically signed by: BRIDGETTE SUAREZ MD  Date: 05/10/17  Time: 08:57       4/19/17 X-Ray Sacrum And Coccyx   Narrative   Sacrum and coccyx.  Degenerative change of the hips.  Postop changes noted.  No acute fracture or bone destruction evident.  Degenerative change in lumbar spine.   Impression    See above          Xray Lumbar Spine 3/3/16      lumbar spine with obliques compared to September 2013.  Bones are demineralized.  Moderate compression fracture involving the L1 vertebral body unchanged.  There is facet arthropathy particularly at the lower lumbar levels with a grade one antral listhesis of L5 on S1.  No convincing spondylolysis.  Calcified plaque in the aorta.    Impression compression fracture and degenerative changes as above.  ______________________________________     Electronically signed by: BRITNEY CASEY MD  Date: 03/03/16  Time: 10:39     Encounter   View Encounter          MRI Lumbar Spine 9/25/16  MRI LUMBAR SPINE    TECHNIQUE: MRI lumbar spine was performed without contrast on a 1.5T magnet. The following sequences were obtained: Localizer; sagittal T1, T2, STIR; axial T1 and T2.    COMPARISON: 1/31/2012    FINDINGS:    There are 5 lumbar vertebrae.  There is mild (grade 1) anterolisthesis of L5 on S1.  There is a chronic compression fracture of the L1 vertebral body with greater than 50% loss of vertebral body height.  There is an associated marrow edema.  Vertebral   body heights are preserved the remaining levels.  There is multilevel disk desiccation.  Disk heights are relatively well preserved. Conus terminates at L1 and appears unremarkable.  Limited evaluation of posterior abdominal structures is unremarkable.    Paraspinal musculature is markedly atrophied below the level of S1 on the left.  Evaluation of sacroiliac joints is unremarkable.    L1-L2: No spinal canal stenosis or neuroforaminal narrowing.    L2-L3: Mild diffuse disk bulge without spinal canal or neuroforaminal stenosis    L3-L4: Mild bilateral facet hypertrophy without spinal canal or neuroforaminal stenosis.    L4-L5: Severe bilateral facet arthropathy, greater on the right and mild uncovering of the intervertebral disk results in mild bilateral neural foraminal stenosis.  There is been prior bilateral laminectomy.    L5-S1: Severe bilateral facet arthropathy and an uncovering of the intervertebral disk with mild neuroforaminal narrowing.  No spinal canal stenosis.   Impression        Severe facet joint arthropathy at L4-5 and L5-S1, similar to the 1/31/12 exam. This contributes to mild bilateral neuroforaminal narrowing.  No significant central canal stenosis.   Mild anterolisthesis of L4 on L5 and L5 on S1 noted, unchanged.  ______________________________________     Electronically signed by resident: EMELIA ARAGON MD  Date: 09/25/13  Time: 17:05          As the supervising and teaching physician, I personally reviewed the images and resident's interpretation and I agree with the findings.              Allergies: Review of patient's allergies indicates:  No Known Allergies    Current Medications:   Current Outpatient Prescriptions   Medication Sig Dispense Refill    albuterol (PROAIR HFA) 90 mcg/actuation inhaler 2 HFA Aerosol Inhaler Inhalation Four times a day p;rn 3 Inhaler 3    alendronate (FOSAMAX) 10 MG Tab       amlodipine (NORVASC) 5 MG tablet Take 1 tablet (5 mg total) by mouth once daily. 30 tablet 11    aspirin 81 MG Chew Take 81 mg by mouth once daily.      calcium carbonate (OS-JOANA) 500 mg calcium (1,250 mg) tablet Take 1 tablet (500 mg total) by mouth 2 (two) times  daily.  0    celecoxib (CELEBREX) 200 MG capsule Take 1 capsule (200 mg total) by mouth 2 (two) times daily. 60 capsule 2    cholecalciferol, vitamin D3, 400 unit Cap Take 1 capsule (400 Units total) by mouth 2 (two) times daily.  0    omeprazole (PRILOSEC) 40 MG capsule TAKE 1 CAPSULE BY MOUTH TWICE DAILY BEFORE MEALS 60 capsule 5    SYMBICORT 160-4.5 mcg/actuation HFAA INHALE 2 PUFFS INTO LUNGS EVERY 12 HOURS 10.2 g 0    tamsulosin (FLOMAX) 0.4 mg Cp24 TAKE 1 CAPSULE BY MOUTH DAILY 90 capsule 3    tramadol (ULTRAM) 50 mg tablet Take 1-2 tabs PO q6 hrs PRN 60 tablet 0    sertraline (ZOLOFT) 100 MG tablet TAKE ONE TABLET BY MOUTH ONCE DAILY. 90 tablet 3    SYMBICORT 160-4.5 mcg/actuation HFAA INHALE 2 PUFFS BY MOUTH TWICE DAILY 10.2 g 0     No current facility-administered medications for this visit.        REVIEW OF SYSTEMS:    GENERAL:  No weight loss, malaise or fevers.  HEENT:  Negative for frequent or significant headaches.  NECK:  Negative for lumps, goiter, pain and significant neck swelling.  RESPIRATORY:  Negative for cough, wheezing or shortness of breath.  CARDIOVASCULAR:  Negative for chest pain, leg swelling or palpitations.  GI:  Negative for abdominal discomfort, blood in stools or black stools or change in bowel habits.  MUSCULOSKELETAL:  See HPI.  SKIN:  Negative for lesions, rash, and itching.  PSYCH:  Negative for sleep disturbance, mood disorder and recent psychosocial stressors.  HEMATOLOGY/LYMPHOLOGY:  Negative for prolonged bleeding, bruising easily or swollen nodes.  NEURO:   No history of headaches, syncope, paralysis, seizures or tremors.  All other reviewed and negative other than HPI.    Past Medical History:  Past Medical History:   Diagnosis Date    Amblyopia, strabismic - Left Eye 1/7/2014    Anxiety     AR (allergic rhinitis)     Cataract     COPD (chronic obstructive pulmonary disease)     Gastroesophageal reflux disease with hiatal hernia     History of bleeding  peptic ulcer 5/02/2010    History of gastritis 9/22/2009    with hemorrhage    HTN (hypertension), benign 6/14/2017    MDD (major depressive disorder), recurrent episode, moderate 3/3/2016    Osteoarthritis     PUD (peptic ulcer disease)     stomach       Past Surgical History:  Past Surgical History:   Procedure Laterality Date    APPENDECTOMY      CATARACT EXTRACTION W/  INTRAOCULAR LENS IMPLANT  8/20/2001, 10/01/2001    COLONOSCOPY  6/06/2011    ESOPHAGOGASTRODUODENOSCOPY  5/02/2010, 8/11/2010, 3/21/2011, 6/06/2011    LAMINOTOMY  5/20/2003    Left L4-L5    PENILE PROSTHESIS IMPLANT      ROTATOR CUFF REPAIR      Bilateral    TONSILLECTOMY, ADENOIDECTOMY      VASECTOMY         Family History:  Family History   Problem Relation Age of Onset    No Known Problems Sister     No Known Problems Daughter     No Known Problems Son     Amblyopia Neg Hx     Blindness Neg Hx     Cancer Neg Hx     Cataracts Neg Hx     Diabetes Neg Hx     Glaucoma Neg Hx     Hypertension Neg Hx     Macular degeneration Neg Hx     Retinal detachment Neg Hx     Strabismus Neg Hx     Stroke Neg Hx     Thyroid disease Neg Hx     Heart disease Neg Hx        Social History:  Social History     Social History    Marital status:      Spouse name: Ting    Number of children: 2    Years of education: N/A     Occupational History    Retired      Social History Main Topics    Smoking status: Never Smoker    Smokeless tobacco: Never Used    Alcohol use 4.2 oz/week     7 Cans of beer per week      Comment: 7-9 beers a week    Drug use: No    Sexual activity: Yes     Partners: Female     Other Topics Concern    None     Social History Narrative    None       OBJECTIVE:    /70   Pulse 72   Wt 91 kg (200 lb 9.6 oz)   BMI 27.21 kg/m²     PHYSICAL EXAMINATION:    General appearance: Well appearing, in no acute distress, alert and oriented x3.  Psych: Mood and affect appropriate.  Skin: Skin color, texture,  turgor normal, no rashes or lesions, in both upper and lower body.  Head/face: Atraumatic, normocephalic. No palpable lymph nodes  Neck: No pain to palpation over the cervical paraspinous muscles. Spurling Negative. No pain with neck flexion, extension, or lateral flexion. .  Back: Straight leg raising in the sitting and supine positions is negative to radicular pain. + pain to palpation over the L- spine . + Facet Loading Bilaterally.   Extremities: Peripheral joint ROM is full and pain free without obvious instability or laxity in all four extremities. No deformities, edema, or skin discoloration. Good capillary refill.  Musculoskeletal: Shoulder, hip, sacroiliac and knee provocative maneuvers are negative. Bilateral upper and lower extremity strength is normal and symmetric. No atrophy or tone abnormalities are noted.  Neuro: Bilateral upper and lower extremity coordination and muscle stretch reflexes are physiologic and symmetric 2+ in  UEs 1+ in LEs. Plantar response are downgoing. No loss of sensation is noted.  Gait: Normal.       ASSESSMENT: 82 y.o. year old male with axial low back and sacral pain  pain, consistent with     1. Lumbar facet arthropathy    2. Sacral insufficiency fracture, with routine healing, subsequent encounter    He had sacral insufficiency fracture sp sacroplasty by  6 weeks ago with complete relief of his sacral pain. Now he complains of axial low back pain.   He has hx of lamninectomy and removal of synovial cys      PLAN:     - I have stressed the importance of physical activity and a home exercise plan to help with pain and improve health.  - Schedule for a Diagnostic/Therapeutic Medial branch block at bilateral  L3,4, and L5 to help with axial low back pain and progress with a home exercise program.  - RTC 3 weeks after injection  - Counseled patient regarding the importance of activity modification, constant sleeping habits and physical therapy.    The above plan and  management options were discussed at length with patient. Patient is in agreement with the above and verbalized understanding.    Jamilah Haas  08/21/2017

## 2017-08-21 NOTE — DISCHARGE INSTRUCTIONS
Home Care Instructions Pain Management:    1.  DIET:    You may resume your normal diet today.    2.  BATHING:    You may shower with luke warm water.    3.  DRESSING:    You may remove your bandage today.    4.  ACTIVITY LEVEL:      You may resume your normal activities 24 hours after your procedure.    5.  MEDICATIONS:    You may resume your normal medications today.    6.  SPECIAL INSTRUCTIONS:    No heat to the injection site for 24 hours including bath or shower, heating pad, moist heat or hot tubs.    Use an ice pack to the injection site for any pain or discomfort.  Apply ice packs for 20 minute intervals as needed.    If you have received any sedatives by mouth today, you can not drive for 12 hours.    If you have received sedation through an IV, you can not drive for 24 hours.    PLEASE CALL YOUR DOCTOR FOR THE FOLLOWIN.  Redness or swelling around the injection site.  2.  Fever of 101 degrees.  3.  Drainage (pus) from the injection site.  4.  For any continuous bleeding (some dried blood over the incision is normal.)    FOR EMERGENCIES:    If any unusual problems or difficulties occur during clinic hours, call [phone] or dial [phone].    Follow up with with your physician in 2-3 weeks.

## 2017-08-21 NOTE — TELEPHONE ENCOUNTER
Spoke with patient's son (Gallo) regarding time of arrival for procedure that is scheduled on 8/23/17 . Patient's son verbalized instructions and confirmed procedure date and time of arrival on 8/23/17 at 215 PM.

## 2017-08-23 ENCOUNTER — HOSPITAL ENCOUNTER (OUTPATIENT)
Facility: HOSPITAL | Age: 82
Discharge: HOME | End: 2017-08-23
Attending: ANESTHESIOLOGY | Admitting: ANESTHESIOLOGY
Payer: MEDICARE

## 2017-08-23 ENCOUNTER — SURGERY (OUTPATIENT)
Age: 82
End: 2017-08-23

## 2017-08-23 VITALS
WEIGHT: 202 LBS | SYSTOLIC BLOOD PRESSURE: 145 MMHG | BODY MASS INDEX: 27.36 KG/M2 | DIASTOLIC BLOOD PRESSURE: 72 MMHG | OXYGEN SATURATION: 98 % | TEMPERATURE: 98 F | HEART RATE: 78 BPM | HEIGHT: 72 IN | RESPIRATION RATE: 18 BRPM

## 2017-08-23 PROCEDURE — 64495 INJ PARAVERT F JNT L/S 3 LEV: CPT | Mod: 50 | Performed by: ANESTHESIOLOGY

## 2017-08-23 PROCEDURE — 64493 INJ PARAVERT F JNT L/S 1 LEV: CPT | Mod: 50,,, | Performed by: ANESTHESIOLOGY

## 2017-08-23 PROCEDURE — 64493 INJ PARAVERT F JNT L/S 1 LEV: CPT | Mod: 50 | Performed by: ANESTHESIOLOGY

## 2017-08-23 PROCEDURE — 25000003 PHARM REV CODE 250: Performed by: ANESTHESIOLOGY

## 2017-08-23 PROCEDURE — 64494 INJ PARAVERT F JNT L/S 2 LEV: CPT | Mod: 50,,, | Performed by: ANESTHESIOLOGY

## 2017-08-23 PROCEDURE — 63600175 PHARM REV CODE 636 W HCPCS: Performed by: ANESTHESIOLOGY

## 2017-08-23 PROCEDURE — 99152 MOD SED SAME PHYS/QHP 5/>YRS: CPT | Mod: ,,, | Performed by: ANESTHESIOLOGY

## 2017-08-23 PROCEDURE — 64494 INJ PARAVERT F JNT L/S 2 LEV: CPT | Mod: 50 | Performed by: ANESTHESIOLOGY

## 2017-08-23 PROCEDURE — 64495 INJ PARAVERT F JNT L/S 3 LEV: CPT | Mod: 50,,, | Performed by: ANESTHESIOLOGY

## 2017-08-23 RX ORDER — FENTANYL CITRATE 50 UG/ML
INJECTION, SOLUTION INTRAMUSCULAR; INTRAVENOUS
Status: DISCONTINUED | OUTPATIENT
Start: 2017-08-23 | End: 2017-08-23 | Stop reason: HOSPADM

## 2017-08-23 RX ORDER — BUPIVACAINE HYDROCHLORIDE 2.5 MG/ML
INJECTION, SOLUTION EPIDURAL; INFILTRATION; INTRACAUDAL
Status: DISCONTINUED | OUTPATIENT
Start: 2017-08-23 | End: 2017-08-23 | Stop reason: HOSPADM

## 2017-08-23 RX ORDER — SODIUM CHLORIDE, SODIUM LACTATE, POTASSIUM CHLORIDE, CALCIUM CHLORIDE 600; 310; 30; 20 MG/100ML; MG/100ML; MG/100ML; MG/100ML
INJECTION, SOLUTION INTRAVENOUS CONTINUOUS
Status: DISCONTINUED | OUTPATIENT
Start: 2017-08-23 | End: 2017-08-23 | Stop reason: HOSPADM

## 2017-08-23 RX ORDER — LIDOCAINE HYDROCHLORIDE 10 MG/ML
INJECTION, SOLUTION EPIDURAL; INFILTRATION; INTRACAUDAL; PERINEURAL
Status: DISCONTINUED | OUTPATIENT
Start: 2017-08-23 | End: 2017-08-23 | Stop reason: HOSPADM

## 2017-08-23 RX ORDER — TRIAMCINOLONE ACETONIDE 40 MG/ML
INJECTION, SUSPENSION INTRA-ARTICULAR; INTRAMUSCULAR
Status: DISCONTINUED | OUTPATIENT
Start: 2017-08-23 | End: 2017-08-23 | Stop reason: HOSPADM

## 2017-08-23 RX ORDER — MIDAZOLAM HYDROCHLORIDE 1 MG/ML
INJECTION, SOLUTION INTRAMUSCULAR; INTRAVENOUS
Status: DISCONTINUED | OUTPATIENT
Start: 2017-08-23 | End: 2017-08-23 | Stop reason: HOSPADM

## 2017-08-23 RX ADMIN — TRIAMCINOLONE ACETONIDE 40 MG: 40 INJECTION, SUSPENSION INTRA-ARTICULAR; INTRAMUSCULAR at 02:08

## 2017-08-23 RX ADMIN — LIDOCAINE HYDROCHLORIDE 5 ML: 10 INJECTION, SOLUTION EPIDURAL; INFILTRATION; INTRACAUDAL; PERINEURAL at 02:08

## 2017-08-23 RX ADMIN — BUPIVACAINE HYDROCHLORIDE 5 ML: 2.5 INJECTION, SOLUTION EPIDURAL; INFILTRATION; INTRACAUDAL; PERINEURAL at 02:08

## 2017-08-23 RX ADMIN — MIDAZOLAM 2 MG: 1 INJECTION INTRAMUSCULAR; INTRAVENOUS at 02:08

## 2017-08-23 RX ADMIN — FENTANYL CITRATE 50 MCG: 50 INJECTION, SOLUTION INTRAMUSCULAR; INTRAVENOUS at 02:08

## 2017-08-23 NOTE — OP NOTE
"Patient Name: Leonid Delacruz Jr.  MRN: [id number]    INFORMED CONSENT: The procedure, risks, benefits and options were discussed with patient. There are no contraindications to the procedure. The patient expressed understanding and agreed to proceed. The personnel performing the procedure was discussed. I verify that I personally obtained Leonid's consent prior to the start of the procedure and the signed consent can be found on the patient's chart.    PROCEDURE: BILATERAL  LUMBAR FACET MEDIAL BRANCH NERVE BLOCK    Procedure Date: 8/23/2017  Anesthesia: systemic  Pre Procedure diagnosis:  LUMBAR FACET ARTHROPATHY   Post-Procedure diagnosis:  Same    Sedation: Yes - Fentanyl 50 mcg and Midazolam 2 mg      DESCRIPTION OF PROCEDURE:The patient was brought to the procedure room. IV access was obtained prior to the procedure. The patient was positioned prone on the fluoroscopy table. Continuous hemodynamic monitoring was initiated including blood pressure, EKG, and pulse oximetry. The area of the lumbar spine was prepped chlorhexidine three times and draped into a sterile field. Fluoroscopy was used to identify the location of the L3, L4, and L5 medial branch nerves at the junctions of the superior articular process and the transverse processes of  L4, L5, and the sacral ala respectively. Skin anesthesia was achieved using 5 cc of Lidocaine 1% over the injection sites. A 22 gauge, 3 1/2" spinal needle was slowly inserted at each level using AP, lateral and oblique fluoroscopic imaging. Negative aspiration for blood or CSF was confirmed. A combination of 5ml bupivacaine 0.25% and 40 mg of Kenalog was injected at all sites. The needles were removed and bleeding was nil. A sterile dressing was applied. No specimens collected. Leonid was taken back to the recovery room for further observation.     Blood Loss: Nill  Specimen: None    Pre Procedure Pain Level: 8/10  Post-Procedure Pain Level: 0/10        Discharge Diagnosis: " Same as Pre and Post Procedure  Condition on Discharge: Stable.  Diet on Discharge: Same as before.  Activity: as per instruction sheet.  Discharge to: Home with a responsible adult.  Follow up: as per Discharge instructions

## 2017-08-23 NOTE — OR NURSING
Discharge instructions given pt voiced understanding vitals stable iv removed latisha well denies pain at this time

## 2017-08-28 ENCOUNTER — TELEPHONE (OUTPATIENT)
Dept: PAIN MEDICINE | Facility: HOSPITAL | Age: 82
End: 2017-08-28

## 2017-09-12 ENCOUNTER — OFFICE VISIT (OUTPATIENT)
Dept: PAIN MEDICINE | Facility: CLINIC | Age: 82
End: 2017-09-12
Payer: MEDICARE

## 2017-09-12 VITALS
DIASTOLIC BLOOD PRESSURE: 78 MMHG | SYSTOLIC BLOOD PRESSURE: 114 MMHG | WEIGHT: 202 LBS | HEART RATE: 72 BPM | BODY MASS INDEX: 27.4 KG/M2

## 2017-09-12 DIAGNOSIS — M84.48XD SACRAL INSUFFICIENCY FRACTURE, WITH ROUTINE HEALING, SUBSEQUENT ENCOUNTER: ICD-10-CM

## 2017-09-12 DIAGNOSIS — M47.816 FACET HYPERTROPHY OF LUMBAR REGION: Primary | ICD-10-CM

## 2017-09-12 PROCEDURE — 1157F ADVNC CARE PLAN IN RCRD: CPT | Mod: S$GLB,,, | Performed by: ANESTHESIOLOGY

## 2017-09-12 PROCEDURE — 3078F DIAST BP <80 MM HG: CPT | Mod: S$GLB,,, | Performed by: ANESTHESIOLOGY

## 2017-09-12 PROCEDURE — 3008F BODY MASS INDEX DOCD: CPT | Mod: S$GLB,,, | Performed by: ANESTHESIOLOGY

## 2017-09-12 PROCEDURE — 99999 PR PBB SHADOW E&M-EST. PATIENT-LVL III: CPT | Mod: PBBFAC,,, | Performed by: ANESTHESIOLOGY

## 2017-09-12 PROCEDURE — 99213 OFFICE O/P EST LOW 20 MIN: CPT | Mod: S$GLB,,, | Performed by: ANESTHESIOLOGY

## 2017-09-12 PROCEDURE — 1125F AMNT PAIN NOTED PAIN PRSNT: CPT | Mod: S$GLB,,, | Performed by: ANESTHESIOLOGY

## 2017-09-12 PROCEDURE — 99499 UNLISTED E&M SERVICE: CPT | Mod: S$GLB,,, | Performed by: ANESTHESIOLOGY

## 2017-09-12 PROCEDURE — 1159F MED LIST DOCD IN RCRD: CPT | Mod: S$GLB,,, | Performed by: ANESTHESIOLOGY

## 2017-09-12 PROCEDURE — 3074F SYST BP LT 130 MM HG: CPT | Mod: S$GLB,,, | Performed by: ANESTHESIOLOGY

## 2017-09-12 NOTE — PROGRESS NOTES
Chronic patient Established Note (Follow up visit)      SUBJECTIVE:    Leonid Delacruz Jr. presents to the clinic for a follow-up appointment for back pain and is S/P BILATERAL  LUMBAR FACET MEDIAL BRANCH NERVE BLOCK done 8/23/17 with 70% relief for 2 weeks . Since the last visit, Leonid Delacruz Jr. states the pain has been persistant. Current pain intensity is 3/10.    Pain Disability Index Review:  Last 3 PDI Scores 9/12/2017 8/21/2017 5/11/2017   Pain Disability Index (PDI) 21 56 60       Pain Medications:    - Opioids: Ultram (Tramadol HCL)  - Adjuvant Medications: Celebrex  - Anti-Coagulants:None  - Others: See Medication List    Opioid Contract: no     report:  Reviewed and consistent with medication use as prescribed.    Pain Procedures:  Injections with Dr. Constantino including MBB and MB RFA  Facet joints injections by IR     Physical Therapy/Home Exercise: yes    Imaging: X-Ray Sacrum And Coccyx 7/20/17  Narrative      AP pelvis sacrum and coccyx 3 views.  Comparison 1040 HR study same day.  Fracture deformity right central acetabulum, comminuted fracture right symphysis pubis, post penile therapy prosthesis surgery, vertebral plasty S1-S4 levels stable.  Advanced facet joint arthropathy, primarily anteriorly subluxation L4 on L5, L5 on S1 unchanged.   Impression       See results above.      Electronically signed by: JAYLAN BONILLA MD  Date: 07/20/17  Time: 13:14       X-Ray Sacrum And Coccyx 7/20/17  Narrative      Sacrum and coccyx.    Findings: 3 views.  There is postoperative change with bone cement identified involving the sacrum near the SI joints bilaterally.  There is a mildly displaced comminuted fracture of the superior and inferior pubic ramus adjacent to the symphysis on the right with probable mildly displaced fracture of the superior pubic ramus more posteriorly near the acetabulum.   Impression       As above.      Electronically signed by: YOLANDE VILA MD  Date: 07/20/17  Time: 10:50               MRI Lumbar Spine Without Contrast 5/10/17  [facility]   MRI LUMBAR SPINE    TECHNIQUE: MRI lumbar spine was performed without contrast. The following sequences were obtained: Localizer; sagittal T1, T2 CUBE, STIR; axial T1 and T2.    COMPARISON: 09/25/13    FINDINGS:    There are 5 lumbar vertebrae.  Postoperative changes noted at L4-L5 and L5-S1 consistent with prior decompression.  There is compression fracture of L1 with severe height loss, unchanged.  Remaining vertebral body heights are maintained.  There is grade 1 anterolisthesis of L4-L5 and L5-S1.  There is a nondisplaced fracture involving the S2 sacral segment and left sacral ala, demonstrating mild angulation.  No evidence for marrow infiltrative process.  Disc heights are maintained, noting multilevel disc desiccation. Conus terminates at T12-L1 and appears unremarkable. Note made of renal cysts.  Penile prosthesis reservoir noted anterior to the urinary bladder.  Paraspinal musculature is moderately atrophied.  Evaluation of sacroiliac joints is unremarkable.    T12-L1: Mild retropulsion of L1 vertebral body effaces the ventral thecal sac. No spinal canal stenosis or neuroforaminal narrowing.    L1-L2: No spinal canal stenosis or neuroforaminal narrowing.    L2-L3: Mild bilateral facet arthropathy noted.  No spinal canal stenosis or neuroforaminal narrowing.    L3-L4: Mild bilateral facet arthropathy noted.  No spinal canal stenosis or neuroforaminal narrowing.    L4-L5: Status post left laminectomy.  Circumferential disc bulge and moderate to severe bilateral facet arthropathy result in effacement of the dorsal thecal sac and mild right, moderate left neuroforaminal narrowing.    L5-S1: Status post bilateral laminectomy.  Uncovering of the intervertebral disc along with disc bulge and severe bilateral facet arthropathy noted.  No spinal canal stenosis or neuroforaminal narrowing.   Impression      1.  Acute fracture of the S2 vertebral  body and left sacral ala, likely insufficiency.  Recommend followup MRI pelvis in 3 months to ensure improvement in marrow signal and absence of underlying neoplasm.  2.  Degenerative and postoperative changes of lumbar spine as detailed above.      Electronically signed by: BRIDGETTE SUAREZ MD  Date: 05/10/17  Time: 08:57       4/19/17 X-Ray Sacrum And Coccyx   Narrative   Sacrum and coccyx.  Degenerative change of the hips.  Postop changes noted.  No acute fracture or bone destruction evident.  Degenerative change in lumbar spine.   Impression    See above          Xray Lumbar Spine 3/3/16      lumbar spine with obliques compared to September 2013.  Bones are demineralized.  Moderate compression fracture involving the L1 vertebral body unchanged.  There is facet arthropathy particularly at the lower lumbar levels with a grade one antral listhesis of L5 on S1.  No convincing spondylolysis.  Calcified plaque in the aorta.    Impression compression fracture and degenerative changes as above.  ______________________________________     Electronically signed by: BRITNEY CASEY MD  Date: 03/03/16  Time: 10:39     Encounter   View Encounter          MRI Lumbar Spine 9/25/16  MRI LUMBAR SPINE    TECHNIQUE: MRI lumbar spine was performed without contrast on a 1.5T magnet. The following sequences were obtained: Localizer; sagittal T1, T2, STIR; axial T1 and T2.    COMPARISON: 1/31/2012    FINDINGS:    There are 5 lumbar vertebrae.  There is mild (grade 1) anterolisthesis of L5 on S1.  There is a chronic compression fracture of the L1 vertebral body with greater than 50% loss of vertebral body height.  There is an associated marrow edema.  Vertebral   body heights are preserved the remaining levels.  There is multilevel disk desiccation.  Disk heights are relatively well preserved. Conus terminates at L1 and appears unremarkable. Limited evaluation of posterior abdominal structures is unremarkable.    Paraspinal musculature is  markedly atrophied below the level of S1 on the left.  Evaluation of sacroiliac joints is unremarkable.    L1-L2: No spinal canal stenosis or neuroforaminal narrowing.    L2-L3: Mild diffuse disk bulge without spinal canal or neuroforaminal stenosis    L3-L4: Mild bilateral facet hypertrophy without spinal canal or neuroforaminal stenosis.    L4-L5: Severe bilateral facet arthropathy, greater on the right and mild uncovering of the intervertebral disk results in mild bilateral neural foraminal stenosis.  There is been prior bilateral laminectomy.    L5-S1: Severe bilateral facet arthropathy and an uncovering of the intervertebral disk with mild neuroforaminal narrowing.  No spinal canal stenosis.   Impression        Severe facet joint arthropathy at L4-5 and L5-S1, similar to the 1/31/12 exam. This contributes to mild bilateral neuroforaminal narrowing.  No significant central canal stenosis.   Mild anterolisthesis of L4 on L5 and L5 on S1 noted, unchanged.  ______________________________________     Electronically signed by resident: EMELIA ARAGON MD  Date: 09/25/13  Time: 17:05          As the supervising and teaching physician, I personally reviewed the images and resident's interpretation and I agree with the findings.          Allergies: Review of patient's allergies indicates:  No Known Allergies    Current Medications:   Current Outpatient Prescriptions   Medication Sig Dispense Refill    albuterol (PROAIR HFA) 90 mcg/actuation inhaler 2 HFA Aerosol Inhaler Inhalation Four times a day p;rn 3 Inhaler 3    alendronate (FOSAMAX) 10 MG Tab       amlodipine (NORVASC) 5 MG tablet Take 1 tablet (5 mg total) by mouth once daily. 30 tablet 11    aspirin 81 MG Chew Take 81 mg by mouth once daily.      calcium carbonate (OS-JOANA) 500 mg calcium (1,250 mg) tablet Take 1 tablet (500 mg total) by mouth 2 (two) times daily.  0    celecoxib (CELEBREX) 200 MG capsule Take 1 capsule (200 mg total) by mouth 2 (two) times  daily. 60 capsule 2    cholecalciferol, vitamin D3, 400 unit Cap Take 1 capsule (400 Units total) by mouth 2 (two) times daily.  0    omeprazole (PRILOSEC) 40 MG capsule TAKE 1 CAPSULE BY MOUTH TWICE DAILY BEFORE MEALS 60 capsule 5    sertraline (ZOLOFT) 100 MG tablet TAKE ONE TABLET BY MOUTH ONCE DAILY. 90 tablet 3    SYMBICORT 160-4.5 mcg/actuation HFAA INHALE 2 PUFFS INTO LUNGS EVERY 12 HOURS 10.2 g 0    SYMBICORT 160-4.5 mcg/actuation HFAA INHALE 2 PUFFS BY MOUTH TWICE DAILY 10.2 g 0    tamsulosin (FLOMAX) 0.4 mg Cp24 TAKE 1 CAPSULE BY MOUTH DAILY 90 capsule 3    tramadol (ULTRAM) 50 mg tablet Take 1-2 tabs PO q6 hrs PRN 60 tablet 0     No current facility-administered medications for this visit.        REVIEW OF SYSTEMS:    GENERAL:  No weight loss, malaise or fevers.  HEENT:  Negative for frequent or significant headaches.  NECK:  Negative for lumps, goiter, pain and significant neck swelling.  RESPIRATORY:  Negative for cough, wheezing or shortness of breath.  CARDIOVASCULAR:  Negative for chest pain, leg swelling or palpitations.  GI:  Negative for abdominal discomfort, blood in stools or black stools or change in bowel habits.  MUSCULOSKELETAL:  See HPI.  SKIN:  Negative for lesions, rash, and itching.  PSYCH:  Negative for sleep disturbance, mood disorder and recent psychosocial stressors.  HEMATOLOGY/LYMPHOLOGY:  Negative for prolonged bleeding, bruising easily or swollen nodes.  NEURO:   No history of headaches, syncope, paralysis, seizures or tremors.  All other reviewed and negative other than HPI.    Past Medical History:  Past Medical History:   Diagnosis Date    Amblyopia, strabismic - Left Eye 1/7/2014    Anxiety     AR (allergic rhinitis)     Cataract     COPD (chronic obstructive pulmonary disease)     Gastroesophageal reflux disease with hiatal hernia     History of bleeding peptic ulcer 5/02/2010    History of gastritis 9/22/2009    with hemorrhage    HTN (hypertension), benign  6/14/2017    MDD (major depressive disorder), recurrent episode, moderate 3/3/2016    Osteoarthritis     PUD (peptic ulcer disease)     stomach       Past Surgical History:  Past Surgical History:   Procedure Laterality Date    APPENDECTOMY      CATARACT EXTRACTION W/  INTRAOCULAR LENS IMPLANT  8/20/2001, 10/01/2001    COLONOSCOPY  6/06/2011    ESOPHAGOGASTRODUODENOSCOPY  5/02/2010, 8/11/2010, 3/21/2011, 6/06/2011    LAMINOTOMY  5/20/2003    Left L4-L5    PENILE PROSTHESIS IMPLANT      ROTATOR CUFF REPAIR      Bilateral    TONSILLECTOMY, ADENOIDECTOMY      VASECTOMY         Family History:  Family History   Problem Relation Age of Onset    No Known Problems Sister     No Known Problems Daughter     No Known Problems Son     Amblyopia Neg Hx     Blindness Neg Hx     Cancer Neg Hx     Cataracts Neg Hx     Diabetes Neg Hx     Glaucoma Neg Hx     Hypertension Neg Hx     Macular degeneration Neg Hx     Retinal detachment Neg Hx     Strabismus Neg Hx     Stroke Neg Hx     Thyroid disease Neg Hx     Heart disease Neg Hx        Social History:  Social History     Social History    Marital status:      Spouse name: Ting    Number of children: 2    Years of education: N/A     Occupational History    Retired      Social History Main Topics    Smoking status: Never Smoker    Smokeless tobacco: Never Used    Alcohol use 4.2 oz/week     7 Cans of beer per week      Comment: 7-9 beers a week    Drug use: No    Sexual activity: Yes     Partners: Female     Other Topics Concern    None     Social History Narrative    None       OBJECTIVE:    /78   Pulse 72   Wt 91.6 kg (202 lb)   BMI 27.40 kg/m²     PHYSICAL EXAMINATION:    General appearance: Well appearing, in no acute distress, alert and oriented x3.  Psych: Mood and affect appropriate.  Skin: Skin color, texture, turgor normal, no rashes or lesions, in both upper and lower body.  Head/face: Atraumatic, normocephalic. No  palpable lymph nodes .  Back: Straight leg raising in the sitting and supine positions is negative to radicular pain. + pain to palpation over the L- spine . + Facet Loading Bilaterally.   Extremities: Peripheral joint ROM is full and pain free without obvious instability or laxity in all four extremities. No deformities, edema, or skin discoloration. Good capillary refill.  Musculoskeletal: Shoulder, hip, sacroiliac and knee provocative maneuvers are negative. Bilateral upper and lower extremity strength is normal and symmetric. No atrophy or tone abnormalities are noted.  Neuro: Bilateral upper and lower extremity coordination and muscle stretch reflexes are physiologic and symmetric 2+ in  UEs 1+ in LEs. Plantar response are downgoing. No loss of sensation is noted.  Gait: Normal.    ASSESSMENT: 82 y.o. year old male with axial low back and sacral pain  pain, consistent with   1. Facet hypertrophy of lumbar region    2. Sacral insufficiency fracture, with routine healing, subsequent encounter        He had 70% pain relief after bilateral L3,4,5 facet MBB for 2 weeks.   He had sacral insufficiency fracture sp sacroplasty by  9 weeks ago with complete relief of his sacral pain       PLAN:     - I have stressed the importance of physical activity and a home exercise plan to help with pain and improve health.  -Sf bilateral L3,4,5 facet MBB # 2. Then will consider RFA   - RTC 3 weeks after MBB  - Counseled patient regarding the importance of activity modification, constant sleeping habits and physical therapy.    The above plan and management options were discussed at length with patient. Patient is in agreement with the above and verbalized understanding.    Jamilah Haas  09/12/2017

## 2017-09-15 NOTE — DISCHARGE INSTRUCTIONS
Home Care Instructions Pain Management:    1.  DIET:    You may resume your normal diet today.    2.  BATHING:    You may shower with luke warm water.    3.  DRESSING:    You may remove your bandage today.    4.  ACTIVITY LEVEL:      You may resume your normal activities 24 hours after your procedure.    5.  MEDICATIONS:    You may resume your normal medications today.    6.  SPECIAL INSTRUCTIONS:    No heat to the injection site for 24 hours including bath or shower, heating pad, moist heat or hot tubs.    Use an ice pack to the injection site for any pain or discomfort.  Apply ice packs for 20 minute intervals as needed.    If you have received any sedatives by mouth today, you can not drive for 12 hours.    If you have received sedation through an IV, you can not drive for 24 hours.    PLEASE CALL YOUR DOCTOR FOR THE FOLLOWIN.  Redness or swelling around the injection site.  2.  Fever of 101 degrees.  3.  Drainage (pus) from the injection site.  4.  For any continuous bleeding (some dried blood over the incision is normal.)    FOR EMERGENCIES:    If any unusual problems or difficulties occur during clinic hours, call [phone] or dial [phone].    Follow up with with your physician in 2-3 weeks.

## 2017-09-20 ENCOUNTER — SURGERY (OUTPATIENT)
Age: 82
End: 2017-09-20

## 2017-09-20 ENCOUNTER — HOSPITAL ENCOUNTER (OUTPATIENT)
Facility: HOSPITAL | Age: 82
Discharge: HOME | End: 2017-09-20
Attending: ANESTHESIOLOGY | Admitting: ANESTHESIOLOGY
Payer: MEDICARE

## 2017-09-20 VITALS
SYSTOLIC BLOOD PRESSURE: 138 MMHG | HEART RATE: 72 BPM | OXYGEN SATURATION: 97 % | BODY MASS INDEX: 27.09 KG/M2 | TEMPERATURE: 98 F | DIASTOLIC BLOOD PRESSURE: 58 MMHG | RESPIRATION RATE: 14 BRPM | WEIGHT: 200 LBS | HEIGHT: 72 IN

## 2017-09-20 PROCEDURE — 64494 INJ PARAVERT F JNT L/S 2 LEV: CPT | Mod: 50 | Performed by: ANESTHESIOLOGY

## 2017-09-20 PROCEDURE — 99152 MOD SED SAME PHYS/QHP 5/>YRS: CPT | Mod: ,,, | Performed by: ANESTHESIOLOGY

## 2017-09-20 PROCEDURE — 64495 INJ PARAVERT F JNT L/S 3 LEV: CPT | Mod: 50 | Performed by: ANESTHESIOLOGY

## 2017-09-20 PROCEDURE — 64493 INJ PARAVERT F JNT L/S 1 LEV: CPT | Mod: 50,,, | Performed by: ANESTHESIOLOGY

## 2017-09-20 PROCEDURE — 64494 INJ PARAVERT F JNT L/S 2 LEV: CPT | Mod: 50,,, | Performed by: ANESTHESIOLOGY

## 2017-09-20 PROCEDURE — 64493 INJ PARAVERT F JNT L/S 1 LEV: CPT | Mod: 50 | Performed by: ANESTHESIOLOGY

## 2017-09-20 PROCEDURE — 63600175 PHARM REV CODE 636 W HCPCS: Performed by: ANESTHESIOLOGY

## 2017-09-20 PROCEDURE — 25000003 PHARM REV CODE 250: Performed by: ANESTHESIOLOGY

## 2017-09-20 PROCEDURE — 64495 INJ PARAVERT F JNT L/S 3 LEV: CPT | Mod: 50,,, | Performed by: ANESTHESIOLOGY

## 2017-09-20 RX ORDER — BUPIVACAINE HYDROCHLORIDE 2.5 MG/ML
INJECTION, SOLUTION EPIDURAL; INFILTRATION; INTRACAUDAL
Status: DISCONTINUED | OUTPATIENT
Start: 2017-09-20 | End: 2017-09-20 | Stop reason: HOSPADM

## 2017-09-20 RX ORDER — SODIUM CHLORIDE, SODIUM LACTATE, POTASSIUM CHLORIDE, CALCIUM CHLORIDE 600; 310; 30; 20 MG/100ML; MG/100ML; MG/100ML; MG/100ML
INJECTION, SOLUTION INTRAVENOUS CONTINUOUS
Status: DISCONTINUED | OUTPATIENT
Start: 2017-09-20 | End: 2017-09-20 | Stop reason: HOSPADM

## 2017-09-20 RX ORDER — LIDOCAINE HYDROCHLORIDE 10 MG/ML
INJECTION, SOLUTION EPIDURAL; INFILTRATION; INTRACAUDAL; PERINEURAL
Status: DISCONTINUED | OUTPATIENT
Start: 2017-09-20 | End: 2017-09-20 | Stop reason: HOSPADM

## 2017-09-20 RX ORDER — MIDAZOLAM HYDROCHLORIDE 1 MG/ML
INJECTION, SOLUTION INTRAMUSCULAR; INTRAVENOUS
Status: DISCONTINUED | OUTPATIENT
Start: 2017-09-20 | End: 2017-09-20 | Stop reason: HOSPADM

## 2017-09-20 RX ORDER — FENTANYL CITRATE 50 UG/ML
INJECTION, SOLUTION INTRAMUSCULAR; INTRAVENOUS
Status: DISCONTINUED | OUTPATIENT
Start: 2017-09-20 | End: 2017-09-20 | Stop reason: HOSPADM

## 2017-09-20 RX ADMIN — MIDAZOLAM 2 MG: 1 INJECTION INTRAMUSCULAR; INTRAVENOUS at 09:09

## 2017-09-20 RX ADMIN — SODIUM CHLORIDE, SODIUM LACTATE, POTASSIUM CHLORIDE, AND CALCIUM CHLORIDE: .6; .31; .03; .02 INJECTION, SOLUTION INTRAVENOUS at 08:09

## 2017-09-20 RX ADMIN — LIDOCAINE HYDROCHLORIDE 5 ML: 10 INJECTION, SOLUTION EPIDURAL; INFILTRATION; INTRACAUDAL; PERINEURAL at 09:09

## 2017-09-20 RX ADMIN — FENTANYL CITRATE 50 MCG: 50 INJECTION, SOLUTION INTRAMUSCULAR; INTRAVENOUS at 09:09

## 2017-09-20 RX ADMIN — BUPIVACAINE HYDROCHLORIDE 5 ML: 2.5 INJECTION, SOLUTION EPIDURAL; INFILTRATION; INTRACAUDAL; PERINEURAL at 09:09

## 2017-09-20 NOTE — H&P (VIEW-ONLY)
Chronic patient Established Note (Follow up visit)      SUBJECTIVE:    Leonid Delacruz Jr. presents to the clinic for a follow-up appointment for back pain and is S/P BILATERAL  LUMBAR FACET MEDIAL BRANCH NERVE BLOCK done 8/23/17 with 70% relief for 2 weeks . Since the last visit, Leonid Delacruz Jr. states the pain has been persistant. Current pain intensity is 3/10.    Pain Disability Index Review:  Last 3 PDI Scores 9/12/2017 8/21/2017 5/11/2017   Pain Disability Index (PDI) 21 56 60       Pain Medications:    - Opioids: Ultram (Tramadol HCL)  - Adjuvant Medications: Celebrex  - Anti-Coagulants:None  - Others: See Medication List    Opioid Contract: no     report:  Reviewed and consistent with medication use as prescribed.    Pain Procedures:  Injections with Dr. Constantino including MBB and MB RFA  Facet joints injections by IR     Physical Therapy/Home Exercise: yes    Imaging: X-Ray Sacrum And Coccyx 7/20/17  Narrative      AP pelvis sacrum and coccyx 3 views.  Comparison 1040 HR study same day.  Fracture deformity right central acetabulum, comminuted fracture right symphysis pubis, post penile therapy prosthesis surgery, vertebral plasty S1-S4 levels stable.  Advanced facet joint arthropathy, primarily anteriorly subluxation L4 on L5, L5 on S1 unchanged.   Impression       See results above.      Electronically signed by: JAYLAN BONILLA MD  Date: 07/20/17  Time: 13:14       X-Ray Sacrum And Coccyx 7/20/17  Narrative      Sacrum and coccyx.    Findings: 3 views.  There is postoperative change with bone cement identified involving the sacrum near the SI joints bilaterally.  There is a mildly displaced comminuted fracture of the superior and inferior pubic ramus adjacent to the symphysis on the right with probable mildly displaced fracture of the superior pubic ramus more posteriorly near the acetabulum.   Impression       As above.      Electronically signed by: YOLANDE VILA MD  Date: 07/20/17  Time: 10:50               MRI Lumbar Spine Without Contrast 5/10/17  [facility]   MRI LUMBAR SPINE    TECHNIQUE: MRI lumbar spine was performed without contrast. The following sequences were obtained: Localizer; sagittal T1, T2 CUBE, STIR; axial T1 and T2.    COMPARISON: 09/25/13    FINDINGS:    There are 5 lumbar vertebrae.  Postoperative changes noted at L4-L5 and L5-S1 consistent with prior decompression.  There is compression fracture of L1 with severe height loss, unchanged.  Remaining vertebral body heights are maintained.  There is grade 1 anterolisthesis of L4-L5 and L5-S1.  There is a nondisplaced fracture involving the S2 sacral segment and left sacral ala, demonstrating mild angulation.  No evidence for marrow infiltrative process.  Disc heights are maintained, noting multilevel disc desiccation. Conus terminates at T12-L1 and appears unremarkable. Note made of renal cysts.  Penile prosthesis reservoir noted anterior to the urinary bladder.  Paraspinal musculature is moderately atrophied.  Evaluation of sacroiliac joints is unremarkable.    T12-L1: Mild retropulsion of L1 vertebral body effaces the ventral thecal sac. No spinal canal stenosis or neuroforaminal narrowing.    L1-L2: No spinal canal stenosis or neuroforaminal narrowing.    L2-L3: Mild bilateral facet arthropathy noted.  No spinal canal stenosis or neuroforaminal narrowing.    L3-L4: Mild bilateral facet arthropathy noted.  No spinal canal stenosis or neuroforaminal narrowing.    L4-L5: Status post left laminectomy.  Circumferential disc bulge and moderate to severe bilateral facet arthropathy result in effacement of the dorsal thecal sac and mild right, moderate left neuroforaminal narrowing.    L5-S1: Status post bilateral laminectomy.  Uncovering of the intervertebral disc along with disc bulge and severe bilateral facet arthropathy noted.  No spinal canal stenosis or neuroforaminal narrowing.   Impression      1.  Acute fracture of the S2 vertebral  body and left sacral ala, likely insufficiency.  Recommend followup MRI pelvis in 3 months to ensure improvement in marrow signal and absence of underlying neoplasm.  2.  Degenerative and postoperative changes of lumbar spine as detailed above.      Electronically signed by: BRIDGETTE SUAREZ MD  Date: 05/10/17  Time: 08:57       4/19/17 X-Ray Sacrum And Coccyx   Narrative   Sacrum and coccyx.  Degenerative change of the hips.  Postop changes noted.  No acute fracture or bone destruction evident.  Degenerative change in lumbar spine.   Impression    See above          Xray Lumbar Spine 3/3/16      lumbar spine with obliques compared to September 2013.  Bones are demineralized.  Moderate compression fracture involving the L1 vertebral body unchanged.  There is facet arthropathy particularly at the lower lumbar levels with a grade one antral listhesis of L5 on S1.  No convincing spondylolysis.  Calcified plaque in the aorta.    Impression compression fracture and degenerative changes as above.  ______________________________________     Electronically signed by: BRITNEY CASEY MD  Date: 03/03/16  Time: 10:39     Encounter   View Encounter          MRI Lumbar Spine 9/25/16  MRI LUMBAR SPINE    TECHNIQUE: MRI lumbar spine was performed without contrast on a 1.5T magnet. The following sequences were obtained: Localizer; sagittal T1, T2, STIR; axial T1 and T2.    COMPARISON: 1/31/2012    FINDINGS:    There are 5 lumbar vertebrae.  There is mild (grade 1) anterolisthesis of L5 on S1.  There is a chronic compression fracture of the L1 vertebral body with greater than 50% loss of vertebral body height.  There is an associated marrow edema.  Vertebral   body heights are preserved the remaining levels.  There is multilevel disk desiccation.  Disk heights are relatively well preserved. Conus terminates at L1 and appears unremarkable. Limited evaluation of posterior abdominal structures is unremarkable.    Paraspinal musculature is  markedly atrophied below the level of S1 on the left.  Evaluation of sacroiliac joints is unremarkable.    L1-L2: No spinal canal stenosis or neuroforaminal narrowing.    L2-L3: Mild diffuse disk bulge without spinal canal or neuroforaminal stenosis    L3-L4: Mild bilateral facet hypertrophy without spinal canal or neuroforaminal stenosis.    L4-L5: Severe bilateral facet arthropathy, greater on the right and mild uncovering of the intervertebral disk results in mild bilateral neural foraminal stenosis.  There is been prior bilateral laminectomy.    L5-S1: Severe bilateral facet arthropathy and an uncovering of the intervertebral disk with mild neuroforaminal narrowing.  No spinal canal stenosis.   Impression        Severe facet joint arthropathy at L4-5 and L5-S1, similar to the 1/31/12 exam. This contributes to mild bilateral neuroforaminal narrowing.  No significant central canal stenosis.   Mild anterolisthesis of L4 on L5 and L5 on S1 noted, unchanged.  ______________________________________     Electronically signed by resident: EMELIA ARAGON MD  Date: 09/25/13  Time: 17:05          As the supervising and teaching physician, I personally reviewed the images and resident's interpretation and I agree with the findings.          Allergies: Review of patient's allergies indicates:  No Known Allergies    Current Medications:   Current Outpatient Prescriptions   Medication Sig Dispense Refill    albuterol (PROAIR HFA) 90 mcg/actuation inhaler 2 HFA Aerosol Inhaler Inhalation Four times a day p;rn 3 Inhaler 3    alendronate (FOSAMAX) 10 MG Tab       amlodipine (NORVASC) 5 MG tablet Take 1 tablet (5 mg total) by mouth once daily. 30 tablet 11    aspirin 81 MG Chew Take 81 mg by mouth once daily.      calcium carbonate (OS-JOANA) 500 mg calcium (1,250 mg) tablet Take 1 tablet (500 mg total) by mouth 2 (two) times daily.  0    celecoxib (CELEBREX) 200 MG capsule Take 1 capsule (200 mg total) by mouth 2 (two) times  daily. 60 capsule 2    cholecalciferol, vitamin D3, 400 unit Cap Take 1 capsule (400 Units total) by mouth 2 (two) times daily.  0    omeprazole (PRILOSEC) 40 MG capsule TAKE 1 CAPSULE BY MOUTH TWICE DAILY BEFORE MEALS 60 capsule 5    sertraline (ZOLOFT) 100 MG tablet TAKE ONE TABLET BY MOUTH ONCE DAILY. 90 tablet 3    SYMBICORT 160-4.5 mcg/actuation HFAA INHALE 2 PUFFS INTO LUNGS EVERY 12 HOURS 10.2 g 0    SYMBICORT 160-4.5 mcg/actuation HFAA INHALE 2 PUFFS BY MOUTH TWICE DAILY 10.2 g 0    tamsulosin (FLOMAX) 0.4 mg Cp24 TAKE 1 CAPSULE BY MOUTH DAILY 90 capsule 3    tramadol (ULTRAM) 50 mg tablet Take 1-2 tabs PO q6 hrs PRN 60 tablet 0     No current facility-administered medications for this visit.        REVIEW OF SYSTEMS:    GENERAL:  No weight loss, malaise or fevers.  HEENT:  Negative for frequent or significant headaches.  NECK:  Negative for lumps, goiter, pain and significant neck swelling.  RESPIRATORY:  Negative for cough, wheezing or shortness of breath.  CARDIOVASCULAR:  Negative for chest pain, leg swelling or palpitations.  GI:  Negative for abdominal discomfort, blood in stools or black stools or change in bowel habits.  MUSCULOSKELETAL:  See HPI.  SKIN:  Negative for lesions, rash, and itching.  PSYCH:  Negative for sleep disturbance, mood disorder and recent psychosocial stressors.  HEMATOLOGY/LYMPHOLOGY:  Negative for prolonged bleeding, bruising easily or swollen nodes.  NEURO:   No history of headaches, syncope, paralysis, seizures or tremors.  All other reviewed and negative other than HPI.    Past Medical History:  Past Medical History:   Diagnosis Date    Amblyopia, strabismic - Left Eye 1/7/2014    Anxiety     AR (allergic rhinitis)     Cataract     COPD (chronic obstructive pulmonary disease)     Gastroesophageal reflux disease with hiatal hernia     History of bleeding peptic ulcer 5/02/2010    History of gastritis 9/22/2009    with hemorrhage    HTN (hypertension), benign  6/14/2017    MDD (major depressive disorder), recurrent episode, moderate 3/3/2016    Osteoarthritis     PUD (peptic ulcer disease)     stomach       Past Surgical History:  Past Surgical History:   Procedure Laterality Date    APPENDECTOMY      CATARACT EXTRACTION W/  INTRAOCULAR LENS IMPLANT  8/20/2001, 10/01/2001    COLONOSCOPY  6/06/2011    ESOPHAGOGASTRODUODENOSCOPY  5/02/2010, 8/11/2010, 3/21/2011, 6/06/2011    LAMINOTOMY  5/20/2003    Left L4-L5    PENILE PROSTHESIS IMPLANT      ROTATOR CUFF REPAIR      Bilateral    TONSILLECTOMY, ADENOIDECTOMY      VASECTOMY         Family History:  Family History   Problem Relation Age of Onset    No Known Problems Sister     No Known Problems Daughter     No Known Problems Son     Amblyopia Neg Hx     Blindness Neg Hx     Cancer Neg Hx     Cataracts Neg Hx     Diabetes Neg Hx     Glaucoma Neg Hx     Hypertension Neg Hx     Macular degeneration Neg Hx     Retinal detachment Neg Hx     Strabismus Neg Hx     Stroke Neg Hx     Thyroid disease Neg Hx     Heart disease Neg Hx        Social History:  Social History     Social History    Marital status:      Spouse name: Ting    Number of children: 2    Years of education: N/A     Occupational History    Retired      Social History Main Topics    Smoking status: Never Smoker    Smokeless tobacco: Never Used    Alcohol use 4.2 oz/week     7 Cans of beer per week      Comment: 7-9 beers a week    Drug use: No    Sexual activity: Yes     Partners: Female     Other Topics Concern    None     Social History Narrative    None       OBJECTIVE:    /78   Pulse 72   Wt 91.6 kg (202 lb)   BMI 27.40 kg/m²     PHYSICAL EXAMINATION:    General appearance: Well appearing, in no acute distress, alert and oriented x3.  Psych: Mood and affect appropriate.  Skin: Skin color, texture, turgor normal, no rashes or lesions, in both upper and lower body.  Head/face: Atraumatic, normocephalic. No  palpable lymph nodes .  Back: Straight leg raising in the sitting and supine positions is negative to radicular pain. + pain to palpation over the L- spine . + Facet Loading Bilaterally.   Extremities: Peripheral joint ROM is full and pain free without obvious instability or laxity in all four extremities. No deformities, edema, or skin discoloration. Good capillary refill.  Musculoskeletal: Shoulder, hip, sacroiliac and knee provocative maneuvers are negative. Bilateral upper and lower extremity strength is normal and symmetric. No atrophy or tone abnormalities are noted.  Neuro: Bilateral upper and lower extremity coordination and muscle stretch reflexes are physiologic and symmetric 2+ in  UEs 1+ in LEs. Plantar response are downgoing. No loss of sensation is noted.  Gait: Normal.    ASSESSMENT: 82 y.o. year old male with axial low back and sacral pain  pain, consistent with   1. Facet hypertrophy of lumbar region    2. Sacral insufficiency fracture, with routine healing, subsequent encounter        He had 70% pain relief after bilateral L3,4,5 facet MBB for 2 weeks.   He had sacral insufficiency fracture sp sacroplasty by  9 weeks ago with complete relief of his sacral pain       PLAN:     - I have stressed the importance of physical activity and a home exercise plan to help with pain and improve health.  -Sf bilateral L3,4,5 facet MBB # 2. Then will consider RFA   - RTC 3 weeks after MBB  - Counseled patient regarding the importance of activity modification, constant sleeping habits and physical therapy.    The above plan and management options were discussed at length with patient. Patient is in agreement with the above and verbalized understanding.    Jamilah Haas  09/12/2017

## 2017-09-20 NOTE — OP NOTE
"Patient Name: Leonid Delacruz Jr.  MRN: [id number]    INFORMED CONSENT: The procedure, risks, benefits and options were discussed with patient. There are no contraindications to the procedure. The patient expressed understanding and agreed to proceed. The personnel performing the procedure was discussed. I verify that I personally obtained Leonid's consent prior to the start of the procedure and the signed consent can be found on the patient's chart.    PROCEDURE: BILATERAL L3,4,5  LUMBAR FACET MEDIAL BRANCH NERVE BLOCK    Procedure Date: 9/20/2017  Anesthesia: systemic  Pre Procedure diagnosis:  Lumbar facet hypertrophy, lumbar spondylosis , lumbar DDD  Post-Procedure diagnosis:  Same    Sedation: Yes - Fentanyl 50 mcg and Midazolam 2 mg      DESCRIPTION OF PROCEDURE:The patient was brought to the procedure room. IV access was obtained prior to the procedure. The patient was positioned prone on the fluoroscopy table. Continuous hemodynamic monitoring was initiated including blood pressure, EKG, and pulse oximetry. The area of the lumbar spine was prepped chlorhexidine three times and draped into a sterile field. Fluoroscopy was used to identify the location of the L3, L4, and L5 medial branch nerves at the junctions of the superior articular process and the transverse processes of  L4, L5, and the sacral ala respectively. Skin anesthesia was achieved using 5 cc of Lidocaine 1% over the injection sites. A 22 gauge, 3 1/2" spinal needle was slowly inserted at each level using AP, lateral and oblique fluoroscopic imaging. Negative aspiration for blood or CSF was confirmed. A combination of 5ml bupivacaine 0.25% and 40 mg of Kenalog was injected at all sites. The needles were removed and bleeding was nil. A sterile dressing was applied. No specimens collected. Leonid was taken back to the recovery room for further observation.     Blood Loss: Nill  Specimen: None    Pre Procedure Pain Level: 8/10  Post-Procedure Pain " Level: 0/10        Discharge Diagnosis: Same as Pre and Post Procedure  Condition on Discharge: Stable.  Diet on Discharge: Same as before.  Activity: as per instruction sheet.  Discharge to: Home with a responsible adult.  Follow up: as per Discharge instructions

## 2017-09-28 RX ORDER — CELECOXIB 200 MG/1
CAPSULE ORAL
Qty: 60 CAPSULE | Refills: 0 | Status: SHIPPED | OUTPATIENT
Start: 2017-09-28 | End: 2017-12-13 | Stop reason: SDUPTHER

## 2017-10-03 NOTE — PROGRESS NOTES
Chronic patient Established Note (Follow up visit)      SUBJECTIVE:    Leonid Delacruz Jr. presents to the clinic for a 2 wk follow-up appointment for back pain. He is BILATERAL L3,4,5 LUMBAR FACET MEDIAL BRANCH NERVE BLOCK on 9/20/17 with 60-70% relief. Discussed that we will move forward with the Lumbar RFA right then left.  Previous imaging was reviewed and discussed with the patient today.    Since the last visit, Leonid Delacruz JrBriana states the pain has been persistant. Current pain intensity is 3/10.    Pain Disability Index Review:  Last 3 PDI Scores 10/4/2017 9/12/2017 8/21/2017   Pain Disability Index (PDI) 18 21 56       Pain Medications:     - Opioids: Ultram (Tramadol HCL)  - Adjuvant Medications: None   - Anti-Coagulants: Aspirin  - Others: See Medication List    Opioid Contract: no     report:  Reviewed and consistent with medication use as prescribed.    Pain Procedures: 9/20/17 BILATERAL L3,4,5 LUMBAR FACET MEDIAL BRANCH NERVE BLOCK   Injections with Dr. Constantino including MBB and MB RFA  Facet joints injections by IR     Physical Therapy/Home Exercise: no    Imaging: X-Ray Sacrum And Coccyx 7/20/17  Narrative      AP pelvis sacrum and coccyx 3 views.  Comparison 1040 HR study same day.  Fracture deformity right central acetabulum, comminuted fracture right symphysis pubis, post penile therapy prosthesis surgery, vertebral plasty S1-S4 levels stable.  Advanced facet joint arthropathy, primarily anteriorly subluxation L4 on L5, L5 on S1 unchanged.   Impression       See results above.      Electronically signed by: JAYLAN BONILLA MD  Date: 07/20/17  Time: 13:14       X-Ray Sacrum And Coccyx 7/20/17  Narrative      Sacrum and coccyx.    Findings: 3 views.  There is postoperative change with bone cement identified involving the sacrum near the SI joints bilaterally.  There is a mildly displaced comminuted fracture of the superior and inferior pubic ramus adjacent to the symphysis on the right  with probable mildly displaced fracture of the superior pubic ramus more posteriorly near the acetabulum.   Impression       As above.      Electronically signed by: YOLANDE VILA MD  Date: 07/20/17  Time: 10:50              MRI Lumbar Spine Without Contrast 5/10/17  Narrative   MRI LUMBAR SPINE    TECHNIQUE: MRI lumbar spine was performed without contrast. The following sequences were obtained: Localizer; sagittal T1, T2 CUBE, STIR; axial T1 and T2.    COMPARISON: 09/25/13    FINDINGS:    There are 5 lumbar vertebrae.  Postoperative changes noted at L4-L5 and L5-S1 consistent with prior decompression.  There is compression fracture of L1 with severe height loss, unchanged.  Remaining vertebral body heights are maintained.  There is grade 1 anterolisthesis of L4-L5 and L5-S1.  There is a nondisplaced fracture involving the S2 sacral segment and left sacral ala, demonstrating mild angulation.  No evidence for marrow infiltrative process.  Disc heights are maintained, noting multilevel disc desiccation. Conus terminates at T12-L1 and appears unremarkable. Note made of renal cysts.  Penile prosthesis reservoir noted anterior to the urinary bladder.  Paraspinal musculature is moderately atrophied.  Evaluation of sacroiliac joints is unremarkable.    T12-L1: Mild retropulsion of L1 vertebral body effaces the ventral thecal sac. No spinal canal stenosis or neuroforaminal narrowing.    L1-L2: No spinal canal stenosis or neuroforaminal narrowing.    L2-L3: Mild bilateral facet arthropathy noted.  No spinal canal stenosis or neuroforaminal narrowing.    L3-L4: Mild bilateral facet arthropathy noted.  No spinal canal stenosis or neuroforaminal narrowing.    L4-L5: Status post left laminectomy.  Circumferential disc bulge and moderate to severe bilateral facet arthropathy result in effacement of the dorsal thecal sac and mild right, moderate left neuroforaminal narrowing.    L5-S1: Status post bilateral laminectomy.  Uncovering  of the intervertebral disc along with disc bulge and severe bilateral facet arthropathy noted.  No spinal canal stenosis or neuroforaminal narrowing.   Impression      1.  Acute fracture of the S2 vertebral body and left sacral ala, likely insufficiency.  Recommend followup MRI pelvis in 3 months to ensure improvement in marrow signal and absence of underlying neoplasm.  2.  Degenerative and postoperative changes of lumbar spine as detailed above.      Electronically signed by: BRIDGETTE SUAREZ MD  Date: 05/10/17  Time: 08:57       4/19/17 X-Ray Sacrum And Coccyx   Narrative   Sacrum and coccyx.  Degenerative change of the hips.  Postop changes noted.  No acute fracture or bone destruction evident.  Degenerative change in lumbar spine.   Impression    See above          Xray Lumbar Spine 3/3/16      lumbar spine with obliques compared to September 2013.  Bones are demineralized.  Moderate compression fracture involving the L1 vertebral body unchanged.  There is facet arthropathy particularly at the lower lumbar levels with a grade one antral listhesis of L5 on S1.  No convincing spondylolysis.  Calcified plaque in the aorta.    Impression compression fracture and degenerative changes as above.  ______________________________________     Electronically signed by: BRITNEY CASEY MD  Date: 03/03/16  Time: 10:39     Encounter   View Encounter          MRI Lumbar Spine 9/25/16  MRI LUMBAR SPINE    TECHNIQUE: MRI lumbar spine was performed without contrast on a 1.5T magnet. The following sequences were obtained: Localizer; sagittal T1, T2, STIR; axial T1 and T2.    COMPARISON: 1/31/2012    FINDINGS:    There are 5 lumbar vertebrae.  There is mild (grade 1) anterolisthesis of L5 on S1.  There is a chronic compression fracture of the L1 vertebral body with greater than 50% loss of vertebral body height.  There is an associated marrow edema.  Vertebral   body heights are preserved the remaining levels.  There is multilevel disk  desiccation.  Disk heights are relatively well preserved. Conus terminates at L1 and appears unremarkable. Limited evaluation of posterior abdominal structures is unremarkable.    Paraspinal musculature is markedly atrophied below the level of S1 on the left.  Evaluation of sacroiliac joints is unremarkable.    L1-L2: No spinal canal stenosis or neuroforaminal narrowing.    L2-L3: Mild diffuse disk bulge without spinal canal or neuroforaminal stenosis    L3-L4: Mild bilateral facet hypertrophy without spinal canal or neuroforaminal stenosis.    L4-L5: Severe bilateral facet arthropathy, greater on the right and mild uncovering of the intervertebral disk results in mild bilateral neural foraminal stenosis.  There is been prior bilateral laminectomy.    L5-S1: Severe bilateral facet arthropathy and an uncovering of the intervertebral disk with mild neuroforaminal narrowing.  No spinal canal stenosis.   Impression        Severe facet joint arthropathy at L4-5 and L5-S1, similar to the 1/31/12 exam. This contributes to mild bilateral neuroforaminal narrowing.  No significant central canal stenosis.   Mild anterolisthesis of L4 on L5 and L5 on S1 noted, unchanged.  ______________________________________     Electronically signed by resident: EMELIA ARAGON MD  Date: 09/25/13  Time: 17:05          As the supervising and teaching physician, I personally reviewed the images and resident's interpretation and I agree with the findings.           Allergies: Review of patient's allergies indicates:  No Known Allergies    Current Medications:   Current Outpatient Prescriptions   Medication Sig Dispense Refill    albuterol (PROAIR HFA) 90 mcg/actuation inhaler 2 HFA Aerosol Inhaler Inhalation Four times a day p;rn 3 Inhaler 3    alendronate (FOSAMAX) 10 MG Tab       amlodipine (NORVASC) 5 MG tablet Take 1 tablet (5 mg total) by mouth once daily. 30 tablet 11    aspirin 81 MG Chew Take 81 mg by mouth once daily.      calcium  carbonate (OS-JOANA) 500 mg calcium (1,250 mg) tablet Take 1 tablet (500 mg total) by mouth 2 (two) times daily.  0    cholecalciferol, vitamin D3, 400 unit Cap Take 1 capsule (400 Units total) by mouth 2 (two) times daily.  0    omeprazole (PRILOSEC) 40 MG capsule TAKE 1 CAPSULE BY MOUTH TWICE DAILY BEFORE MEALS 60 capsule 5    sertraline (ZOLOFT) 100 MG tablet TAKE ONE TABLET BY MOUTH ONCE DAILY. 90 tablet 3    SYMBICORT 160-4.5 mcg/actuation HFAA INHALE 2 PUFFS INTO LUNGS EVERY 12 HOURS 10.2 g 0    SYMBICORT 160-4.5 mcg/actuation HFAA INHALE 2 PUFFS BY MOUTH TWICE DAILY 10.2 g 0    tamsulosin (FLOMAX) 0.4 mg Cp24 TAKE 1 CAPSULE BY MOUTH DAILY 90 capsule 3    tramadol (ULTRAM) 50 mg tablet Take 1-2 tabs PO q6 hrs PRN 60 tablet 0    celecoxib (CELEBREX) 200 MG capsule TAKE 1 CAPSULE(200 MG) BY MOUTH TWICE DAILY 60 capsule 0     No current facility-administered medications for this visit.        REVIEW OF SYSTEMS:    GENERAL:  No weight loss, malaise or fevers.  HEENT:  Negative for frequent or significant headaches.  NECK:  Negative for lumps, goiter, pain and significant neck swelling.  RESPIRATORY:  Negative for cough, wheezing or shortness of breath.  CARDIOVASCULAR:  Negative for chest pain, leg swelling or palpitations.  GI:  Negative for abdominal discomfort, blood in stools or black stools or change in bowel habits.  MUSCULOSKELETAL:  See HPI.  SKIN:  Negative for lesions, rash, and itching.  PSYCH:  + for sleep disturbance, mood disorder and recent psychosocial stressors.  HEMATOLOGY/LYMPHOLOGY:  Negative for prolonged bleeding, bruising easily or swollen nodes.  NEURO:   No history of headaches, syncope, paralysis, seizures or tremors.  All other reviewed and negative other than HPI.    Past Medical History:  Past Medical History:   Diagnosis Date    Amblyopia, strabismic - Left Eye 1/7/2014    Anxiety     AR (allergic rhinitis)     Cataract     COPD (chronic obstructive pulmonary disease)      Gastroesophageal reflux disease with hiatal hernia     History of bleeding peptic ulcer 5/02/2010    History of gastritis 9/22/2009    with hemorrhage    HTN (hypertension), benign 6/14/2017    MDD (major depressive disorder), recurrent episode, moderate 3/3/2016    Osteoarthritis     PUD (peptic ulcer disease)     stomach       Past Surgical History:  Past Surgical History:   Procedure Laterality Date    APPENDECTOMY      CATARACT EXTRACTION W/  INTRAOCULAR LENS IMPLANT  8/20/2001, 10/01/2001    COLONOSCOPY  6/06/2011    ESOPHAGOGASTRODUODENOSCOPY  5/02/2010, 8/11/2010, 3/21/2011, 6/06/2011    LAMINOTOMY  5/20/2003    Left L4-L5    PENILE PROSTHESIS IMPLANT      ROTATOR CUFF REPAIR      Bilateral    TONSILLECTOMY, ADENOIDECTOMY      VASECTOMY         Family History:  Family History   Problem Relation Age of Onset    No Known Problems Sister     No Known Problems Daughter     No Known Problems Son     Amblyopia Neg Hx     Blindness Neg Hx     Cancer Neg Hx     Cataracts Neg Hx     Diabetes Neg Hx     Glaucoma Neg Hx     Hypertension Neg Hx     Macular degeneration Neg Hx     Retinal detachment Neg Hx     Strabismus Neg Hx     Stroke Neg Hx     Thyroid disease Neg Hx     Heart disease Neg Hx        Social History:  Social History     Social History    Marital status:      Spouse name: Ting    Number of children: 2    Years of education: N/A     Occupational History    Retired      Social History Main Topics    Smoking status: Never Smoker    Smokeless tobacco: Never Used    Alcohol use 4.2 oz/week     7 Cans of beer per week      Comment: 7-9 beers a week    Drug use: No    Sexual activity: Yes     Partners: Female     Other Topics Concern    None     Social History Narrative    None       OBJECTIVE:    /73   Pulse 98   Wt 89.1 kg (196 lb 6.9 oz)   BMI 26.64 kg/m²     PHYSICAL EXAMINATION:    General appearance: Well appearing, in no acute distress, alert and  oriented x3.  Psych: Mood and affect appropriate.  Skin: Skin color, texture, turgor normal, no rashes or lesions, in both upper and lower body.  Head/face: Atraumatic, normocephalic. No palpable lymph nodes .  Back: Straight leg raising in the sitting and supine positions is negative to radicular pain. + pain to palpation over the L- spine . + Facet Loading Bilaterally.   Extremities: Peripheral joint ROM is full and pain free without obvious instability or laxity in all four extremities. No deformities, edema, or skin discoloration. Good capillary refill.  Musculoskeletal: Shoulder, hip, sacroiliac and knee provocative maneuvers are negative. Bilateral upper and lower extremity strength is normal and symmetric. No atrophy or tone abnormalities are noted.  Neuro: Bilateral upper and lower extremity coordination and muscle stretch reflexes are physiologic and symmetric 2+ in  UEs 1+ in LEs. Plantar response are downgoing. No loss of sensation is noted.  Gait: Normal.    ASSESSMENT: 82 y.o. year old male with axial low back and sacral pain  pain, consistent with      He had 70% pain relief after bilateral L3,4,5 facet MBB for 2 weeks.   He had sacral insufficiency fracture sp sacroplasty by  on 07/06/2017 with complete relief of his sacral pain      Diagnosis:    1. Lumbar spondylosis     2. Facet hypertrophy of lumbar region     3. Lumbar facet arthropathy     4. Sacral insufficiency fracture, with routine healing, subsequent encounter           PLAN:     - I have stressed the importance of physical activity and a home exercise plan to help with pain and improve health.  - Patient can continue with medications for now since they are providing benefits, using them appropriately, and without side effects.  - Counseled patient regarding the importance of activity modification, constant sleeping habits and physical therapy.  - Schedule for a Radiofrequency ablation of the medial branches Bilaterally Right then  Left at  L3,4, and L5 for longer pain relief.  - I have explained the risks, benefits, and alternatives of the procedure in detail. The patient voices understanding and all questions have been answered. The patient agrees to proceed as planned. Written Consent obtained.    -RTC 2-3 weeks following procedure.  -The above plan and management options were discussed at length with patient. Patient is in agreement with the above and verbalized understanding. Dr. Haas   was consulted on this patient  and agrees with this plan.     PRICE Clark    10/04/2017

## 2017-10-04 ENCOUNTER — OFFICE VISIT (OUTPATIENT)
Dept: PAIN MEDICINE | Facility: CLINIC | Age: 82
End: 2017-10-04
Payer: MEDICARE

## 2017-10-04 VITALS
DIASTOLIC BLOOD PRESSURE: 73 MMHG | SYSTOLIC BLOOD PRESSURE: 113 MMHG | HEART RATE: 98 BPM | BODY MASS INDEX: 26.64 KG/M2 | WEIGHT: 196.44 LBS

## 2017-10-04 DIAGNOSIS — M47.816 LUMBAR FACET ARTHROPATHY: ICD-10-CM

## 2017-10-04 DIAGNOSIS — M47.816 LUMBAR SPONDYLOSIS: Primary | ICD-10-CM

## 2017-10-04 DIAGNOSIS — M84.48XD SACRAL INSUFFICIENCY FRACTURE, WITH ROUTINE HEALING, SUBSEQUENT ENCOUNTER: ICD-10-CM

## 2017-10-04 DIAGNOSIS — M47.816 FACET HYPERTROPHY OF LUMBAR REGION: ICD-10-CM

## 2017-10-04 PROCEDURE — 99999 PR PBB SHADOW E&M-EST. PATIENT-LVL III: CPT | Mod: PBBFAC,,, | Performed by: NURSE PRACTITIONER

## 2017-10-04 PROCEDURE — 99213 OFFICE O/P EST LOW 20 MIN: CPT | Mod: S$GLB,,, | Performed by: NURSE PRACTITIONER

## 2017-10-04 PROCEDURE — 99499 UNLISTED E&M SERVICE: CPT | Mod: S$GLB,,, | Performed by: NURSE PRACTITIONER

## 2017-10-06 NOTE — DISCHARGE INSTRUCTIONS
Home Care Instructions Pain Management:    1.  DIET:    You may resume your normal diet today.    2.  BATHING:    You may shower with luke warm water.    3.  DRESSING:    You may remove your bandage today.    4.  ACTIVITY LEVEL:      You may resume your normal activities 24 hours after your procedure.    5.  MEDICATIONS:    You may resume your normal medications today.    6.  SPECIAL INSTRUCTIONS:    No heat to the injection site for 24 hours including bath or shower, heating pad, moist heat or hot tubs.    Use an ice pack to the injection site for any pain or discomfort.  Apply ice packs for 20 minute intervals as needed.    If you have received any sedatives by mouth today, you can not drive for 12 hours.    If you have received sedation through an IV, you can not drive for 24 hours.    PLEASE CALL YOUR DOCTOR FOR THE FOLLOWIN.  Redness or swelling around the injection site.  2.  Fever of 101 degrees.  3.  Drainage (pus) from the injection site.  4.  For any continuous bleeding (some dried blood over the incision is normal.)    FOR EMERGENCIES:    If any unusual problems or difficulties occur during clinic hours, call [phone] or dial [phone].    Follow up with with your physician in 2-3 weeks.

## 2017-10-11 ENCOUNTER — SURGERY (OUTPATIENT)
Age: 82
End: 2017-10-11

## 2017-10-11 ENCOUNTER — HOSPITAL ENCOUNTER (OUTPATIENT)
Facility: HOSPITAL | Age: 82
Discharge: HOME | End: 2017-10-11
Attending: ANESTHESIOLOGY | Admitting: ANESTHESIOLOGY
Payer: MEDICARE

## 2017-10-11 VITALS
BODY MASS INDEX: 26.55 KG/M2 | RESPIRATION RATE: 16 BRPM | SYSTOLIC BLOOD PRESSURE: 148 MMHG | HEIGHT: 72 IN | HEART RATE: 79 BPM | TEMPERATURE: 98 F | OXYGEN SATURATION: 96 % | WEIGHT: 196 LBS | DIASTOLIC BLOOD PRESSURE: 66 MMHG

## 2017-10-11 PROCEDURE — 64636 DESTROY L/S FACET JNT ADDL: CPT | Performed by: ANESTHESIOLOGY

## 2017-10-11 PROCEDURE — 64636 DESTROY L/S FACET JNT ADDL: CPT | Mod: LT,,, | Performed by: ANESTHESIOLOGY

## 2017-10-11 PROCEDURE — 63600175 PHARM REV CODE 636 W HCPCS: Performed by: ANESTHESIOLOGY

## 2017-10-11 PROCEDURE — 64635 DESTROY LUMB/SAC FACET JNT: CPT | Performed by: ANESTHESIOLOGY

## 2017-10-11 PROCEDURE — 25000003 PHARM REV CODE 250: Performed by: ANESTHESIOLOGY

## 2017-10-11 PROCEDURE — 99152 MOD SED SAME PHYS/QHP 5/>YRS: CPT | Mod: ,,, | Performed by: ANESTHESIOLOGY

## 2017-10-11 PROCEDURE — 64635 DESTROY LUMB/SAC FACET JNT: CPT | Mod: LT,,, | Performed by: ANESTHESIOLOGY

## 2017-10-11 RX ORDER — FENTANYL CITRATE 50 UG/ML
INJECTION, SOLUTION INTRAMUSCULAR; INTRAVENOUS
Status: DISCONTINUED | OUTPATIENT
Start: 2017-10-11 | End: 2017-10-11 | Stop reason: HOSPADM

## 2017-10-11 RX ORDER — SODIUM CHLORIDE, SODIUM LACTATE, POTASSIUM CHLORIDE, CALCIUM CHLORIDE 600; 310; 30; 20 MG/100ML; MG/100ML; MG/100ML; MG/100ML
INJECTION, SOLUTION INTRAVENOUS CONTINUOUS
Status: DISCONTINUED | OUTPATIENT
Start: 2017-10-11 | End: 2017-10-11 | Stop reason: HOSPADM

## 2017-10-11 RX ORDER — MIDAZOLAM HYDROCHLORIDE 1 MG/ML
INJECTION, SOLUTION INTRAMUSCULAR; INTRAVENOUS
Status: DISCONTINUED | OUTPATIENT
Start: 2017-10-11 | End: 2017-10-11 | Stop reason: HOSPADM

## 2017-10-11 RX ORDER — TRIAMCINOLONE ACETONIDE 40 MG/ML
INJECTION, SUSPENSION INTRA-ARTICULAR; INTRAMUSCULAR
Status: DISCONTINUED | OUTPATIENT
Start: 2017-10-11 | End: 2017-10-11 | Stop reason: HOSPADM

## 2017-10-11 RX ORDER — LIDOCAINE HYDROCHLORIDE 10 MG/ML
INJECTION INFILTRATION; PERINEURAL
Status: DISCONTINUED | OUTPATIENT
Start: 2017-10-11 | End: 2017-10-11 | Stop reason: HOSPADM

## 2017-10-11 RX ORDER — BUPIVACAINE HYDROCHLORIDE 2.5 MG/ML
INJECTION, SOLUTION EPIDURAL; INFILTRATION; INTRACAUDAL
Status: DISCONTINUED | OUTPATIENT
Start: 2017-10-11 | End: 2017-10-11 | Stop reason: HOSPADM

## 2017-10-11 RX ADMIN — BUPIVACAINE HYDROCHLORIDE 5 ML: 2.5 INJECTION, SOLUTION EPIDURAL; INFILTRATION; INTRACAUDAL; PERINEURAL at 01:10

## 2017-10-11 RX ADMIN — TRIAMCINOLONE ACETONIDE 40 MG: 40 INJECTION, SUSPENSION INTRA-ARTICULAR; INTRAMUSCULAR at 01:10

## 2017-10-11 RX ADMIN — LIDOCAINE HYDROCHLORIDE 5 ML: 10 INJECTION, SOLUTION INFILTRATION; PERINEURAL at 01:10

## 2017-10-11 RX ADMIN — FENTANYL CITRATE 50 MCG: 50 INJECTION, SOLUTION INTRAMUSCULAR; INTRAVENOUS at 01:10

## 2017-10-11 RX ADMIN — MIDAZOLAM 2 MG: 1 INJECTION INTRAMUSCULAR; INTRAVENOUS at 01:10

## 2017-10-11 RX ADMIN — SODIUM CHLORIDE, SODIUM LACTATE, POTASSIUM CHLORIDE, AND CALCIUM CHLORIDE: .6; .31; .03; .02 INJECTION, SOLUTION INTRAVENOUS at 12:10

## 2017-10-11 NOTE — OP NOTE
Patient Name: Leonid Delacruz Jr.  MRN: [id number]    INFORMED CONSENT: The procedure, risks, benefits and options were discussed with patient. There are no contraindications to the procedure. The patient expressed understanding and agreed to proceed. The personnel performing the procedure was discussed. I verify that I personally obtained Leonid's consent prior to the start of the procedure and the signed consent can be found on the patient's chart.    PROCEDURE: LEFT L3,4,5  FACET MEDIAL BRANCH NERVE RADIOFREQUENCY NEUROTOMY (lumbar)    Procedure Date: 10/11/2017  Anesthesia:  local  Pre Procedure diagnosis:   LUMBAR SPONDYLOSIS  Post-Procedure diagnosis:   Same    Sedation: Yes - Fentanyl 100 mcg and Midazolam 2 mg    DESCRIPTION OF PROCEDURE: The patient was brought to the procedure room.  IV access was obtained prior to the procedure. The patient was positioned prone on the fluoroscopy table. Continuous hemodynamic monitoring was initiated including blood pressure and pulse oximetry. IV sedation was administered incrementally to allow the patient to remain comfortable and conversant throughout the procedure. The area of the lumbar spine was prepped chlorhexidine three times and draped into a sterile field.  Fluoroscopy was used to identify the location of the left  L3, L4, and L5 medial branch nerves at the junctions of the superior articular process and the transverse processes of left  L4, L5, and the sacral ala respectively.  Skin anesthesia was achieved using 5 cc of Lidocaine 0.5% over the injection sites. A 20 gauge, 100mm (10mm active tip) curved RF needle was slowly inserted at each level using AP, lateral and oblique fluoroscopic imaging. Negative aspiration for blood or CSF was confirmed.  Sensory stimulation at 50Hz below 0.5V was achieved at every level. Motor stimulation at 2Hz up to 1.5V did not cause any radicular symptoms at any level. Each level was anesthetized with 1.5 cc of lidocaine 1%.(total  5 cc of lidocaine1%)  Radiofrequency lesioning was performed for 90 seconds at 80 degrees in two different positions at each level.  At each level, 10 mg of Kenalog and 1.5 cc of bupivacaine 0.25% was injected. The needles were removed and bleeding was nil.  A sterile dressing was applied. Leonid was taken back to the recovery room for further observation.       Blood Loss: Nill  Specimen: None    Pre Procedure Pain Level: 6/10  Post-Procedure Pain Level: 0/10      Discharge Diagnosis: Same as Pre and Post Procedure  Condition on Discharge: Stable.  Diet on Discharge: Same as before.  Activity: as per instruction sheet.  Discharge to: Home with a responsible adult.  Follow up: as per Discharge instructions

## 2017-10-27 NOTE — DISCHARGE INSTRUCTIONS
Home Care Instructions Pain Management:    1.  DIET:    You may resume your normal diet today.    2.  BATHING:    You may shower with luke warm water.    3.  DRESSING:    You may remove your bandage today.    4.  ACTIVITY LEVEL:      You may resume your normal activities 24 hours after your procedure.    5.  MEDICATIONS:    You may resume your normal medications today.    6.  SPECIAL INSTRUCTIONS:    No heat to the injection site for 24 hours including bath or shower, heating pad, moist heat or hot tubs.    Use an ice pack to the injection site for any pain or discomfort.  Apply ice packs for 20 minute intervals as needed.    If you have received any sedatives by mouth today, you can not drive for 12 hours.    If you have received sedation through an IV, you can not drive for 24 hours.    PLEASE CALL YOUR DOCTOR FOR THE FOLLOWIN.  Redness or swelling around the injection site.  2.  Fever of 101 degrees.  3.  Drainage (pus) from the injection site.  4.  For any continuous bleeding (some dried blood over the incision is normal.)    FOR EMERGENCIES:    If any unusual problems or difficulties occur during clinic hours, call [phone] or dial [phone].    Follow up with with your physician in 2-3 weeks.         Procedural Sedation  Procedural sedation is medicine to ease discomfort, pain, and anxiety during a procedure. The medicine is often given through an IV (intravenous) line in your arm or hand. In some cases, the medicine may be taken by mouth or inhaled. While you are under sedation, you will likely be awake. But you may not remember anything afterward.  Why procedural sedation is used  Sedation is used for many types of procedures. The goal is to reduce pain, anxiety, and stressful memories of a procedure. It can also help your healthcare provider treat you. For example, having a broken bone fixed may be easier if you feel relaxed.  Procedural sedation is used only for short, basic procedures. It is not  used for complex surgeries. Some procedures that use this type of sedation include:  · Dental surgery  · Breast biopsy, to take a sample of breast tissue  · Endoscopy, to look at gastrointestinal problems  · Bronchoscopy, to check for lung problems  · Bone or joint realignment, to fix a broken bone or dislocated joint  · Minor foot or skin surgery  · Electrical cardioversion, to restore a normal heart rhythm  · Lumbar puncture, to assess neurological disease  Risks of procedural sedation  Procedural sedation has some risks and possible side effects, such as:  · Headache  · Nausea and vomiting  · Unpleasant memory of the procedure  · Lowered rate of breathing  · Changes in heart rate and blood pressure (rare)  · Inhalation of stomach contents into your lungs (rare)  Side effects will likely go away shortly after the procedure. Your healthcare team will watch your heart rate and breathing during your sedation. This is to help prevent problems.  Your own risks may vary based on your age and your overall health. They also depend on the type of sedation you are given. Talk with your healthcare provider about the risks that apply most to you.  Getting ready for procedural sedation  Talk with your healthcare provider about how to get ready for your procedure. Tell him or her about all the medicines you take. This includes over-the-counter medicines such as ibuprofen. It also includes vitamins, herbs, and other supplements. You may need to stop taking some medicines before the procedure, such as blood thinners and aspirin. If you smoke, you should stop to lessen the chance of a lung issue. Talk with your healthcare provider if you need help to stop smoking.  Tell your healthcare provider if you:  · Have had any problems in the past with sedation or anesthesia  · Have had any recent changes in your health, such as an infection or fever  · Are pregnant or think you may be pregnant  Also be sure to:  · Ask a family member or  friend to take you home after the procedure. You cant drive on the day you receive sedation.  · Not eat or drink after midnight the night before your procedure, if advised.  · Not plan on making any important decisions, such as financial or legal, for the day after you receive the sedation.  · Follow all other instructions from your healthcare provider.  During your procedural sedation  You may have your procedure in a hospital or a medical clinic. Sedation is done by a trained healthcare provider. In general, you can expect the following:  · You will be given medicine through an IV line in your arm or hand. Or you may receive a shot. The medicine may also be given by mouth. Or you may inhale it through a mask.  · If you receive medicine through an IV, you may feel the effects very quickly. You will start to feel relaxed and drowsy.  · During the procedure, your heart rate, breathing, and blood pressure will be closely watched. Your breathing and blood pressure may decrease a little. But you will likely not need help with your breathing. You may receive a little extra oxygen through a mask or through some soft plastic prongs underneath your nose.  · You will probably be awake the entire time. If you do fall asleep, you should be easy to wake up, if needed. You should feel little or no pain.  · When your procedure is over, the sedative medicine will be stopped.  After your procedural sedation  You will begin to feel more awake and aware. But you will likely be drowsy for a while afterward. You will be closely watched as you become more alert. You may have a faint memory of the procedure. Or you may not remember it at all.  You should be able to return home within an hour or two after your procedure. Plan to have someone stay with you for a few hours. Side effects such as headache and nausea may go away quickly. Tell your healthcare provider if they continue.  Dont drive or make any important decisions for at least  24 hours. Be sure to follow all after-care instructions.     When to call your healthcare provider  Have someone call your healthcare provider right away if you have any of these:  · Drowsiness that gets worse  · Weakness or dizziness that gets worse  · Repeated vomiting  · You cant be awakened  · Severe or ongoing pain from the procedure, not relieved by the pain medicine   Date Last Reviewed: 2/1/2017  © 8055-0435 [employer]. [address] [place] [address], [place] [address]. All rights reserved. This information is not intended as a substitute for professional medical care. Always follow your healthcare professional's instructions.

## 2017-10-31 ENCOUNTER — TELEPHONE (OUTPATIENT)
Dept: PAIN MEDICINE | Facility: HOSPITAL | Age: 82
End: 2017-10-31

## 2017-11-01 ENCOUNTER — SURGERY (OUTPATIENT)
Age: 82
End: 2017-11-01

## 2017-11-01 ENCOUNTER — HOSPITAL ENCOUNTER (OUTPATIENT)
Facility: HOSPITAL | Age: 82
Discharge: HOME | End: 2017-11-01
Attending: ANESTHESIOLOGY | Admitting: ANESTHESIOLOGY
Payer: MEDICARE

## 2017-11-01 VITALS
HEIGHT: 72 IN | BODY MASS INDEX: 26.55 KG/M2 | TEMPERATURE: 98 F | RESPIRATION RATE: 14 BRPM | HEART RATE: 78 BPM | DIASTOLIC BLOOD PRESSURE: 67 MMHG | SYSTOLIC BLOOD PRESSURE: 128 MMHG | OXYGEN SATURATION: 96 % | WEIGHT: 196 LBS

## 2017-11-01 PROCEDURE — 64635 DESTROY LUMB/SAC FACET JNT: CPT | Performed by: ANESTHESIOLOGY

## 2017-11-01 PROCEDURE — 64636 DESTROY L/S FACET JNT ADDL: CPT | Mod: RT,,, | Performed by: ANESTHESIOLOGY

## 2017-11-01 PROCEDURE — 64636 DESTROY L/S FACET JNT ADDL: CPT | Performed by: ANESTHESIOLOGY

## 2017-11-01 PROCEDURE — 63600175 PHARM REV CODE 636 W HCPCS: Performed by: ANESTHESIOLOGY

## 2017-11-01 PROCEDURE — 99152 MOD SED SAME PHYS/QHP 5/>YRS: CPT | Mod: ,,, | Performed by: ANESTHESIOLOGY

## 2017-11-01 PROCEDURE — 64635 DESTROY LUMB/SAC FACET JNT: CPT | Mod: RT,,, | Performed by: ANESTHESIOLOGY

## 2017-11-01 PROCEDURE — 25000003 PHARM REV CODE 250: Performed by: ANESTHESIOLOGY

## 2017-11-01 RX ORDER — SODIUM CHLORIDE, SODIUM LACTATE, POTASSIUM CHLORIDE, CALCIUM CHLORIDE 600; 310; 30; 20 MG/100ML; MG/100ML; MG/100ML; MG/100ML
INJECTION, SOLUTION INTRAVENOUS CONTINUOUS
Status: DISCONTINUED | OUTPATIENT
Start: 2017-11-01 | End: 2017-11-01 | Stop reason: HOSPADM

## 2017-11-01 RX ORDER — LIDOCAINE HYDROCHLORIDE 10 MG/ML
INJECTION, SOLUTION EPIDURAL; INFILTRATION; INTRACAUDAL; PERINEURAL
Status: DISCONTINUED | OUTPATIENT
Start: 2017-11-01 | End: 2017-11-01 | Stop reason: HOSPADM

## 2017-11-01 RX ORDER — FENTANYL CITRATE 50 UG/ML
INJECTION, SOLUTION INTRAMUSCULAR; INTRAVENOUS
Status: DISCONTINUED | OUTPATIENT
Start: 2017-11-01 | End: 2017-11-01 | Stop reason: HOSPADM

## 2017-11-01 RX ORDER — BUPIVACAINE HYDROCHLORIDE 2.5 MG/ML
INJECTION, SOLUTION EPIDURAL; INFILTRATION; INTRACAUDAL
Status: DISCONTINUED | OUTPATIENT
Start: 2017-11-01 | End: 2017-11-01 | Stop reason: HOSPADM

## 2017-11-01 RX ORDER — TRIAMCINOLONE ACETONIDE 40 MG/ML
INJECTION, SUSPENSION INTRA-ARTICULAR; INTRAMUSCULAR
Status: DISCONTINUED | OUTPATIENT
Start: 2017-11-01 | End: 2017-11-01 | Stop reason: HOSPADM

## 2017-11-01 RX ORDER — MIDAZOLAM HYDROCHLORIDE 1 MG/ML
INJECTION INTRAMUSCULAR; INTRAVENOUS
Status: DISCONTINUED | OUTPATIENT
Start: 2017-11-01 | End: 2017-11-01 | Stop reason: HOSPADM

## 2017-11-01 RX ADMIN — FENTANYL CITRATE 50 MCG: 50 INJECTION, SOLUTION INTRAMUSCULAR; INTRAVENOUS at 01:11

## 2017-11-01 RX ADMIN — LIDOCAINE HYDROCHLORIDE 5 ML: 10 INJECTION, SOLUTION EPIDURAL; INFILTRATION; INTRACAUDAL; PERINEURAL at 01:11

## 2017-11-01 RX ADMIN — MIDAZOLAM HYDROCHLORIDE 1 MG: 1 INJECTION INTRAMUSCULAR; INTRAVENOUS at 01:11

## 2017-11-01 RX ADMIN — TRIAMCINOLONE ACETONIDE 40 MG: 40 INJECTION, SUSPENSION INTRA-ARTICULAR; INTRAMUSCULAR at 01:11

## 2017-11-01 RX ADMIN — SODIUM CHLORIDE, SODIUM LACTATE, POTASSIUM CHLORIDE, AND CALCIUM CHLORIDE 75 ML/HR: .6; .31; .03; .02 INJECTION, SOLUTION INTRAVENOUS at 01:11

## 2017-11-01 RX ADMIN — BUPIVACAINE HYDROCHLORIDE 5 ML: 2.5 INJECTION, SOLUTION EPIDURAL; INFILTRATION; INTRACAUDAL; PERINEURAL at 01:11

## 2017-11-01 NOTE — OP NOTE
Patient Name: Leonid Delacruz Jr.  MRN: [id number]    INFORMED CONSENT: The procedure, risks, benefits and options were discussed with patient. There are no contraindications to the procedure. The patient expressed understanding and agreed to proceed. The personnel performing the procedure was discussed. I verify that I personally obtained Leonid's consent prior to the start of the procedure and the signed consent can be found on the patient's chart.    PROCEDURE: Right L3,4, 5 FACET MEDIAL BRANCH NERVE RADIOFREQUENCY NEUROTOMY (lumbar)    Procedure Date: 11/1/2017  Anesthesia:  systemic  Pre Procedure diagnosis:   Lumbar spondylosis   Post-Procedure diagnosis:   Same    Sedation: Yes - Fentanyl 100 mcg and Midazolam 2 mg    DESCRIPTION OF PROCEDURE: The patient was brought to the procedure room.  IV access was obtained prior to the procedure. The patient was positioned prone on the fluoroscopy table. Continuous hemodynamic monitoring was initiated including blood pressure and pulse oximetry. IV sedation was administered incrementally to allow the patient to remain comfortable and conversant throughout the procedure. The area of the lumbar spine was prepped chlorhexidine three times and draped into a sterile field.  Fluoroscopy was used to identify the location of the right  L3, L4, and L5 medial branch nerves at the junctions of the superior articular process and the transverse processes of L4, L5, and the sacral ala respectively.  Skin anesthesia was achieved using 5 cc of Lidocaine 0.5% over the injection sites. A 20 gauge, 100mm (10mm active tip) curved RF needle was slowly inserted at each level using AP, lateral and oblique fluoroscopic imaging. Negative aspiration for blood or CSF was confirmed.  Sensory stimulation at 50Hz below 0.5V was achieved at every level. Motor stimulation at 2Hz up to 1.5V did not cause any radicular symptoms at any level. Each level was anesthetized with 1.5 cc of lidocaine 1%.(total  5 cc of lidocaine1%)  Radiofrequency lesioning was performed for 90 seconds at 80 degrees in two different positions at each level.  At each level, 10 mg of Kenalog and 1.5 cc of bupivacaine 0.25% was injected. The needles were removed and bleeding was nil.  A sterile dressing was applied. Leonid was taken back to the recovery room for further observation.       Blood Loss: Nill  Specimen: None    Pre Procedure Pain Level: 9/10  Post-Procedure Pain Level: 0/10      Discharge Diagnosis: Same as Pre and Post Procedure  Condition on Discharge: Stable.  Diet on Discharge: Same as before.  Activity: as per instruction sheet.  Discharge to: Home with a responsible adult.  Follow up: as per Discharge instructions

## 2017-11-07 RX ORDER — TRAMADOL HYDROCHLORIDE 50 MG/1
TABLET ORAL
Qty: 60 TABLET | Refills: 3 | Status: SHIPPED | OUTPATIENT
Start: 2017-11-07 | End: 2018-04-25 | Stop reason: SDUPTHER

## 2017-11-14 ENCOUNTER — HOSPITAL ENCOUNTER (EMERGENCY)
Facility: HOSPITAL | Age: 82
Discharge: HOME | End: 2017-11-14
Attending: EMERGENCY MEDICINE
Payer: MEDICARE

## 2017-11-14 VITALS
TEMPERATURE: 99 F | DIASTOLIC BLOOD PRESSURE: 66 MMHG | BODY MASS INDEX: 26.55 KG/M2 | WEIGHT: 196 LBS | SYSTOLIC BLOOD PRESSURE: 153 MMHG | OXYGEN SATURATION: 100 % | RESPIRATION RATE: 18 BRPM | HEART RATE: 93 BPM | HEIGHT: 72 IN

## 2017-11-14 DIAGNOSIS — S82.892A CLOSED FRACTURE DISLOCATION OF LEFT ANKLE, INITIAL ENCOUNTER: Primary | ICD-10-CM

## 2017-11-14 DIAGNOSIS — S82.892A CLOSED LEFT ANKLE FRACTURE: ICD-10-CM
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
## 2017-11-14 DIAGNOSIS — S82.892A: ICD-10-CM

## 2017-11-14 DIAGNOSIS — Q89.9 DEFORMITY: ICD-10-CM

## 2017-11-14 PROCEDURE — 63600175 PHARM REV CODE 636 W HCPCS: Performed by: EMERGENCY MEDICINE

## 2017-11-14 PROCEDURE — 27788 TREATMENT OF ANKLE FRACTURE: CPT | Mod: LT

## 2017-11-14 PROCEDURE — 99285 EMERGENCY DEPT VISIT HI MDM: CPT | Mod: 25

## 2017-11-14 RX ORDER — PROPOFOL 10 MG/ML
100 VIAL (ML) INTRAVENOUS ONCE
Status: COMPLETED | OUTPATIENT
Start: 2017-11-14 | End: 2017-11-14

## 2017-11-14 RX ORDER — OXYCODONE AND ACETAMINOPHEN 5; 325 MG/1; MG/1
1 TABLET ORAL EVERY 6 HOURS PRN
Qty: 15 TABLET | Refills: 0 | Status: SHIPPED | OUTPATIENT
Start: 2017-11-14 | End: 2018-04-25

## 2017-11-14 RX ORDER — FENTANYL CITRATE 50 UG/ML
50 INJECTION, SOLUTION INTRAMUSCULAR; INTRAVENOUS
Status: COMPLETED | OUTPATIENT
Start: 2017-11-14 | End: 2017-11-14

## 2017-11-14 RX ADMIN — FENTANYL CITRATE 50 MCG: 50 INJECTION INTRAMUSCULAR; INTRAVENOUS at 04:11

## 2017-11-14 RX ADMIN — PROPOFOL 100 MG: 10 INJECTION, EMULSION INTRAVENOUS at 04:11

## 2017-11-14 NOTE — ED NOTES
Patient has verified the spelling of their name and  on armband  LOC: The patient is awake, alert, and aware of environment with an appropriate affect, the patient is oriented x 4 and speaking appropriately.   APPEARANCE: Patient resting comfortably and in no acute distress, patient is clean and well groomed, patient's clothing is properly fastened.   SKIN: The skin is warm and dry, color consistent with ethnicity, patient has normal skin turgor and moist mucus membranes,    : Voids without difficulty  MUSCULOSKELETAL: pt left ankle with large amt of swelling noted. Pt able to move left foot + 1 dorsalis pedis pulse noted   RESPIRATORY: Airway is open and patent, respirations are spontaneous, patient has a normal effort and rate, no accessory muscle use noted, bilateral breath sounds clear, denies SOB   ABDOMEN: Soft and non tender to palpation, no distention noted, normoactive bowel sounds present in all four quadrants.   CARDIAC: Normal rate and rhythm, no peripheral edema noted, less then 3 second capillary refill, denies chest pain  COMPLAINT: pt states he fell and twisted his ankle on today

## 2017-11-15 ENCOUNTER — CLINICAL SUPPORT (OUTPATIENT)
Dept: LAB | Facility: HOSPITAL | Age: 82
End: 2017-11-15
Attending: ORTHOPAEDIC SURGERY
Payer: MEDICARE

## 2017-11-15 DIAGNOSIS — S82.892A FRACTURE OF LEFT ANKLE: ICD-10-CM

## 2017-11-15 DIAGNOSIS — S82.892A FRACTURE OF LEFT ANKLE: Primary | ICD-10-CM

## 2017-11-15 DIAGNOSIS — Z01.818 PREOP EXAMINATION: ICD-10-CM

## 2017-11-15 PROCEDURE — 93005 ELECTROCARDIOGRAM TRACING: CPT

## 2017-11-15 PROCEDURE — 93010 EKG 12-LEAD: ICD-10-PCS | Mod: ,,, | Performed by: INTERNAL MEDICINE

## 2017-11-15 PROCEDURE — 93010 ELECTROCARDIOGRAM REPORT: CPT | Mod: ,,, | Performed by: INTERNAL MEDICINE

## 2017-11-15 NOTE — H&P
S: Patient stepped wrong getting into truck yesterday and twisted his L ankle. He was dx with an ankle fracture and was reduced and splinted in the ER. Denies other injuries.     Past Medical History:   Diagnosis Date    Amblyopia, strabismic - Left Eye 1/7/2014    Anxiety     AR (allergic rhinitis)     Cataract     COPD (chronic obstructive pulmonary disease)     Gastroesophageal reflux disease with hiatal hernia     History of bleeding peptic ulcer 5/02/2010    History of gastritis 9/22/2009    with hemorrhage    HTN (hypertension), benign 6/14/2017    MDD (major depressive disorder), recurrent episode, moderate 3/3/2016    Osteoarthritis     PUD (peptic ulcer disease)     stomach     Past Surgical History:   Procedure Laterality Date    APPENDECTOMY      CATARACT EXTRACTION W/  INTRAOCULAR LENS IMPLANT  8/20/2001, 10/01/2001    COLONOSCOPY  6/06/2011    ESOPHAGOGASTRODUODENOSCOPY  5/02/2010, 8/11/2010, 3/21/2011, 6/06/2011    LAMINOTOMY  5/20/2003    Left L4-L5    PENILE PROSTHESIS IMPLANT      ROTATOR CUFF REPAIR      Bilateral    TONSILLECTOMY, ADENOIDECTOMY      VASECTOMY       No current facility-administered medications for this encounter.     Current Outpatient Prescriptions:     albuterol (PROAIR HFA) 90 mcg/actuation inhaler, 2 HFA Aerosol Inhaler Inhalation Four times a day p;rn, Disp: 3 Inhaler, Rfl: 3    alendronate (FOSAMAX) 10 MG Tab, , Disp: , Rfl:     amlodipine (NORVASC) 5 MG tablet, Take 1 tablet (5 mg total) by mouth once daily., Disp: 30 tablet, Rfl: 11    aspirin 81 MG Chew, Take 81 mg by mouth once daily., Disp: , Rfl:     calcium carbonate (OS-JOANA) 500 mg calcium (1,250 mg) tablet, Take 1 tablet (500 mg total) by mouth 2 (two) times daily., Disp: , Rfl: 0    celecoxib (CELEBREX) 200 MG capsule, TAKE 1 CAPSULE(200 MG) BY MOUTH TWICE DAILY, Disp: 60 capsule, Rfl: 0    cholecalciferol, vitamin D3, 400 unit Cap, Take 1 capsule (400 Units total) by mouth 2 (two) times  daily., Disp: , Rfl: 0    omeprazole (PRILOSEC) 40 MG capsule, TAKE 1 CAPSULE BY MOUTH TWICE DAILY BEFORE MEALS, Disp: 60 capsule, Rfl: 5    oxyCODONE-acetaminophen (PERCOCET) 5-325 mg per tablet, Take 1 tablet by mouth every 6 (six) hours as needed for Pain., Disp: 15 tablet, Rfl: 0    sertraline (ZOLOFT) 100 MG tablet, TAKE ONE TABLET BY MOUTH ONCE DAILY., Disp: 90 tablet, Rfl: 3    SYMBICORT 160-4.5 mcg/actuation HFAA, INHALE 2 PUFFS INTO LUNGS EVERY 12 HOURS, Disp: 10.2 g, Rfl: 0    SYMBICORT 160-4.5 mcg/actuation HFAA, INHALE 2 PUFFS BY MOUTH TWICE DAILY, Disp: 10.2 g, Rfl: 0    tamsulosin (FLOMAX) 0.4 mg Cp24, TAKE 1 CAPSULE BY MOUTH DAILY, Disp: 90 capsule, Rfl: 3    traMADol (ULTRAM) 50 mg tablet, TAKE ONE TO TWO TABLETS BY MOUTH EVERY 8 HOURS AS NEEDED., Disp: 60 tablet, Rfl: 3  Review of patient's allergies indicates:  No Known Allergies     O:       Vitals:     11/14/17 1705   BP: 129/61   Pulse: 81   Resp: 19   Temp:        No results for input(s): WBC, HGB, HCT, PLT in the last 72 hours.  No results for input(s): NA, K, CL, CO2, HCO3C, BUN, LABCREA, GLU in the last 72 hours.  No results for input(s): CRP in the last 72 hours.     Invalid input(s): ESR     PE:  Gen: A+Ox3, NAD  Card: RRR by RP  Lungs: nonlabored breathing, symmetric chest rise  Abd: S/NT/ND     LLE:  Hip ROM: 0-120   Knee ROM: 0-120  Negative lachman, posterior drawer, stable to varus/valgus stress  Short leg splint with sturrips applied. Able to wiggle toes  1+ DP     Imaging: Bimall equivalent ankle fracture dislocation.   Post reductions with mortise intact, reduced appropriately.      A/P:  83yM with L bimall equivalent ankle fracture  - Discussed all treatment options with the patient, would like to proceed with surgery.   - Plan for ORIF lateral mall with possible syndesmotic fixations.     There are no interval changes to report in the history and physical exam.

## 2017-11-15 NOTE — CONSULTS
[facility] Consult    Orthopaedic Injuries:  Left bimall equivalent ankle fracture      S: Patient stepped wrong getting into truck today and twisted his L ankle. He felt a pop and saw a deformity. He presented to the ER and was diagnosed with L ankle fracture. No other injuries noted.     O:   Vitals:    11/14/17 1705   BP: 129/61   Pulse: 81   Resp: 19   Temp:      No results for input(s): WBC, HGB, HCT, PLT in the last 72 hours.  No results for input(s): NA, K, CL, CO2, HCO3C, BUN, LABCREA, GLU in the last 72 hours.  No results for input(s): CRP in the last 72 hours.    Invalid input(s): ESR    PE:  Gen: A+Ox3, NAD  Card: RRR by RP  Lungs: nonlabored breathing, symmetric chest rise  Abd: S/NT/ND    LLE:  No TTP, minimal swelling present  No signs of trauma, abrasions  Unstable ankle fracture readily apparent  Hip ROM: 0-120   Knee ROM: 0-120  Negative lachman, posterior drawer, stable to varus/valgus stress  5/5 EHL/FHL/GS/TA/HF/KE  SLT S/S/SP/DP/T  1+ DP and PT    Imaging: Bimall equivalent ankle fracture dislocation.   Post reductions with mortise intact, reduced appropriately.     A/P:  83yM with L bimall equivalent ankle fracture  - Reduced and splinted in the ER, appropriate reduction achieved  - Maintain splint and NWB to LLE  - F/u with Dr. Han tomorrow. Given number for clinic.   - Recommend ASA for dvt ppx if he can tolerate it.

## 2017-11-16 ENCOUNTER — HOSPITAL ENCOUNTER (OUTPATIENT)
Facility: HOSPITAL | Age: 82
Discharge: HOME | End: 2017-11-16
Attending: ORTHOPAEDIC SURGERY | Admitting: ORTHOPAEDIC SURGERY
Payer: MEDICARE

## 2017-11-16 ENCOUNTER — ANESTHESIA (OUTPATIENT)
Dept: SURGERY | Facility: HOSPITAL | Age: 82
End: 2017-11-16
Payer: MEDICARE

## 2017-11-16 ENCOUNTER — ANESTHESIA EVENT (OUTPATIENT)
Dept: SURGERY | Facility: HOSPITAL | Age: 82
End: 2017-11-16
Payer: MEDICARE

## 2017-11-16 VITALS
WEIGHT: 196 LBS | OXYGEN SATURATION: 95 % | HEIGHT: 72 IN | RESPIRATION RATE: 18 BRPM | HEART RATE: 70 BPM | TEMPERATURE: 97 F | SYSTOLIC BLOOD PRESSURE: 124 MMHG | BODY MASS INDEX: 26.55 KG/M2 | DIASTOLIC BLOOD PRESSURE: 62 MMHG

## 2017-11-16 DIAGNOSIS — S82.892A CLOSED LEFT ANKLE FRACTURE: Primary | ICD-10-CM

## 2017-11-16 PROCEDURE — 36000709 HC OR TIME LEV III EA ADD 15 MIN: Performed by: ORTHOPAEDIC SURGERY

## 2017-11-16 PROCEDURE — 27201423 OPTIME MED/SURG SUP & DEVICES STERILE SUPPLY: Performed by: ORTHOPAEDIC SURGERY

## 2017-11-16 PROCEDURE — C1713 ANCHOR/SCREW BN/BN,TIS/BN: HCPCS | Performed by: ORTHOPAEDIC SURGERY

## 2017-11-16 PROCEDURE — 63600175 PHARM REV CODE 636 W HCPCS: Performed by: ORTHOPAEDIC SURGERY

## 2017-11-16 PROCEDURE — 76942 ECHO GUIDE FOR BIOPSY: CPT | Performed by: ANESTHESIOLOGY

## 2017-11-16 PROCEDURE — 25000003 PHARM REV CODE 250: Performed by: NURSE ANESTHETIST, CERTIFIED REGISTERED

## 2017-11-16 PROCEDURE — 71000015 HC POSTOP RECOV 1ST HR: Performed by: ORTHOPAEDIC SURGERY

## 2017-11-16 PROCEDURE — 36000708 HC OR TIME LEV III 1ST 15 MIN: Performed by: ORTHOPAEDIC SURGERY

## 2017-11-16 PROCEDURE — 37000009 HC ANESTHESIA EA ADD 15 MINS: Performed by: ORTHOPAEDIC SURGERY

## 2017-11-16 PROCEDURE — 63600175 PHARM REV CODE 636 W HCPCS: Performed by: NURSE ANESTHETIST, CERTIFIED REGISTERED

## 2017-11-16 PROCEDURE — 71000016 HC POSTOP RECOV ADDL HR: Performed by: ORTHOPAEDIC SURGERY

## 2017-11-16 PROCEDURE — C1769 GUIDE WIRE: HCPCS | Performed by: ORTHOPAEDIC SURGERY

## 2017-11-16 PROCEDURE — 37000008 HC ANESTHESIA 1ST 15 MINUTES: Performed by: ORTHOPAEDIC SURGERY

## 2017-11-16 DEVICE — IMPLANTABLE DEVICE: Type: IMPLANTABLE DEVICE | Site: FOOT | Status: FUNCTIONAL

## 2017-11-16 DEVICE — SCREW 2.7MM 28MM SELF-TAPPING: Type: IMPLANTABLE DEVICE | Site: FOOT | Status: FUNCTIONAL

## 2017-11-16 DEVICE — SCREW 2.7X14MM: Type: IMPLANTABLE DEVICE | Site: FOOT | Status: FUNCTIONAL

## 2017-11-16 DEVICE — SCREW CORTEX 3.5MM X 12MM: Type: IMPLANTABLE DEVICE | Site: FOOT | Status: FUNCTIONAL

## 2017-11-16 DEVICE — SCREW CORTEX 3.5MM X 14MM.: Type: IMPLANTABLE DEVICE | Site: FOOT | Status: FUNCTIONAL

## 2017-11-16 DEVICE — SCREW STRDRV REC T8 2.7X8 SS: Type: IMPLANTABLE DEVICE | Site: FOOT | Status: FUNCTIONAL

## 2017-11-16 DEVICE — SCREW STRDRV REC T8 2.7X12 SS: Type: IMPLANTABLE DEVICE | Site: FOOT | Status: FUNCTIONAL

## 2017-11-16 DEVICE — SCREW 2.7X16MM: Type: IMPLANTABLE DEVICE | Site: FOOT | Status: FUNCTIONAL

## 2017-11-16 RX ORDER — DIPHENHYDRAMINE HYDROCHLORIDE 50 MG/ML
12.5 INJECTION INTRAMUSCULAR; INTRAVENOUS EVERY 6 HOURS PRN
Status: DISCONTINUED | OUTPATIENT
Start: 2017-11-16 | End: 2017-11-16 | Stop reason: HOSPADM

## 2017-11-16 RX ORDER — HYDROMORPHONE HYDROCHLORIDE 2 MG/ML
0.5 INJECTION, SOLUTION INTRAMUSCULAR; INTRAVENOUS; SUBCUTANEOUS EVERY 5 MIN PRN
Status: DISCONTINUED | OUTPATIENT
Start: 2017-11-16 | End: 2017-11-16 | Stop reason: HOSPADM

## 2017-11-16 RX ORDER — MIDAZOLAM HYDROCHLORIDE 1 MG/ML
INJECTION, SOLUTION INTRAMUSCULAR; INTRAVENOUS
Status: DISCONTINUED | OUTPATIENT
Start: 2017-11-16 | End: 2017-11-16

## 2017-11-16 RX ORDER — SODIUM CHLORIDE 0.9 % (FLUSH) 0.9 %
3 SYRINGE (ML) INJECTION
Status: DISCONTINUED | OUTPATIENT
Start: 2017-11-16 | End: 2017-11-16 | Stop reason: SDUPTHER

## 2017-11-16 RX ORDER — DIPHENHYDRAMINE HYDROCHLORIDE 50 MG/ML
12.5 INJECTION INTRAMUSCULAR; INTRAVENOUS EVERY 6 HOURS PRN
Status: DISCONTINUED | OUTPATIENT
Start: 2017-11-16 | End: 2017-11-16 | Stop reason: SDUPTHER

## 2017-11-16 RX ORDER — CEFAZOLIN SODIUM 2 G/50ML
2 SOLUTION INTRAVENOUS ONCE
Status: COMPLETED | OUTPATIENT
Start: 2017-11-16 | End: 2017-11-16

## 2017-11-16 RX ORDER — ONDANSETRON 8 MG/1
8 TABLET, ORALLY DISINTEGRATING ORAL EVERY 8 HOURS PRN
Status: DISCONTINUED | OUTPATIENT
Start: 2017-11-16 | End: 2017-11-16 | Stop reason: HOSPADM

## 2017-11-16 RX ORDER — PROPOFOL 10 MG/ML
VIAL (ML) INTRAVENOUS
Status: DISCONTINUED | OUTPATIENT
Start: 2017-11-16 | End: 2017-11-16

## 2017-11-16 RX ORDER — SODIUM CHLORIDE 9 MG/ML
INJECTION, SOLUTION INTRAVENOUS CONTINUOUS PRN
Status: DISCONTINUED | OUTPATIENT
Start: 2017-11-16 | End: 2017-11-16

## 2017-11-16 RX ORDER — HYDROCODONE BITARTRATE AND ACETAMINOPHEN 5; 325 MG/1; MG/1
1 TABLET ORAL EVERY 4 HOURS PRN
Status: DISCONTINUED | OUTPATIENT
Start: 2017-11-16 | End: 2017-11-16 | Stop reason: HOSPADM

## 2017-11-16 RX ORDER — ONDANSETRON 2 MG/ML
4 INJECTION INTRAMUSCULAR; INTRAVENOUS DAILY PRN
Status: DISCONTINUED | OUTPATIENT
Start: 2017-11-16 | End: 2017-11-16 | Stop reason: HOSPADM

## 2017-11-16 RX ORDER — HYDROCODONE BITARTRATE AND ACETAMINOPHEN 5; 325 MG/1; MG/1
1 TABLET ORAL EVERY 6 HOURS PRN
Qty: 28 TABLET | Refills: 0 | Status: SHIPPED | OUTPATIENT
Start: 2017-11-16 | End: 2018-04-25

## 2017-11-16 RX ORDER — HYDROMORPHONE HYDROCHLORIDE 2 MG/ML
0.5 INJECTION, SOLUTION INTRAMUSCULAR; INTRAVENOUS; SUBCUTANEOUS EVERY 5 MIN PRN
Status: DISCONTINUED | OUTPATIENT
Start: 2017-11-16 | End: 2017-11-16 | Stop reason: SDUPTHER

## 2017-11-16 RX ORDER — ONDANSETRON 2 MG/ML
4 INJECTION INTRAMUSCULAR; INTRAVENOUS DAILY PRN
Status: DISCONTINUED | OUTPATIENT
Start: 2017-11-16 | End: 2017-11-16 | Stop reason: SDUPTHER

## 2017-11-16 RX ORDER — HYDROMORPHONE HYDROCHLORIDE 1 MG/ML
1 INJECTION, SOLUTION INTRAMUSCULAR; INTRAVENOUS; SUBCUTANEOUS EVERY 4 HOURS PRN
Status: DISCONTINUED | OUTPATIENT
Start: 2017-11-16 | End: 2017-11-16 | Stop reason: HOSPADM

## 2017-11-16 RX ORDER — SODIUM CHLORIDE 0.9 % (FLUSH) 0.9 %
3 SYRINGE (ML) INJECTION
Status: DISCONTINUED | OUTPATIENT
Start: 2017-11-16 | End: 2017-11-16 | Stop reason: HOSPADM

## 2017-11-16 RX ORDER — PROPOFOL 10 MG/ML
VIAL (ML) INTRAVENOUS CONTINUOUS PRN
Status: DISCONTINUED | OUTPATIENT
Start: 2017-11-16 | End: 2017-11-16

## 2017-11-16 RX ADMIN — SODIUM CHLORIDE: 0.9 INJECTION, SOLUTION INTRAVENOUS at 10:11

## 2017-11-16 RX ADMIN — PROPOFOL 125 MCG/KG/MIN: 10 INJECTION, EMULSION INTRAVENOUS at 11:11

## 2017-11-16 RX ADMIN — PROPOFOL 30 MG: 10 INJECTION, EMULSION INTRAVENOUS at 11:11

## 2017-11-16 RX ADMIN — CEFAZOLIN SODIUM 2 G: 2 SOLUTION INTRAVENOUS at 11:11

## 2017-11-16 RX ADMIN — MIDAZOLAM 2 MG: 1 INJECTION INTRAMUSCULAR; INTRAVENOUS at 11:11

## 2017-11-16 NOTE — PLAN OF CARE
Meets criteria for discharge. VSS. AAOx3. Tolerating po fluids. Denies pain. IV discontinued with tip intact. Discharge teaching done with pt and son. Time allowed for questions. Verbalizes under standing.

## 2017-11-16 NOTE — ANESTHESIA POSTPROCEDURE EVALUATION
Anesthesia Post Evaluation    Patient: Leonid Delacruz Jr.    Procedure(s) Performed: Procedure(s) (LRB):  OPEN REDUCTION INTERNAL FIXATION-ANKLE (Left)    Final Anesthesia Type: regional  Patient location during evaluation: PACU  Patient participation: Yes- Able to Participate  Level of consciousness: awake and alert, oriented and awake  Post-procedure vital signs: reviewed and stable  Pain management: adequate  Airway patency: patent  PONV status at discharge: No PONV  Anesthetic complications: no      Cardiovascular status: blood pressure returned to baseline  Respiratory status: unassisted and room air  Hydration status: euvolemic  Follow-up not needed.        Visit Vitals  /64   Pulse 88   Temp 36.2 °C (97.2 °F) (Skin)   Resp 18   Ht 6' (1.829 m)   Wt 88.9 kg (196 lb)   SpO2 96%   BMI 26.58 kg/m²       Pain/Mary Score: No Data Recorded

## 2017-11-16 NOTE — BRIEF OP NOTE
[facility]  Brief Operative Note     SUMMARY     Surgery Date: 11/16/2017     Surgeon(s) and Role:     * Donte Han MD - Primary    Assisting Surgeon: None    Pre-op Diagnosis:  Ankle fracture, left [S82.892A]    Post-op Diagnosis:  Post-Op Diagnosis Codes:     * Ankle fracture, left [S82.892A]    Procedure(s) (LRB):  OPEN REDUCTION INTERNAL FIXATION-ANKLE (Left)    Anesthesia: Spinal    Description of the findings of the procedure: Near anatomic reduction    Findings/Key Components: see op note    Estimated Blood Loss: * No values recorded between 11/16/2017 11:30 AM and 11/16/2017 12:35 PM *         Specimens:   Specimen (12h ago through future)    None          Discharge Note    SUMMARY     Admit Date: 11/16/2017    Discharge Date and Time:  11/16/2017 12:35 PM    Hospital Course (synopsis of major diagnoses, care, treatment, and services provided during the course of the hospital stay): Pt was admitted to same day surgery on 11/16/2017 for surgery. Pt underwent procedure, tolerated it well and without complication. Pt was brought to PACU and subsequently the Pt's pain was adequately controlled with PO pain medicine and pt met all criteria and was deemed stable for discharge. Pt was discharged to home with PO pain medicine, thorough wound care instructions, and appropriate ortho clinic follow up.           Final Diagnosis: Post-Op Diagnosis Codes:     * Ankle fracture, left [S82.892A]    Disposition: Home or Self Care    Follow Up/Patient Instructions:     Medications:  Reconciled Home Medications:   Current Discharge Medication List      START taking these medications    Details   hydrocodone-acetaminophen 5-325mg (NORCO) 5-325 mg per tablet Take 1 tablet by mouth every 6 (six) hours as needed for Pain.  Qty: 28 tablet, Refills: 0         CONTINUE these medications which have NOT CHANGED    Details   albuterol (PROAIR HFA) 90 mcg/actuation inhaler 2 HFA Aerosol Inhaler Inhalation Four  times a day p;rn  Qty: 3 Inhaler, Refills: 3    Associated Diagnoses: Simple chronic bronchitis; SOB (shortness of breath)      alendronate (FOSAMAX) 10 MG Tab       amlodipine (NORVASC) 5 MG tablet Take 1 tablet (5 mg total) by mouth once daily.  Qty: 30 tablet, Refills: 11      aspirin 81 MG Chew Take 81 mg by mouth once daily.      calcium carbonate (OS-JOANA) 500 mg calcium (1,250 mg) tablet Take 1 tablet (500 mg total) by mouth 2 (two) times daily.  Refills: 0      celecoxib (CELEBREX) 200 MG capsule TAKE 1 CAPSULE(200 MG) BY MOUTH TWICE DAILY  Qty: 60 capsule, Refills: 0      cholecalciferol, vitamin D3, 400 unit Cap Take 1 capsule (400 Units total) by mouth 2 (two) times daily.  Refills: 0      omeprazole (PRILOSEC) 40 MG capsule TAKE 1 CAPSULE BY MOUTH TWICE DAILY BEFORE MEALS  Qty: 60 capsule, Refills: 5    Associated Diagnoses: Gastroesophageal reflux disease with hiatal hernia      oxyCODONE-acetaminophen (PERCOCET) 5-325 mg per tablet Take 1 tablet by mouth every 6 (six) hours as needed for Pain.  Qty: 15 tablet, Refills: 0      sertraline (ZOLOFT) 100 MG tablet TAKE ONE TABLET BY MOUTH ONCE DAILY.  Qty: 90 tablet, Refills: 3      !! SYMBICORT 160-4.5 mcg/actuation HFAA INHALE 2 PUFFS INTO LUNGS EVERY 12 HOURS  Qty: 10.2 g, Refills: 0      !! SYMBICORT 160-4.5 mcg/actuation HFAA INHALE 2 PUFFS BY MOUTH TWICE DAILY  Qty: 10.2 g, Refills: 0      tamsulosin (FLOMAX) 0.4 mg Cp24 TAKE 1 CAPSULE BY MOUTH DAILY  Qty: 90 capsule, Refills: 3      traMADol (ULTRAM) 50 mg tablet TAKE ONE TO TWO TABLETS BY MOUTH EVERY 8 HOURS AS NEEDED.  Qty: 60 tablet, Refills: 3       !! - Potential duplicate medications found. Please discuss with provider.          Discharge Procedure Orders  Diet general     Call MD for:  temperature >100.4     Call MD for:  persistent nausea and vomiting     Call MD for:  severe uncontrolled pain     Call MD for:  difficulty breathing, headache or visual disturbances     Call MD for:  redness,  tenderness, or signs of infection (pain, swelling, redness, odor or green/yellow discharge around incision site)     Call MD for:  hives     Call MD for:  persistent dizziness or light-headedness     Call MD for:  extreme fatigue     Leave dressing on - Keep it clean, dry, and intact until clinic visit     Non weight bearing       Follow-up Information     Donte Han MD In 2 weeks.    Specialty:  Orthopedic Surgery  Contact information:  [address] [facility]  [address]  [place] [address] [phone]

## 2017-11-16 NOTE — ANESTHESIA PREPROCEDURE EVALUATION
11/16/2017  Leonid Delacruz Jr. is a 83 y.o., male.    Anesthesia Evaluation    I have reviewed the Patient Summary Reports.    I have reviewed the Nursing Notes.   I have reviewed the Medications.     Review of Systems  Anesthesia Hx:  No problems with previous Anesthesia    EENT/Dental:EENT/Dental Normal   Cardiovascular:   Hypertension    Pulmonary:   COPD    Renal/:   Chronic Renal Disease    Musculoskeletal:   Arthritis     Neurological:  Neurology Normal    Psych:   Psychiatric History          Physical Exam  General:  Well nourished, Obesity    Airway/Jaw/Neck:  Airway Findings: Mouth Opening: Normal Tongue: Normal  General Airway Assessment: Adult  Mallampati: II  TM Distance: Normal, at least 6 cm     Eyes/Ears/Nose:  Eyes/Ears/Nose Findings:     Chest/Lungs:  Chest/Lungs Findings: Clear to auscultation, Normal Respiratory Rate     Heart/Vascular:  Heart Findings: Rate: Normal  Rhythm: Regular Rhythm  Sounds: Normal        Mental Status:  Mental Status Findings:  Cooperative, Alert and Oriented         Anesthesia Plan  Type of Anesthesia, risks & benefits discussed:  Anesthesia Type:  regional, MAC  Patient's Preference:   Intra-op Monitoring Plan:   Intra-op Monitoring Plan Comments:   Post Op Pain Control Plan: peripheral nerve block  Post Op Pain Control Plan Comments:   Induction:   IV  Beta Blocker:         Informed Consent:  Anesthesia consent signed with patient.  ASA Score: 3     Day of Surgery Review of History & Physical:  There are no significant changes.          Ready For Surgery From Anesthesia Perspective.

## 2017-11-16 NOTE — ANESTHESIA PROCEDURE NOTES
Peripheral    Patient location during procedure: pre-op   Block not for primary anesthetic.  Reason for block: at surgeon's request and post-op pain management   Post-op Pain Location: left foot/ankle  Start time: 11/16/2017 11:01 AM  Timeout: 11/16/2017 11:00 AM   End time: 11/16/2017 11:05 AM  Surgery related to: left foot/ankle  Staffing  Anesthesiologist: EMELIA TURNER  Performed: anesthesiologist   Preanesthetic Checklist  Completed: patient identified, site marked, surgical consent, pre-op evaluation, timeout performed, IV checked, risks and benefits discussed and monitors and equipment checked  Peripheral Block  Patient position: supine  Prep: ChloraPrep  Patient monitoring: heart rate, cardiac monitor, continuous pulse ox, continuous capnometry and frequent blood pressure checks  Block type: popliteal and saphenous  Laterality: left  Injection technique: single shot  Needle  Needle type: Stimuplex   Needle gauge: 21 G  Needle length: 4 in  Needle localization: anatomical landmarks and ultrasound guidance   -ultrasound image captured on disc.  Assessment  Injection assessment: negative aspiration, negative parasthesia and local visualized surrounding nerve  Paresthesia pain: none  Heart rate change: no  Slow fractionated injection: yes  Medications:  Bolus administered: 50 mL of 0.5 bupivacaine  Epinephrine added: 3.75 mcg/mL (1/300,000) and none  Additional Notes  VSS.  [facility] RN monitoring vitals throughout procedure.  Patient tolerated procedure well.

## 2017-11-16 NOTE — DISCHARGE SUMMARY
[facility]  Brief Operative Note     SUMMARY     Surgery Date: 11/16/2017     Surgeon(s) and Role:     * Donte Han MD - Primary    Assisting Surgeon: None    Pre-op Diagnosis:  Ankle fracture, left [S82.892A]    Post-op Diagnosis:  Post-Op Diagnosis Codes:     * Ankle fracture, left [S82.892A]    Procedure(s) (LRB):  OPEN REDUCTION INTERNAL FIXATION-ANKLE (Left)    Anesthesia: Spinal    Description of the findings of the procedure: Near anatomic reduction    Findings/Key Components: see op note    Estimated Blood Loss: * No values recorded between 11/16/2017 11:30 AM and 11/16/2017 12:35 PM *         Specimens:   Specimen (12h ago through future)    None          Discharge Note    SUMMARY     Admit Date: 11/16/2017    Discharge Date and Time:  11/16/2017 12:35 PM    Hospital Course (synopsis of major diagnoses, care, treatment, and services provided during the course of the hospital stay): Pt was admitted to same day surgery on 11/16/2017 for surgery. Pt underwent procedure, tolerated it well and without complication. Pt was brought to PACU and subsequently the Pt's pain was adequately controlled with PO pain medicine and pt met all criteria and was deemed stable for discharge. Pt was discharged to home with PO pain medicine, thorough wound care instructions, and appropriate ortho clinic follow up.           Final Diagnosis: Post-Op Diagnosis Codes:     * Ankle fracture, left [S82.892A]    Disposition: Home or Self Care    Follow Up/Patient Instructions:     Medications:  Reconciled Home Medications:   Current Discharge Medication List      START taking these medications    Details   hydrocodone-acetaminophen 5-325mg (NORCO) 5-325 mg per tablet Take 1 tablet by mouth every 6 (six) hours as needed for Pain.  Qty: 28 tablet, Refills: 0         CONTINUE these medications which have NOT CHANGED    Details   albuterol (PROAIR HFA) 90 mcg/actuation inhaler 2 HFA Aerosol Inhaler Inhalation Four  times a day p;rn  Qty: 3 Inhaler, Refills: 3    Associated Diagnoses: Simple chronic bronchitis; SOB (shortness of breath)      alendronate (FOSAMAX) 10 MG Tab       amlodipine (NORVASC) 5 MG tablet Take 1 tablet (5 mg total) by mouth once daily.  Qty: 30 tablet, Refills: 11      aspirin 81 MG Chew Take 81 mg by mouth once daily.      calcium carbonate (OS-JOANA) 500 mg calcium (1,250 mg) tablet Take 1 tablet (500 mg total) by mouth 2 (two) times daily.  Refills: 0      celecoxib (CELEBREX) 200 MG capsule TAKE 1 CAPSULE(200 MG) BY MOUTH TWICE DAILY  Qty: 60 capsule, Refills: 0      cholecalciferol, vitamin D3, 400 unit Cap Take 1 capsule (400 Units total) by mouth 2 (two) times daily.  Refills: 0      omeprazole (PRILOSEC) 40 MG capsule TAKE 1 CAPSULE BY MOUTH TWICE DAILY BEFORE MEALS  Qty: 60 capsule, Refills: 5    Associated Diagnoses: Gastroesophageal reflux disease with hiatal hernia      oxyCODONE-acetaminophen (PERCOCET) 5-325 mg per tablet Take 1 tablet by mouth every 6 (six) hours as needed for Pain.  Qty: 15 tablet, Refills: 0      sertraline (ZOLOFT) 100 MG tablet TAKE ONE TABLET BY MOUTH ONCE DAILY.  Qty: 90 tablet, Refills: 3      !! SYMBICORT 160-4.5 mcg/actuation HFAA INHALE 2 PUFFS INTO LUNGS EVERY 12 HOURS  Qty: 10.2 g, Refills: 0      !! SYMBICORT 160-4.5 mcg/actuation HFAA INHALE 2 PUFFS BY MOUTH TWICE DAILY  Qty: 10.2 g, Refills: 0      tamsulosin (FLOMAX) 0.4 mg Cp24 TAKE 1 CAPSULE BY MOUTH DAILY  Qty: 90 capsule, Refills: 3      traMADol (ULTRAM) 50 mg tablet TAKE ONE TO TWO TABLETS BY MOUTH EVERY 8 HOURS AS NEEDED.  Qty: 60 tablet, Refills: 3       !! - Potential duplicate medications found. Please discuss with provider.          Discharge Procedure Orders  Diet general     Call MD for:  temperature >100.4     Call MD for:  persistent nausea and vomiting     Call MD for:  severe uncontrolled pain     Call MD for:  difficulty breathing, headache or visual disturbances     Call MD for:  redness,  tenderness, or signs of infection (pain, swelling, redness, odor or green/yellow discharge around incision site)     Call MD for:  hives     Call MD for:  persistent dizziness or light-headedness     Call MD for:  extreme fatigue     Leave dressing on - Keep it clean, dry, and intact until clinic visit     Non weight bearing       Follow-up Information     Donte Han MD In 2 weeks.    Specialty:  Orthopedic Surgery  Contact information:  [address] [facility]  [address]  [place] [address] [phone]                 I agree with findings outlined by the resident.

## 2017-11-16 NOTE — OP NOTE
Orthopedic Surgery Operative Note    Attending:  Dr. Han    Date of Procedure:  11/16/2017    Pre-op Diagnosis:   Left ankle lateral mall fracture    Post-op Diagnosis:  Same    Procedure:  ORIF left ankle lateral malleolus    Implant:  Synthes distal fibular locking plate and corresponding screws.   2.7 lag screw    Anesthesia:  Mac    Surgeon:  Dr. Guille Castrejon    Estimated Blood Loss:  20cc     Indications:  83 y.o.male with L ankle fracture. He was reduced in the ER 2 days ago and was seen in clinic yesterday. Discussion was had regarding treatment options and after informed consent he elected to undergo surgical fixation of his ankle.       Procedure in Detail:  After risks, benefits, and alternatives of the procedure were discussed in detail, informed consent was obtained from the patient and family. The patient was taken to the operating room, administered MAC anesthesia and placed in the supine position. The LLE was prepped and draped in the usual sterile fashion. A time out was performed: confirmation of the correct patient, operative side, operative site, site frank, allergies and the appropriate antibiotics were administered.    A standard lateral incision was made. Care was taken to maintain meticulous hemostasis and to protect all neurovascular structures.     The bone was exposed and fracture edges were tidied up. Once sufficiently mobile the bone was reduced and held with serrated clamps. We checked an xray and were very pleased with the reduction. We placed a single 2.7 lag screw perpendicular to the fracture site. Given the soft nature of his bone we elected to use a distal fibular locking plate. It fit the bone nicely. We then filled it distally then proximally. We checked final fluoro which was deemed to be adequate. We used a cotton test to check stability of the syndesmosis which was deemed to be stable. We then thoroughly irrigated the wounds. The wounds were closed in layers. A  sterile dressing was applied and a splint.     The patient was taken to the PACU in stable condition having suffered no apparent untoward event from the procedure.    Complications:  None    Drains:  None    Pathology/Specimens:  None    Plan:  The patient will remain in the splint until follow up. At that time we will evaluate the wound and will remove sutures if possible. He will be NWB for a period of 6 week. We will begin ROM before then if compliance is not an issue.     Dr. Han was present for all key portions of the procedure.

## 2017-11-16 NOTE — DISCHARGE INSTRUCTIONS
Having Ankle Fracture Open Reduction and Internal Fixation (ORIF)  Open reduction and internal fixation (ORIF) is a type of treatment to fix a broken bone. It puts the pieces of a broken bone back together so they can heal. Open reduction means the bones are put back in place during a surgery. Internal fixation means that special hardware is used to hold the bone pieces together. This helps the bone heals correctly. The procedure is done by an orthopedic surgeon. This is a doctor with special training in treating bone, joint, and muscle problems  After your surgery  Talk with your surgeon about what you can expect after your surgery. You may go home the same day. Or you may stay overnight in the hospital. Before leaving the hospital, you will likely have X-rays taken of your ankle. This is to check the repair.  You will have some pain after the surgery. Your doctor will tell you what pain medicine you can take to help reduce the pain. Avoid certain over-the-counter medicines for pain as instructed. Some of these may interfere with bone healing. You can also use ice packs to help lessen pain and swelling.  You may be told to keep your ankle elevated for a period of time after your surgery. Youll also need to not move your ankle for a while. Often, this means wearing a brace, perhaps for several weeks. Youll get instructions about how to move your leg and when you can put weight on it. Your surgeon may also tell you to eat foods high in calcium and vitamin D to help with bone healing. You may need to take medicine to prevent blood clots (blood thinner) for a little while after your surgery. Follow all your doctors instructions carefully.  Follow-up care  Make sure to keep all of your follow-up appointments. You may need to have your stitches removed a week or so after your surgery.  You may have physical therapy to improve the strength and movement of your ankle. The therapy may include treatments and exercises.  The therapy improves your chances of a full recovery. Most people are able to return to all their normal activities within a few months.     When to call your healthcare provider  Call your healthcare provider right away if you have any of these:  · Fever of 100.4°F (38°C) or higher  · Redness, swelling, or fluid leaking from your incision that gets worse  · Pain that gets worse  · Loss of feeling in your foot or leg   Date Last Reviewed: 8/6/2015 © 2000-2017 [employer]. [address] [place] [address]. All rights reserved. This information is not intended as a substitute for professional medical care. Always follow your healthcare professional's instructions.

## 2017-11-25 NOTE — ED PROVIDER NOTES
Encounter Date: 11/14/2017       History     Chief Complaint   Patient presents with    Ankle Pain     pt presents to Ed with obvious deformity to left ankle. pt states he sprained his ankle and then fell approximately 1.5hr ago.       HPI   This is a 83 y.o. 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     The patient presents to the Emergency Department with injury to left ankle s/p fall.   Pt has obvious deformity to the ankle.  Symptoms are associated with nothing.  Pt denies numbness, wound, other injury.   Symptoms are aggravated by movement/palpation.  Symptoms are relieved by nothing.   Patient has no prior history of similar symptoms.     Pt has a past surgical history that includes Cataract extraction w/  intraocular lens implant (8/20/2001,   10/01/2001); Laminotomy (5/20/2003); Appendectomy; TONSILLECTOMY, ADENOIDECTOMY; Penile   prosthesis implant; Vasectomy; Rotator cuff repair; Colonoscopy (6/06/2011); and   Esophagogastroduodenoscopy (5/02/2010, 8/11/2010, 3/21/2011, 6/06/2011).      Review of patient's allergies indicates:  No Known Allergies  Past Medical History:   Diagnosis Date    Amblyopia, strabismic - Left Eye 1/7/2014    Anxiety     AR (allergic rhinitis)     Cataract     COPD (chronic obstructive pulmonary disease)     Gastroesophageal reflux disease with hiatal hernia     History of bleeding peptic ulcer 5/02/2010    History of gastritis 9/22/2009    with hemorrhage    HTN (hypertension), benign 6/14/2017    MDD (major depressive disorder), recurrent episode,  moderate 3/3/2016    Osteoarthritis     PUD (peptic ulcer disease)     stomach     Past Surgical History:   Procedure Laterality Date    APPENDECTOMY      CATARACT EXTRACTION W/  INTRAOCULAR LENS IMPLANT  8/20/2001, 10/01/2001    COLONOSCOPY  6/06/2011    ESOPHAGOGASTRODUODENOSCOPY  5/02/2010, 8/11/2010, 3/21/2011, 6/06/2011    LAMINOTOMY  5/20/2003    Left L4-L5    PENILE PROSTHESIS IMPLANT      ROTATOR CUFF REPAIR      Bilateral    TONSILLECTOMY, ADENOIDECTOMY      VASECTOMY       Family History   Problem Relation Age of Onset    No Known Problems Sister     No Known Problems Daughter     No Known Problems Son     Amblyopia Neg Hx     Blindness Neg Hx     Cancer Neg Hx     Cataracts Neg Hx     Diabetes Neg Hx     Glaucoma Neg Hx     Hypertension Neg Hx     Macular degeneration Neg Hx     Retinal detachment Neg Hx     Strabismus Neg Hx     Stroke Neg Hx     Thyroid disease Neg Hx     Heart disease Neg Hx      Social History   Substance Use Topics    Smoking status: Never Smoker    Smokeless tobacco: Never Used    Alcohol use 4.2 oz/week     7 Cans of beer per week      Comment: 7-9 beers a week     Review of Systems   Constitutional: Positive for activity change.   Gastrointestinal: Negative for nausea.   Musculoskeletal: Positive for arthralgias.   Skin: Negative for wound.   Neurological: Positive for weakness. Negative for numbness.   Psychiatric/Behavioral: The patient is not nervous/anxious.        Physical Exam     Initial Vitals [11/14/17 1523]   BP Pulse Resp Temp SpO2   (!) 109/57 94 18 98.5 °F (36.9 °C) 98 %      MAP       74.33         Physical Exam    Nursing note and vitals reviewed.  Constitutional: He appears well-developed and well-nourished. No distress.   HENT:   Head: Normocephalic and atraumatic.   Eyes: Conjunctivae are normal.   Cardiovascular: Normal rate, regular rhythm, normal heart sounds and intact distal pulses.   2+ DP bilaterally   Pulmonary/Chest: Breath  sounds normal. No respiratory distress.   Musculoskeletal: Normal range of motion. He exhibits tenderness (left ankle).        Left ankle: He exhibits swelling and deformity. Tenderness. Lateral malleolus and medial malleolus tenderness found.        Feet:    Neurological: He is alert and oriented to person, place, and time. He has normal strength.   Skin: Skin is warm and dry. Capillary refill takes less than 2 seconds.         ED Course   Orthopedic Injury  Date/Time: 2017 5:00 PM  Performed by: HUGH SIGALA  Authorized by: HUGH SIGALA     Location procedure was performed:  [facility] EMERGENCY DEPARTMENT  Pre-operative diagnosis:  Left ankle fracture dislocation  Post-operative diagnosis:  Same  Consent Done?:  Yes  Universal Protocol:     Written consent obtained?: Yes      Risks and benefits: Risks, benefits and alternatives were discussed      Consent given by:  Patient (son signed (POA))    Patient states understanding of procedure being performed: Yes      Patient's understanding of procedure matches consent: Yes      Procedure consent matches procedure scheduled: Yes      Relevant documents present and verified: Yes      Test results available and properly labeled: Yes      Site marked: Yes      Imaging studies available: Yes      Patient identity confirmed:  , MRN and name    Time Out: Immediately prior to the procedure a time out was called    Injury:     Injury location:  Ankle    Location details:  Left ankle    Injury type:  Fracture-dislocation    Fracture type: lateral malleolus      Fracture type: lateral malleolus        Pre-procedure assessment:     Neurovascular status: Neurovascularly intact      Distal perfusion: normal      Neurological function: normal      Range of motion: reduced      Local anesthesia used?: No      Patient sedated?: Yes      ASA Class:  Class 3 - Systemic Illness with functional impairment.    Mallampati Score:  Class 2 - Visualization of the soft palate, fauces,  and uvula.  Date/Time of last solid:  11/13/2017 8:00 PM    Date/Time of last fluid:  11/14/2017 7:00 AM    Patient/Family history of anesthesia or sedation complications: No      Sedation type: deep sedation      Sedation:  Propofol    Analgesia:  Fentanyl    Vital signs: Vital signs monitored during sedation        Selections made in this section will also lock the Injury type section above.:     Manipulation performed?: Yes      Skin traction used?: No      Skeletal traction used?: Yes      Reduction successful?: Yes      Confirmation: Reduction confirmed by x-ray      Immobilization:  Splint    Splint type:  Short leg    Complications: No      Estimated blood loss (mL):  0    Specimens: No      Implants: No    Post-procedure assessment:     Neurovascular status: Neurovascularly intact      Distal perfusion: normal      Neurological function: normal      Range of motion: normal      Patient tolerance:  Patient tolerated the procedure well with no immediate complications      Labs Reviewed - No data to display          Medical Decision Making:   Initial Assessment:   This is an 83-year-old male presents with left ankle injury.  X-rays reviewed show a lateral malleolar fracture with tibiotalar dislocation.   Ankle was reduced by me under sedation, patient tolerated well.  X-ray was reviewed by me and showed improvement, however there was still some luxation.  Myself and orthopedic resident further reduced ankle and resplinted.  Subsequent x-ray of the ankle showed further improvement.  Patient follow-up with orthopedist as referred to return for any concerns.  Cast care instructions discussed with patient and family.                   ED Course      Clinical Impression:     1. Closed fracture dislocation of left ankle, initial encounter    2. Deformity    3. Fall    4. Closed left ankle fracture    5. Closed fracture dislocation of left ankle                                 Jt Rivero MD  11/25/17 [id number]

## 2017-12-13 RX ORDER — CELECOXIB 200 MG/1
CAPSULE ORAL
Qty: 60 CAPSULE | Refills: 0 | Status: SHIPPED | OUTPATIENT
Start: 2017-12-13 | End: 2018-02-14 | Stop reason: SDUPTHER

## 2018-01-22 RX ORDER — BUDESONIDE AND FORMOTEROL FUMARATE DIHYDRATE 160; 4.5 UG/1; UG/1
AEROSOL RESPIRATORY (INHALATION)
Qty: 10.2 G | Refills: 0 | Status: SHIPPED | OUTPATIENT
Start: 2018-01-22 | End: 2018-04-24 | Stop reason: SDUPTHER

## 2018-02-15 RX ORDER — CELECOXIB 200 MG/1
CAPSULE ORAL
Qty: 60 CAPSULE | Refills: 0 | Status: SHIPPED | OUTPATIENT
Start: 2018-02-15

## 2018-03-13 ENCOUNTER — PES CALL (OUTPATIENT)
Dept: ADMINISTRATIVE | Facility: CLINIC | Age: 83
End: 2018-03-13

## 2018-03-26 ENCOUNTER — OFFICE VISIT (OUTPATIENT)
Dept: OPTOMETRY | Facility: CLINIC | Age: 83
End: 2018-03-26
Payer: MEDICARE

## 2018-03-26 DIAGNOSIS — Z96.1 PSEUDOPHAKIA OF BOTH EYES: ICD-10-CM

## 2018-03-26 DIAGNOSIS — H52.7 REFRACTIVE ERROR: ICD-10-CM

## 2018-03-26 DIAGNOSIS — H35.3131 EARLY DRY STAGE NONEXUDATIVE AGE-RELATED MACULAR DEGENERATION OF BOTH EYES: Primary | ICD-10-CM

## 2018-03-26 PROCEDURE — 99999 PR PBB SHADOW E&M-EST. PATIENT-LVL II: CPT | Mod: PBBFAC,,, | Performed by: OPTOMETRIST

## 2018-03-26 PROCEDURE — 92014 COMPRE OPH EXAM EST PT 1/>: CPT | Mod: S$GLB,,, | Performed by: OPTOMETRIST

## 2018-03-26 PROCEDURE — 92015 DETERMINE REFRACTIVE STATE: CPT | Mod: S$GLB,,, | Performed by: OPTOMETRIST

## 2018-03-26 RX ORDER — STANNOUS FLUORIDE 1 MG/G
GEL, DENTIFRICE DENTAL
Refills: 0 | COMMUNITY
Start: 2018-03-14

## 2018-03-26 NOTE — PROGRESS NOTES
"HPI     CHRISTOPHER 03/2016 Distance not as clear.  Glasses about 4 yrs. Old.  Had fall   about 3-4 yrs. Ago and got 12 stiches over eye, no orbital fracture, but   OD doesn't"t  feel quite right.  " Feels like there's something on the   eyeball".  FBS OD x days to weeks, comes and goes, no eye drops used. Mild , no assoc   discharge.     Last edited by Fausto Paez, OD on 3/26/2018 12:50 PM. (History)            Assessment /Plan     For exam results, see Encounter Report.    Early dry stage nonexudative age-related macular degeneration of both eyes    Pseudophakia of both eyes    Refractive error      1. Dry. Monitor condition. Patient to report any changes. RTC 1 year recheck.  2. Monitor condition. Patient to report any changes. RTC 1 year recheck.  3. Spec Rx given. Different lens options discussed with patient. RTC 1 year full exam.               "

## 2018-04-05 DIAGNOSIS — K44.9 GASTROESOPHAGEAL REFLUX DISEASE WITH HIATAL HERNIA: ICD-10-CM

## 2018-04-05 DIAGNOSIS — K21.9 GASTROESOPHAGEAL REFLUX DISEASE WITH HIATAL HERNIA: ICD-10-CM

## 2018-04-05 RX ORDER — OMEPRAZOLE 40 MG/1
CAPSULE, DELAYED RELEASE ORAL
Qty: 180 CAPSULE | Refills: 0 | Status: SHIPPED | OUTPATIENT
Start: 2018-04-05 | End: 2018-10-05 | Stop reason: SDUPTHER

## 2018-04-05 RX ORDER — OMEPRAZOLE 40 MG/1
CAPSULE, DELAYED RELEASE ORAL
Qty: 60 CAPSULE | Refills: 0 | Status: SHIPPED | OUTPATIENT
Start: 2018-04-05 | End: 2018-04-05 | Stop reason: SDUPTHER

## 2018-04-17 ENCOUNTER — TELEPHONE (OUTPATIENT)
Dept: NEUROSURGERY | Facility: CLINIC | Age: 83
End: 2018-04-17

## 2018-04-17 NOTE — TELEPHONE ENCOUNTER
Returned call scheduled pt to see Kandy on 04/24/18     ----- Message from Grant Raman sent at 4/17/2018  2:25 PM CDT -----  Contact: [phone]/self  Pt requesting to be seen before the next available appointment for pain he's still having since his surgery with Dr. Mayen.  Please call and advise

## 2018-04-24 ENCOUNTER — HOSPITAL ENCOUNTER (OUTPATIENT)
Dept: RADIOLOGY | Facility: HOSPITAL | Age: 83
Discharge: HOME | End: 2018-04-24
Attending: PHYSICIAN ASSISTANT
Payer: MEDICARE

## 2018-04-24 ENCOUNTER — OFFICE VISIT (OUTPATIENT)
Dept: NEUROSURGERY | Facility: CLINIC | Age: 83
End: 2018-04-24
Payer: MEDICARE

## 2018-04-24 VITALS
DIASTOLIC BLOOD PRESSURE: 67 MMHG | HEART RATE: 94 BPM | SYSTOLIC BLOOD PRESSURE: 127 MMHG | BODY MASS INDEX: 26.01 KG/M2 | WEIGHT: 191.81 LBS
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
## 2018-04-24 DIAGNOSIS — G89.29 ACUTE EXACERBATION OF CHRONIC LOW BACK PAIN: Primary | ICD-10-CM

## 2018-04-24 DIAGNOSIS — M54.50 ACUTE EXACERBATION OF CHRONIC LOW BACK PAIN: ICD-10-CM

## 2018-04-24 DIAGNOSIS — G89.29 ACUTE EXACERBATION OF CHRONIC LOW BACK PAIN: ICD-10-CM

## 2018-04-24 DIAGNOSIS — M54.50 ACUTE EXACERBATION OF CHRONIC LOW BACK PAIN: Primary | ICD-10-CM

## 2018-04-24 PROCEDURE — 3074F SYST BP LT 130 MM HG: CPT | Mod: CPTII,S$GLB,, | Performed by: PHYSICIAN ASSISTANT

## 2018-04-24 PROCEDURE — 3078F DIAST BP <80 MM HG: CPT | Mod: CPTII,S$GLB,, | Performed by: PHYSICIAN ASSISTANT

## 2018-04-24 PROCEDURE — 99213 OFFICE O/P EST LOW 20 MIN: CPT | Mod: S$GLB,,, | Performed by: PHYSICIAN ASSISTANT

## 2018-04-24 PROCEDURE — 72100 X-RAY EXAM L-S SPINE 2/3 VWS: CPT | Mod: 26,,, | Performed by: RADIOLOGY

## 2018-04-24 PROCEDURE — 72100 X-RAY EXAM L-S SPINE 2/3 VWS: CPT | Mod: TC,FY

## 2018-04-24 PROCEDURE — 99999 PR PBB SHADOW E&M-EST. PATIENT-LVL III: CPT | Mod: PBBFAC,,, | Performed by: PHYSICIAN ASSISTANT

## 2018-04-24 NOTE — PROGRESS NOTES
"[facility] - Neurosurgery  Progress Note      SUBJECTIVE:     Chief Complaint/Reason for Visit: low back pain     History of Present Illness:  Leonid Delacruz Jr. is a 83 y.o. male who is s/p sacroplasty on 7/6/2017 who presents complaining of low back pain after a fall. Patient was last seen by Dr. Mayen at 6 weeks post op on 8/14/17. At that time, Dr. Mayen was recommending bilateral L3-4, L4-5, L5-S1 RFA with pain management. Dr. Mayen stated patient "would be a candidate for SCS trial if the RFA fails to help him." Patient underwent MBB with Dr. Haas on 8/23 and 9/20. He underwent RFA on 10/11 and 11/1. Patient reports he had no pain relief from the RFA. He suffered an ankle fracture after a fall that required ORIF in November 2017. He states he has been released by ortho. He reports last week he fell in his bathroom and experienced acute worsening of his low back pain. The pain is present across his belt line and will radiate up his back. He states the pain is a constant ache. At best it is 5-6/10 but it can reach 10/10. It is worse with walking. Nothing really makes it better. He denies leg pain, numbness, tingling, weakness. Denies bowel/bladder dysfunction.       Low Back Pain Scale  R Low Back-Pain Score: 8  R Low Back-Pain Intensity: Pain killers give moderate relief from pain  R Low Back-Pain Score: I need some help, but manage most of my personal care  Low Back-Lifting: I can only lift very light weights   Low Back-Walking: I can only walk using a cane or crutches   Low Back-Sitting: Pain prevents me from sitting more than 1 hour   Low Back-Standing: I cannot stand for longer than 10 minutes with increasing pain   Low Back-Sleeping: Because of pain my normal nights sleep is reduced by less than one half   Low Back-Social Life: I have hardly any social life because of the pain   Low Back-Traveling: Pain restricts me to short necessary journeys under 30 minutes   Low Back-Changing Degree of Pain: My pain is " gradually worsening           Review of patient's allergies indicates:  No Known Allergies    Past Medical History:   Diagnosis Date    Amblyopia, strabismic - Left Eye 1/7/2014    Anxiety     AR (allergic rhinitis)     Cataract     COPD (chronic obstructive pulmonary disease)     Gastroesophageal reflux disease with hiatal hernia     History of bleeding peptic ulcer 5/02/2010    History of gastritis 9/22/2009    with hemorrhage    HTN (hypertension), benign 6/14/2017    MDD (major depressive disorder), recurrent episode, moderate 3/3/2016    Osteoarthritis     PUD (peptic ulcer disease)     stomach     Past Surgical History:   Procedure Laterality Date    APPENDECTOMY      CATARACT EXTRACTION W/  INTRAOCULAR LENS IMPLANT  8/20/2001, 10/01/2001    COLONOSCOPY  6/06/2011    ESOPHAGOGASTRODUODENOSCOPY  5/02/2010, 8/11/2010, 3/21/2011, 6/06/2011    LAMINOTOMY  5/20/2003    Left L4-L5    PENILE PROSTHESIS IMPLANT      ROTATOR CUFF REPAIR      Bilateral    TONSILLECTOMY, ADENOIDECTOMY      VASECTOMY       Family History   Problem Relation Age of Onset    No Known Problems Sister     No Known Problems Daughter     No Known Problems Son     Amblyopia Neg Hx     Blindness Neg Hx     Cancer Neg Hx     Cataracts Neg Hx     Diabetes Neg Hx     Glaucoma Neg Hx     Hypertension Neg Hx     Macular degeneration Neg Hx     Retinal detachment Neg Hx     Strabismus Neg Hx     Stroke Neg Hx     Thyroid disease Neg Hx     Heart disease Neg Hx      Social History   Substance Use Topics    Smoking status: Never Smoker    Smokeless tobacco: Never Used    Alcohol use 4.2 oz/week     7 Cans of beer per week      Comment: 7-9 beers a week        Review of Systems:  Constitutional: no fever, chills or night sweats. No changes in weight   Eyes: no visual changes   ENT: no nasal congestion or sore throat   Respiratory: no cough or shortness of breath   Cardiovascular: no chest pain or palpitations    Gastrointestinal: no nausea or vomiting   Genitourinary: no hematuria or dysuria   Musculoskeletal: + back pain   Neurological: no seizures or tremors       OBJECTIVE:     Vital Signs (Most Recent):  Pulse: 94 (04/24/18 1513)  BP: 127/67 (04/24/18 1513)    Physical Exam:  General: well developed, well nourished, no distress.   Neurologic: Alert and oriented. Thought content appropriate.  GCS: Motor: 6/Verbal: 5/Eyes: 4 GCS Total: 15  Head: normocephalic, atraumatic  Eyes: EOMI.  Neck: trachea midline, no JVD   Cardiovascular: no LE edema  Pulmonary: normal respirations, no signs of respiratory distress  Abdomen: soft, non-distended  Skin: Skin is warm, dry and intact.    Motor Strength: Moves all extremities spontaneously with good tone.  No abnormal movements seen.     Iliopsoas Quadriceps Knee  Flexion Tibialis  anterior Gastro- cnemius EHL    Lower: R 5/5 5/5 5/5 5/5 5/5 5/5     L 5/5 5/5 5/5 5/5 5/5 5/5      Gait: slow   Lumbar Spine: no TTP      Diagnostic Results:  No new imaging     ASSESSMENT/PLAN:     84 yo male with acute exacerbation of chronic low back pain after a fall last week. Patient with history of compression fractures.    -XR lumbar spine today to rule out fracture  -Refer back to pain management to discuss repeat RFA vs SCS trial  -Offered compound cream for pain. Patient declined   -Encouraged to call with any questions or concerns  -Will call patient with results of xray       Kandy Black PA-C  Neurosurgery

## 2018-04-25 ENCOUNTER — TELEPHONE (OUTPATIENT)
Dept: ADMINISTRATIVE | Facility: OTHER | Age: 83
End: 2018-04-25

## 2018-04-25 RX ORDER — CALCITONIN SALMON 200 [IU]/.09ML
1 SPRAY, METERED NASAL DAILY
Qty: 1 BOTTLE | Refills: 0 | Status: SHIPPED | OUTPATIENT
Start: 2018-04-25 | End: 2019-04-25

## 2018-04-25 RX ORDER — TRAMADOL HYDROCHLORIDE 50 MG/1
TABLET ORAL
Qty: 80 TABLET | Refills: 0 | Status: SHIPPED | OUTPATIENT
Start: 2018-04-25 | End: 2018-06-21 | Stop reason: SDUPTHER

## 2018-04-25 NOTE — TELEPHONE ENCOUNTER
----- Message from Rosemary Dorsey LPN sent at 4/24/2018  4:10 PM CDT -----  Regarding: appointment   Please advise, Thanks Lori  ----- Message -----  From: Kandy Black PA-C  Sent: 4/24/2018   4:07 PM  To: Rosemary Dorsey LPN    This patient is established with Dr. Haas. Dr. Mayen would like him to be evaluated by pain again, repeat lumbar RFA vs SCS trial    Thanks

## 2018-04-25 NOTE — TELEPHONE ENCOUNTER
Spoke with patient and scheduled appointment with Dr. Thomas for Monday May 7, 2018 at 8:30am.  Patient verbalized understanding of date and time.

## 2018-05-24 NOTE — PROGRESS NOTES
[facility] New Patient Evaluation    Referred by: (former patient of Dr. Haas)  Reason for referral: back pain    CC: back2    Last 3 PDI Scores 5/28/2018 10/4/2017 9/12/2017   Pain Disability Index (PDI) 68 18 21       HPI: Leonid Delacruz Jr. is a 83 y.o. male referred by Jose J Iraheta MD for lower back pain and a repeat lumbar RFA vs SCS trial.  Patient states the pain is in lower back and does not radiate though his hips feel weak at times.    Location: back  Severity: Currently: 10/10   Typical Range: 10/10     Exacerbation: 10/10   Onset: ongoing several years  Quality: Aching and Hot  Radiation: hips  Axial/Extremity Percentage of Pain: 100% back  Exacerbating Factors: standing  Mitigating Factors: rest  Assoc: denies night fever/night sweats, urinary incontinence, bowel incontinence, significant weight loss, significant motor weakness and loss of sensations    Previous Therapies:  PT: Attempted but exacerbated pain  HEP: Denies  TENS:  Injections:    11/1/17 - Right L3,4, 5 FACET MEDIAL BRANCH NERVE RADIOFREQUENCY NEUROTOMY (lumbar)   9/20/17 - BILATERAL L3,4,5 LUMBAR FACET MEDIAL BRANCH NERVE BLOCK                   Injections with Dr. Constantino including MBB and MB RFA                   Facet joints injections by IR    Surgery:  Medications:   - NSAIDS:   - MSK Relaxants:   - TCAs:   - SNRIs:   - Topicals:   - Anticonvulsants:  - Opioids:     Current Pain Medications:  1. Celebrex   2. Tramadol  3.     Full Medication List:    Current Outpatient Prescriptions:     albuterol (PROAIR HFA) 90 mcg/actuation inhaler, 2 HFA Aerosol Inhaler Inhalation Four times a day p;rn, Disp: 3 Inhaler, Rfl: 3    alendronate (FOSAMAX) 10 MG Tab, , Disp: , Rfl:     amlodipine (NORVASC) 5 MG tablet, Take 1 tablet (5 mg total) by mouth once daily., Disp: 30 tablet, Rfl: 11    aspirin 81 MG Chew, Take 81 mg by mouth once daily., Disp: , Rfl:     calcitonin, salmon, (FORTICAL) 200 unit/actuation nasal spray, 1  spray by Nasal route once daily., Disp: 1 Bottle, Rfl: 0    calcium carbonate (OS-JOANA) 500 mg calcium (1,250 mg) tablet, Take 1 tablet (500 mg total) by mouth 2 (two) times daily., Disp: , Rfl: 0    celecoxib (CELEBREX) 200 MG capsule, TAKE 1 CAPSULE(200 MG) BY MOUTH TWICE DAILY, Disp: 60 capsule, Rfl: 0    cholecalciferol, vitamin D3, 400 unit Cap, Take 1 capsule (400 Units total) by mouth 2 (two) times daily., Disp: , Rfl: 0    GEL-LUCIE 0.4 % Gel, BRUSH ON NIGHTLY.   SPIT OUT EXCESS AND DO NOT RINSE AFTERWARD, Disp: , Rfl: 0    omeprazole (PRILOSEC) 40 MG capsule, TAKE 1 CAPSULE BY MOUTH TWICE DAILY BEFORE MEALS, Disp: 180 capsule, Rfl: 0    sertraline (ZOLOFT) 100 MG tablet, TAKE ONE TABLET BY MOUTH ONCE DAILY., Disp: 90 tablet, Rfl: 3    SYMBICORT 160-4.5 mcg/actuation HFAA, INHALE 2 PUFFS INTO LUNGS EVERY 12 HOURS, Disp: 10.2 g, Rfl: 0    tamsulosin (FLOMAX) 0.4 mg Cp24, TAKE 1 CAPSULE BY MOUTH DAILY, Disp: 90 capsule, Rfl: 3    traMADol (ULTRAM) 50 mg tablet, Take 1-2 tablets (50 mg - 100 mg total) by mouth every 4 (four) - 6 (six) hours as needed for pain, Disp: 80 tablet, Rfl: 0     Review of Systems:  Review of Systems   Constitutional: Negative for chills and fever.   HENT: Negative for nosebleeds.    Eyes: Negative for pain.   Respiratory: Positive for shortness of breath and wheezing. Negative for hemoptysis.    Cardiovascular: Negative for chest pain.   Gastrointestinal: Negative for nausea and vomiting.   Genitourinary: Negative for dysuria.   Musculoskeletal: Positive for back pain and falls.   Skin: Negative for rash.   Neurological: Negative for focal weakness.   Endo/Heme/Allergies: Bruises/bleeds easily.   Psychiatric/Behavioral: Negative for depression. The patient has insomnia.        Allergies:  Patient has no known allergies.     Medical History:  Past Medical History:   Diagnosis Date    Amblyopia, strabismic - Left Eye 1/7/2014    Anxiety     AR (allergic rhinitis)     Cataract      COPD (chronic obstructive pulmonary disease)     Gastroesophageal reflux disease with hiatal hernia     History of bleeding peptic ulcer 5/02/2010    History of gastritis 9/22/2009    with hemorrhage    HTN (hypertension), benign 6/14/2017    MDD (major depressive disorder), recurrent episode, moderate 3/3/2016    Osteoarthritis     PUD (peptic ulcer disease)     stomach        Surgical History:  Past Surgical History:   Procedure Laterality Date    APPENDECTOMY      CATARACT EXTRACTION W/  INTRAOCULAR LENS IMPLANT  8/20/2001, 10/01/2001    COLONOSCOPY  6/06/2011    ESOPHAGOGASTRODUODENOSCOPY  5/02/2010, 8/11/2010, 3/21/2011, 6/06/2011    LAMINOTOMY  5/20/2003    Left L4-L5    PENILE PROSTHESIS IMPLANT      ROTATOR CUFF REPAIR      Bilateral    TONSILLECTOMY, ADENOIDECTOMY      VASECTOMY          Social History:  Social History     Social History    Marital status:      Spouse name: Ting    Number of children: 2    Years of education: N/A     Occupational History    Retired      Social History Main Topics    Smoking status: Never Smoker    Smokeless tobacco: Never Used    Alcohol use 4.2 oz/week     7 Cans of beer per week      Comment: 7-9 beers a week    Drug use: No    Sexual activity: Yes     Partners: Female     Other Topics Concern    Not on file     Social History Narrative    No narrative on file       Physical Exam:  Vitals:    05/28/18 0829   BP: 118/64   Pulse: 68   Weight: 86.6 kg (191 lb)   PainSc: 10-Worst pain ever     General    Nursing note and vitals reviewed.  Constitutional: He is oriented to person, place, and time. He appears well-developed and well-nourished. No distress.   HENT:   Head: Normocephalic and atraumatic.   Nose: Nose normal.   Eyes: Conjunctivae and EOM are normal. Pupils are equal, round, and reactive to light. Right eye exhibits no discharge. Left eye exhibits no discharge. No scleral icterus.   Neck: No JVD present.   Cardiovascular:  Intact distal pulses.    Pulmonary/Chest: Effort normal. No respiratory distress.   Abdominal: He exhibits no distension.   Neurological: He is alert and oriented to person, place, and time. Coordination normal.   Psychiatric: He has a normal mood and affect. His behavior is normal. Judgment and thought content normal.     General Musculoskeletal Exam   Gait: antalgic     Back (L-Spine & T-Spine) / Neck (C-Spine) Exam     Tenderness Posterior midline palpation reveals tenderness of the Upper L-Spine and Lower T-Spine.     Back (L-Spine & T-Spine) Range of Motion   Extension: abnormal   Flexion: abnormal       Muscle Strength   Right Lower Extremity   Hip Abduction: 5/5   Hip Flexion: 5/5   Quadriceps:  5/5   Hamstrin/5   Anterior tibial:  5/5/5  Gastrocsoleus:  5/5/5  Left Lower Extremity   Hip Abduction: 5/5   Hip Flexion: 5/5   Quadriceps:  5/5   Hamstrin/5   Anterior tibial:  5/5/5   Gastrocsoleus:  5/5/5      Imaging:   X-Ray Sacrum And Coccyx 17  Narrative      AP pelvis sacrum and coccyx 3 views.  Comparison 1040 HR study same day.  Fracture deformity right central acetabulum, comminuted fracture right symphysis pubis, post penile therapy prosthesis surgery, vertebral plasty S1-S4 levels stable.  Advanced facet joint arthropathy, primarily anteriorly subluxation L4 on L5, L5 on S1 unchanged.              X-Ray Sacrum And Coccyx 17  Narrative      Sacrum and coccyx.    Findings: 3 views.  There is postoperative change with bone cement identified involving the sacrum near the SI joints bilaterally.  There is a mildly displaced comminuted fracture of the superior and inferior pubic ramus adjacent to the symphysis on the right with probable mildly displaced fracture of the superior pubic ramus more posteriorly near the acetabulum.     MRI Lumbar Spine Without Contrast 5/10/17  Narrative   MRI LUMBAR SPINE    TECHNIQUE: MRI lumbar spine was performed without contrast. The following sequences  were obtained: Localizer; sagittal T1, T2 CUBE, STIR; axial T1 and T2.    COMPARISON: 09/25/13    FINDINGS:    There are 5 lumbar vertebrae.  Postoperative changes noted at L4-L5 and L5-S1 consistent with prior decompression.  There is compression fracture of L1 with severe height loss, unchanged.  Remaining vertebral body heights are maintained.  There is grade 1 anterolisthesis of L4-L5 and L5-S1.  There is a nondisplaced fracture involving the S2 sacral segment and left sacral ala, demonstrating mild angulation.  No evidence for marrow infiltrative process.  Disc heights are maintained, noting multilevel disc desiccation. Conus terminates at T12-L1 and appears unremarkable. Note made of renal cysts.  Penile prosthesis reservoir noted anterior to the urinary bladder.  Paraspinal musculature is moderately atrophied.  Evaluation of sacroiliac joints is unremarkable.    T12-L1: Mild retropulsion of L1 vertebral body effaces the ventral thecal sac. No spinal canal stenosis or neuroforaminal narrowing.    L1-L2: No spinal canal stenosis or neuroforaminal narrowing.    L2-L3: Mild bilateral facet arthropathy noted.  No spinal canal stenosis or neuroforaminal narrowing.    L3-L4: Mild bilateral facet arthropathy noted.  No spinal canal stenosis or neuroforaminal narrowing.    L4-L5: Status post left laminectomy.  Circumferential disc bulge and moderate to severe bilateral facet arthropathy result in effacement of the dorsal thecal sac and mild right, moderate left neuroforaminal narrowing.    L5-S1: Status post bilateral laminectomy.  Uncovering of the intervertebral disc along with disc bulge and severe bilateral facet arthropathy noted.  No spinal canal stenosis or neuroforaminal narrowing.     4/19/17 X-Ray Sacrum And Coccyx   Narrative   Sacrum and coccyx.  Degenerative change of the hips.  Postop changes noted.  No acute fracture or bone destruction evident.  Degenerative change in lumbar spine.        Xray Lumbar  Spine 3/3/16      lumbar spine with obliques compared to September 2013.  Bones are demineralized.  Moderate compression fracture involving the L1 vertebral body unchanged.  There is facet arthropathy particularly at the lower lumbar levels with a grade one antral listhesis of L5 on S1.  No convincing spondylolysis.  Calcified plaque in the aorta.    Impression compression fracture and degenerative changes as above.        MRI Lumbar Spine 9/25/16  MRI LUMBAR SPINE    TECHNIQUE: MRI lumbar spine was performed without contrast on a 1.5T magnet. The following sequences were obtained: Localizer; sagittal T1, T2, STIR; axial T1 and T2.    COMPARISON: 1/31/2012    FINDINGS:    There are 5 lumbar vertebrae.  There is mild (grade 1) anterolisthesis of L5 on S1.  There is a chronic compression fracture of the L1 vertebral body with greater than 50% loss of vertebral body height.  There is an associated marrow edema.  Vertebral   body heights are preserved the remaining levels.  There is multilevel disk desiccation.  Disk heights are relatively well preserved. Conus terminates at L1 and appears unremarkable. Limited evaluation of posterior abdominal structures is unremarkable.    Paraspinal musculature is markedly atrophied below the level of S1 on the left.  Evaluation of sacroiliac joints is unremarkable.    L1-L2: No spinal canal stenosis or neuroforaminal narrowing.    L2-L3: Mild diffuse disk bulge without spinal canal or neuroforaminal stenosis    L3-L4: Mild bilateral facet hypertrophy without spinal canal or neuroforaminal stenosis.    L4-L5: Severe bilateral facet arthropathy, greater on the right and mild uncovering of the intervertebral disk results in mild bilateral neural foraminal stenosis.  There is been prior bilateral laminectomy.    L5-S1: Severe bilateral facet arthropathy and an uncovering of the intervertebral disk with mild neuroforaminal narrowing.  No spinal canal stenosis.         Labs:  BMP  Lab  Results   Component Value Date     11/15/2017    K 4.2 11/15/2017     11/15/2017    CO2 21 (L) 11/15/2017    BUN 26 (H) 11/15/2017    CREATININE 1.9 (H) 11/15/2017    CALCIUM 8.9 11/15/2017    ANIONGAP 11 11/15/2017    ESTGFRAFRICA 37 (A) 11/15/2017    EGFRNONAA 32 (A) 11/15/2017     Lab Results   Component Value Date    ALT 14 02/04/2017    AST 23 02/04/2017     (H) 11/18/2007    ALKPHOS 94 02/04/2017    BILITOT 0.5 02/04/2017       Assessment:  Problem List Items Addressed This Visit     Chronic bilateral low back pain without sciatica    Acute midline low back pain without sciatica    Vertebral compression fracture - Primary    Relevant Orders    MRI Thoracic Spine Without Contrast          83-year-old male with chronic low back pain, COPD, chronic kidney disease, hypertension, and anxiety who presents complaining of acute low back pain that began after a fall in mid April 2018.  He has had low back pain in the past that responded well to facet interventions and is also status post sacroplasty in July 2017 with Dr. Mayen.  X-ray on April 24, 2018 shows a new compression fracture at T11 for which I will order an MRI and preparation for possible kyphoplasty.  There was a pre-existing compression fracture at L1 which has some associated retropulsion and is therefore a poor target for kyphoplasty at this time.    For now have scheduled the patient for a thoracolumbar medial branch block at T12 and L1 bilaterally; however, I may change the procedure to a kyphoplasty at T11 and vertebroplasty at L1.  I have advised the patient and his son that heavy lifting, falls, a bumpy car rides should be avoided.  I also asked the patient about use of a brace which she is unable to do because it affects his ability to breathe and he is already having difficulty with breathing due to COPD.     Treatment Plan:   PT/OT/HEP: None at this time  Procedures: Will schedule for bilat T12, L1 MBB though I may change this  to Kyphoplasty at T11 and Vertebroplasty at L1  Medications: No changes recommended at this time  Imaging: No additional imaging recommended at this time.  Labs: Reviewed.  Medications are appropriately dosed for current hepatorenal function.    Follow Up: RTC p inj    Alejandra Thomas Jr, MD  Interventional Pain Medicine / Anesthesiology    Disclaimer: This note was partly generated using dictation software which may occasionally result in transcription errors.

## 2018-05-28 ENCOUNTER — OFFICE VISIT (OUTPATIENT)
Dept: PAIN MEDICINE | Facility: CLINIC | Age: 83
End: 2018-05-28
Payer: MEDICARE

## 2018-05-28 VITALS
SYSTOLIC BLOOD PRESSURE: 118 MMHG | DIASTOLIC BLOOD PRESSURE: 64 MMHG | WEIGHT: 191 LBS | HEART RATE: 68 BPM | BODY MASS INDEX: 25.9 KG/M2

## 2018-05-28 DIAGNOSIS — G89.29 CHRONIC BILATERAL LOW BACK PAIN WITHOUT SCIATICA: ICD-10-CM

## 2018-05-28 DIAGNOSIS — M54.50 ACUTE MIDLINE LOW BACK PAIN WITHOUT SCIATICA: ICD-10-CM

## 2018-05-28 DIAGNOSIS — M54.50 CHRONIC BILATERAL LOW BACK PAIN WITHOUT SCIATICA: ICD-10-CM

## 2018-05-28 PROBLEM — M48.50XA VERTEBRAL COMPRESSION FRACTURE: Status: ACTIVE | Noted: 2018-05-28

## 2018-05-28 PROCEDURE — 3078F DIAST BP <80 MM HG: CPT | Mod: CPTII,S$GLB,, | Performed by: PAIN MEDICINE

## 2018-05-28 PROCEDURE — 99999 PR PBB SHADOW E&M-EST. PATIENT-LVL III: CPT | Mod: PBBFAC,,, | Performed by: PAIN MEDICINE

## 2018-05-28 PROCEDURE — 99499 UNLISTED E&M SERVICE: CPT | Mod: S$GLB,,, | Performed by: PAIN MEDICINE

## 2018-05-28 PROCEDURE — 3074F SYST BP LT 130 MM HG: CPT | Mod: CPTII,S$GLB,, | Performed by: PAIN MEDICINE

## 2018-05-28 PROCEDURE — 99214 OFFICE O/P EST MOD 30 MIN: CPT | Mod: S$GLB,,, | Performed by: PAIN MEDICINE

## 2018-05-28 NOTE — LETTER
May 28, 2018      Jose J Mayen MD  [address]  [address]  [facility] [address]           [facility]  [address]  [facility] [address]  Phone: [phone]          Patient: Leonid Delacruz Jr.   MR Number: [id number]   YOB: 1934   Date of Visit: 5/28/2018       Dear Dr. Jose J Mayen:    Thank you for referring Leonid Delacruz to me for evaluation. Attached you will find relevant portions of my assessment and plan of care.    If you have questions, please do not hesitate to call me. I look forward to following Leonid Delacruz along with you.    Sincerely,    Alejandra Thomas Jr., MD    Enclosure  CC:  No Recipients    If you would like to receive this communication electronically, please contact [email or web address] or [phone] to request more information on [employer] access.    For providers and/or their staff who would like to refer a patient to [facility], please contact us through our one-stop-shop provider referral line, [facility], at [phone].    If you feel you have received this communication in error or would no longer like to receive these types of communications, please e-mail [email or web address]

## 2018-05-30 ENCOUNTER — HOSPITAL ENCOUNTER (OUTPATIENT)
Dept: RADIOLOGY | Facility: HOSPITAL | Age: 83
Discharge: HOME | End: 2018-05-30
Attending: PAIN MEDICINE
Payer: MEDICARE

## 2018-05-30 PROCEDURE — 72146 MRI CHEST SPINE W/O DYE: CPT | Mod: TC

## 2018-05-30 PROCEDURE — 72146 MRI CHEST SPINE W/O DYE: CPT | Mod: 26,,, | Performed by: RADIOLOGY

## 2018-05-30 RX ORDER — AMLODIPINE BESYLATE 5 MG/1
5 TABLET ORAL DAILY
Qty: 30 TABLET | Refills: 3 | Status: SHIPPED | OUTPATIENT
Start: 2018-05-30 | End: 2018-10-12 | Stop reason: SDUPTHER

## 2018-05-30 RX ORDER — SERTRALINE HYDROCHLORIDE 100 MG/1
TABLET, FILM COATED ORAL
Qty: 90 TABLET | Refills: 0 | Status: SHIPPED | OUTPATIENT
Start: 2018-05-30 | End: 2018-09-07 | Stop reason: SDUPTHER

## 2018-05-30 NOTE — LETTER
May 30, 2018    Leonid Delacruz Jr.  [address] [place] [id number]             [place] - Internal Medicine  [address] [facility] [address]  Phone: [phone]  Fax: [phone] Dear Mr. Delacruz:    We recently refilled your Zoloft. Dr. Parkinson says you are due for your Annual visit / fasting labs. Please call to schedule @ [phone], phone staff would be happy to assist you.    If you have any questions or concerns, please don't hesitate to call.    Sincerely,    Sam Parkinson DO  [facility]

## 2018-06-04 NOTE — DISCHARGE INSTRUCTIONS
Home Care Instructions Pain Management:    1.  DIET:    You may resume your normal diet today.    2.  BATHING:    You may shower with luke warm water.    3.  DRESSING:    You may remove your bandage today.    4.  ACTIVITY LEVEL:      You may resume your normal activities 24 hours after your procedure.    5.  MEDICATIONS:    You may resume your normal medications today.    6.  SPECIAL INSTRUCTIONS:    No heat to the injection site for 24 hours including bath or shower, heating pad, moist heat or hot tubs.    Use an ice pack to the injection site for any pain or discomfort.  Apply ice packs for 20 minute intervals as needed.    If you have received any sedatives by mouth today, you can not drive for 12 hours.    If you have received sedation through an IV, you can not drive for 24 hours.    PLEASE CALL YOUR DOCTOR FOR THE FOLLOWIN.  Redness or swelling around the injection site.  2.  Fever of 101 degrees.  3.  Drainage (pus) from the injection site.  4.  For any continuous bleeding (some dried blood over the incision is normal.)    FOR EMERGENCIES:    If any unusual problems or difficulties occur during clinic hours, call [phone] or dial [phone].    Follow up with with your physician in 2-3 weeks.

## 2018-06-06 ENCOUNTER — TELEPHONE (OUTPATIENT)
Dept: PAIN MEDICINE | Facility: HOSPITAL | Age: 83
End: 2018-06-06

## 2018-06-07 ENCOUNTER — HOSPITAL ENCOUNTER (OUTPATIENT)
Facility: HOSPITAL | Age: 83
Discharge: HOME | End: 2018-06-07
Attending: PAIN MEDICINE | Admitting: PAIN MEDICINE
Payer: MEDICARE

## 2018-06-07 ENCOUNTER — SURGERY (OUTPATIENT)
Age: 83
End: 2018-06-07

## 2018-06-07 VITALS
DIASTOLIC BLOOD PRESSURE: 75 MMHG | HEART RATE: 81 BPM | SYSTOLIC BLOOD PRESSURE: 161 MMHG | RESPIRATION RATE: 15 BRPM | TEMPERATURE: 98 F | OXYGEN SATURATION: 97 % | BODY MASS INDEX: 25.87 KG/M2 | WEIGHT: 191 LBS | HEIGHT: 72 IN

## 2018-06-07 DIAGNOSIS — M47.814 THORACIC SPONDYLOSIS: Primary | ICD-10-CM

## 2018-06-07 PROCEDURE — 25500020 PHARM REV CODE 255: Performed by: PAIN MEDICINE

## 2018-06-07 PROCEDURE — 25000003 PHARM REV CODE 250: Performed by: PAIN MEDICINE

## 2018-06-07 PROCEDURE — 64493 INJ PARAVERT F JNT L/S 1 LEV: CPT | Mod: 50 | Performed by: PAIN MEDICINE

## 2018-06-07 PROCEDURE — 64490 INJ PARAVERT F JNT C/T 1 LEV: CPT | Mod: 50 | Performed by: PAIN MEDICINE

## 2018-06-07 PROCEDURE — 63600175 PHARM REV CODE 636 W HCPCS: Performed by: PAIN MEDICINE

## 2018-06-07 PROCEDURE — 64490 INJ PARAVERT F JNT C/T 1 LEV: CPT | Mod: 50,,, | Performed by: PAIN MEDICINE

## 2018-06-07 RX ORDER — LIDOCAINE HYDROCHLORIDE 10 MG/ML
INJECTION INFILTRATION; PERINEURAL
Status: DISCONTINUED | OUTPATIENT
Start: 2018-06-07 | End: 2018-06-07 | Stop reason: HOSPADM

## 2018-06-07 RX ORDER — TRIAMCINOLONE ACETONIDE 40 MG/ML
INJECTION, SUSPENSION INTRA-ARTICULAR; INTRAMUSCULAR
Status: DISCONTINUED | OUTPATIENT
Start: 2018-06-07 | End: 2018-06-07 | Stop reason: HOSPADM

## 2018-06-07 RX ORDER — BUPIVACAINE HYDROCHLORIDE 2.5 MG/ML
INJECTION, SOLUTION EPIDURAL; INFILTRATION; INTRACAUDAL
Status: DISCONTINUED | OUTPATIENT
Start: 2018-06-07 | End: 2018-06-07 | Stop reason: HOSPADM

## 2018-06-07 RX ORDER — ALPRAZOLAM 0.5 MG/1
0.5 TABLET, ORALLY DISINTEGRATING ORAL ONCE AS NEEDED
Status: DISCONTINUED | OUTPATIENT
Start: 2018-06-07 | End: 2018-06-07 | Stop reason: HOSPADM

## 2018-06-07 RX ADMIN — TRIAMCINOLONE ACETONIDE 40 MG: 40 INJECTION, SUSPENSION INTRA-ARTICULAR; INTRAMUSCULAR at 03:06

## 2018-06-07 RX ADMIN — IOHEXOL 5 ML: 300 INJECTION, SOLUTION INTRAVENOUS at 03:06

## 2018-06-07 RX ADMIN — LIDOCAINE HYDROCHLORIDE 5 ML: 10 INJECTION, SOLUTION INFILTRATION; PERINEURAL at 03:06

## 2018-06-07 RX ADMIN — BUPIVACAINE HYDROCHLORIDE 5 ML: 2.5 INJECTION, SOLUTION EPIDURAL; INFILTRATION; INTRACAUDAL; PERINEURAL at 03:06

## 2018-06-07 NOTE — OP NOTE
Procedure Note    Pre-operative diagnosis: Thoracic spondylosis  Post-operative diagnosis: Thoracic spondylosis  Procedure Date: 06/07/2018  Procedure: BILATERAL T11, T12 Thoracolumbar Medial Branch Block under fluoroscopic guidance    Procedure in detail:  Informed consent obtained after explaining the procedure and potential complications including local discomfort, infection, headache, temporary or permanent weakness and/or numbness of one or both legs, temporary or permanent paraplegia, heart attack and stroke. All questions were answered.      Patient was taken to the procedure room and placed in prone position.  Time out was performed.  Patient's Lumbar area was prepped and draped in a sterile manner.  AP and oblique fluoroscopy were used to identify and frank the junctions between the superior articular processes and transverse processes at the T12 and L1 vertebral levels Bilaterally.  Then, under fluoroscopic guidance, a 22 G 3.5 inch spinal needle, was advanced to the targeted points corresponding with the locations of the T11 and T12 medial branches.    Then, after negative aspiration for heme, 0.2 mL of contrast was administered demonstrating appropriate spread for medial branch coverage.  Next, 0.5 mL of a mixture of Kenalog 40 mg and 0.25% bupivacaine was injected at each of the levels.    Needle was removed with flush and bandaged placed over site of needle insertion.    Estimated Blood Loss: None  Specimens: None    Disposition: The patient tolerated the procedure without complaint and was transported to the recovery room in stable condition.    Follow-up: Return for RFA if appropriate      Alejandra Thomas Jr, MD  Interventional Pain Medicine / Anesthesiology

## 2018-06-07 NOTE — DISCHARGE SUMMARY
[facility]  Discharge Note  Short Stay     Admit Date: 6/7/2018    Discharge Date: 6/7/2018     Attending Physician: Alejandra Thomas Jr, MD    Diagnoses:  Active Hospital Problems    Diagnosis  POA    *Thoracic spondylosis [M47.814]  Yes      Resolved Hospital Problems    Diagnosis Date Resolved POA   No resolved problems to display.     Discharged Condition: Good     Hospital Course: Patient was admitted for an outpatient interventional pain management procedure and tolerated the procedure well with no complications.     Final Diagnoses: Same as principal problem.     Disposition: Home or Self Care     Follow up/Patient Instructions:    Follow-up Information     Alejandra Thomas Jr, MD. Go in 3 weeks.    Specialty:  Pain Medicine  Why:  Post-procedural Follow Up As Scheduled, Call to make an appointment if you do not have one  Contact information:  [address]  [address]  [place] [id number] [phone]                   Reconciled Medications:     Medication List      CONTINUE taking these medications    albuterol 90 mcg/actuation inhaler  Commonly known as:  PROAIR HFA  2 HFA Aerosol Inhaler Inhalation Four times a day p;rn     alendronate 10 MG Tab  Commonly known as:  FOSAMAX     amLODIPine 5 MG tablet  Commonly known as:  NORVASC  Take 1 tablet (5 mg total) by mouth once daily.     aspirin 81 MG Chew  Take 81 mg by mouth once daily.     calcitonin (salmon) 200 unit/actuation nasal spray  Commonly known as:  FORTICAL  1 spray by Nasal route once daily.     calcium carbonate 500 mg calcium (1,250 mg) tablet  Commonly known as:  OS-JOANA  Take 1 tablet (500 mg total) by mouth 2 (two) times daily.     celecoxib 200 MG capsule  Commonly known as:  CeleBREX  TAKE 1 CAPSULE(200 MG) BY MOUTH TWICE DAILY     cholecalciferol (vitamin D3) 400 unit Cap  Take 1 capsule (400 Units total) by mouth 2 (two) times daily.     GEL-LUCIE 0.4 % Gel  Generic drug:  stannous fluoride  BRUSH ON NIGHTLY.   SPIT OUT  EXCESS AND DO NOT RINSE AFTERWARD     omeprazole 40 MG capsule  Commonly known as:  PRILOSEC  TAKE 1 CAPSULE BY MOUTH TWICE DAILY BEFORE MEALS     sertraline 100 MG tablet  Commonly known as:  ZOLOFT  TAKE ONE TABLET BY MOUTH ONCE DAILY.     SYMBICORT 160-4.5 mcg/actuation Hfaa  Generic drug:  budesonide-formoterol 160-4.5 mcg  INHALE 2 PUFFS INTO LUNGS EVERY 12 HOURS     tamsulosin 0.4 mg Cp24  Commonly known as:  FLOMAX  TAKE 1 CAPSULE BY MOUTH DAILY     traMADol 50 mg tablet  Commonly known as:  ULTRAM  Take 1-2 tablets (50 mg - 100 mg total) by mouth every 4 (four) - 6 (six) hours as needed for pain            Discharge Procedure Orders (must include Diet, Follow-up, Activity)  Call MD for:  temperature >100.4     Call MD for:  severe uncontrolled pain     Call MD for:  redness, tenderness, or signs of infection (pain, swelling, redness, odor or green/yellow discharge around incision site)     Call MD for:  difficulty breathing or increased cough     Call MD for:  severe persistent headache     Call MD for:  worsening rash     Remove dressing in 24 hours       Alejandra Thomas Jr, MD  Interventional Pain Medicine / Anesthesiology

## 2018-06-08 ENCOUNTER — TELEPHONE (OUTPATIENT)
Dept: INTERNAL MEDICINE | Facility: CLINIC | Age: 83
End: 2018-06-08

## 2018-06-08 DIAGNOSIS — E78.5 DYSLIPIDEMIA: ICD-10-CM

## 2018-06-08 DIAGNOSIS — R53.83 FATIGUE, UNSPECIFIED TYPE: ICD-10-CM

## 2018-06-08 DIAGNOSIS — Z00.00 ANNUAL PHYSICAL EXAM: Primary | ICD-10-CM

## 2018-06-08 NOTE — TELEPHONE ENCOUNTER
----- Message from Mavis Arnold sent at 6/8/2018  9:53 AM CDT -----  Contact: SELF/ [phone]  Doctor appointment and lab have been scheduled.  Please link lab orders to the lab appointment.  Date of doctor appointment:  6/21  Physical or EP:  PHYS  Date of lab appointment:  6/14  Comments:

## 2018-06-14 ENCOUNTER — LAB VISIT (OUTPATIENT)
Dept: LAB | Facility: HOSPITAL | Age: 83
End: 2018-06-14
Attending: INTERNAL MEDICINE
Payer: MEDICARE

## 2018-06-14 ENCOUNTER — PES CALL (OUTPATIENT)
Dept: ADMINISTRATIVE | Facility: CLINIC | Age: 83
End: 2018-06-14

## 2018-06-14 DIAGNOSIS — E78.5 DYSLIPIDEMIA: ICD-10-CM

## 2018-06-14 DIAGNOSIS — R53.83 FATIGUE, UNSPECIFIED TYPE: ICD-10-CM

## 2018-06-14 LAB
ALBUMIN SERPL BCP-MCNC: 3.7 G/DL
ALP SERPL-CCNC: 111 U/L
ALT SERPL W/O P-5'-P-CCNC: 7 U/L
ANION GAP SERPL CALC-SCNC: 12 MMOL/L
AST SERPL-CCNC: 15 U/L
BASOPHILS # BLD AUTO: 0.05 K/UL
BASOPHILS NFR BLD: 0.5 %
BILIRUB SERPL-MCNC: 0.4 MG/DL
BUN SERPL-MCNC: 31 MG/DL
CALCIUM SERPL-MCNC: 9.5 MG/DL
CHLORIDE SERPL-SCNC: 103 MMOL/L
CHOLEST SERPL-MCNC: 175 MG/DL
CHOLEST/HDLC SERPL: 2.5 {RATIO}
CO2 SERPL-SCNC: 21 MMOL/L
CREAT SERPL-MCNC: 1.9 MG/DL
DIFFERENTIAL METHOD: ABNORMAL
EOSINOPHIL # BLD AUTO: 0.1 K/UL
EOSINOPHIL NFR BLD: 0.7 %
ERYTHROCYTE [DISTWIDTH] IN BLOOD BY AUTOMATED COUNT: 17.9 %
EST. GFR  (AFRICAN AMERICAN): 36.9 ML/MIN/1.73 M^2
EST. GFR  (NON AFRICAN AMERICAN): 31.9 ML/MIN/1.73 M^2
GLUCOSE SERPL-MCNC: 93 MG/DL
HCT VFR BLD AUTO: 37.6 %
HDLC SERPL-MCNC: 71 MG/DL
HDLC SERPL: 40.6 %
HGB BLD-MCNC: 11.7 G/DL
IMM GRANULOCYTES # BLD AUTO: 0.04 K/UL
IMM GRANULOCYTES NFR BLD AUTO: 0.4 %
LDLC SERPL CALC-MCNC: 91.2 MG/DL
LYMPHOCYTES # BLD AUTO: 3.6 K/UL
LYMPHOCYTES NFR BLD: 35 %
MCH RBC QN AUTO: 27.2 PG
MCHC RBC AUTO-ENTMCNC: 31.1 G/DL
MCV RBC AUTO: 87 FL
MONOCYTES # BLD AUTO: 1.3 K/UL
MONOCYTES NFR BLD: 12.5 %
NEUTROPHILS # BLD AUTO: 5.3 K/UL
NEUTROPHILS NFR BLD: 50.9 %
NONHDLC SERPL-MCNC: 104 MG/DL
NRBC BLD-RTO: 0 /100 WBC
PLATELET # BLD AUTO: 426 K/UL
PMV BLD AUTO: 9.4 FL
POTASSIUM SERPL-SCNC: 4.7 MMOL/L
PROT SERPL-MCNC: 7.6 G/DL
RBC # BLD AUTO: 4.3 M/UL
SODIUM SERPL-SCNC: 136 MMOL/L
T4 FREE SERPL-MCNC: 0.92 NG/DL
TRIGL SERPL-MCNC: 64 MG/DL
TSH SERPL DL<=0.005 MIU/L-ACNC: 6.78 UIU/ML
WBC # BLD AUTO: 10.36 K/UL

## 2018-06-14 PROCEDURE — 85025 COMPLETE CBC W/AUTO DIFF WBC: CPT

## 2018-06-14 PROCEDURE — 36415 COLL VENOUS BLD VENIPUNCTURE: CPT | Mod: PO

## 2018-06-14 PROCEDURE — 84439 ASSAY OF FREE THYROXINE: CPT

## 2018-06-14 PROCEDURE — 80061 LIPID PANEL: CPT

## 2018-06-14 PROCEDURE — 84443 ASSAY THYROID STIM HORMONE: CPT

## 2018-06-14 PROCEDURE — 80053 COMPREHEN METABOLIC PANEL: CPT

## 2018-06-20 ENCOUNTER — TELEPHONE (OUTPATIENT)
Dept: PAIN MEDICINE | Facility: CLINIC | Age: 83
End: 2018-06-20

## 2018-06-20 NOTE — TELEPHONE ENCOUNTER
----- Message from Katya Ruvalcaba LPN sent at 6/20/2018  9:39 AM CDT -----  Spoke with patient regarding procedure that was scheduled on 6/7/18. Patient reported 75 % of relief from Thoracic Medial Branch Block. Patient had some increase with activity for about 8 days and rate pain level at 3/10 at this time. Patient stated that he feels much better and that when he starts feeling pain sitting for a while helps.

## 2018-06-20 NOTE — TELEPHONE ENCOUNTER
Spoke with patient regarding procedure that was scheduled on 6/7/18. Patient reported 75 % of relief from Thoracic Medial Branch Block. Patient had some increase with activity for about 8 days and rate pain level at 3/10 at this time. Patient stated that he feels much better and that when he starts feeling pain sitting for a while helps. Patient was informed that the message will be sent to Dr. Thomsa regarding this matter. Patient verbalized understanding.

## 2018-06-20 NOTE — TELEPHONE ENCOUNTER
Given that his pain is stable and controlled, we can delay performing the radiofrequency ablation for now.    Alejandra Thomas Jr, MD  Interventional Pain Medicine / Anesthesiology

## 2018-06-21 ENCOUNTER — OFFICE VISIT (OUTPATIENT)
Dept: INTERNAL MEDICINE | Facility: CLINIC | Age: 83
End: 2018-06-21
Payer: MEDICARE

## 2018-06-21 VITALS
DIASTOLIC BLOOD PRESSURE: 70 MMHG | HEART RATE: 96 BPM | RESPIRATION RATE: 16 BRPM | SYSTOLIC BLOOD PRESSURE: 122 MMHG | BODY MASS INDEX: 24.18 KG/M2 | TEMPERATURE: 98 F | HEIGHT: 72 IN | WEIGHT: 178.56 LBS

## 2018-06-21 DIAGNOSIS — N40.0 BENIGN NON-NODULAR PROSTATIC HYPERPLASIA WITHOUT LOWER URINARY TRACT SYMPTOMS: ICD-10-CM

## 2018-06-21 DIAGNOSIS — E78.5 DYSLIPIDEMIA: ICD-10-CM

## 2018-06-21 DIAGNOSIS — N18.30 CKD (CHRONIC KIDNEY DISEASE) STAGE 3, GFR 30-59 ML/MIN: ICD-10-CM

## 2018-06-21 DIAGNOSIS — R42 VERTIGO: ICD-10-CM

## 2018-06-21 DIAGNOSIS — I70.0 ATHEROSCLEROSIS OF AORTA: ICD-10-CM

## 2018-06-21 DIAGNOSIS — J43.9 PULMONARY EMPHYSEMA, UNSPECIFIED EMPHYSEMA TYPE: Chronic | ICD-10-CM

## 2018-06-21 DIAGNOSIS — T83.490S MALFUNCTION OF PENILE PROSTHESIS, SEQUELA: ICD-10-CM

## 2018-06-21 DIAGNOSIS — M47.816 FACET HYPERTROPHY OF LUMBAR REGION: ICD-10-CM

## 2018-06-21 DIAGNOSIS — D69.2 SENILE PURPURA: ICD-10-CM

## 2018-06-21 DIAGNOSIS — Z00.00 ANNUAL PHYSICAL EXAM: Primary | ICD-10-CM

## 2018-06-21 DIAGNOSIS — I50.32 CHRONIC DIASTOLIC HEART FAILURE: ICD-10-CM

## 2018-06-21 DIAGNOSIS — R29.6 FREQUENT FALLS: ICD-10-CM

## 2018-06-21 DIAGNOSIS — M47.816 LUMBAR ARTHROPATHY: ICD-10-CM

## 2018-06-21 DIAGNOSIS — F33.1 MDD (MAJOR DEPRESSIVE DISORDER), RECURRENT EPISODE, MODERATE: ICD-10-CM

## 2018-06-21 DIAGNOSIS — I10 HTN (HYPERTENSION), BENIGN: ICD-10-CM

## 2018-06-21 DIAGNOSIS — D75.839 THROMBOCYTOSIS: ICD-10-CM

## 2018-06-21 DIAGNOSIS — E03.9 HYPOTHYROIDISM, UNSPECIFIED TYPE: ICD-10-CM

## 2018-06-21 PROBLEM — M54.50 ACUTE MIDLINE LOW BACK PAIN WITHOUT SCIATICA: Status: RESOLVED | Noted: 2018-05-28 | Resolved: 2018-06-21

## 2018-06-21 PROCEDURE — 99499 UNLISTED E&M SERVICE: CPT | Mod: S$GLB,,, | Performed by: INTERNAL MEDICINE

## 2018-06-21 PROCEDURE — 3078F DIAST BP <80 MM HG: CPT | Mod: CPTII,S$GLB,, | Performed by: INTERNAL MEDICINE

## 2018-06-21 PROCEDURE — 99999 PR PBB SHADOW E&M-EST. PATIENT-LVL III: CPT | Mod: PBBFAC,,, | Performed by: INTERNAL MEDICINE

## 2018-06-21 PROCEDURE — 99397 PER PM REEVAL EST PAT 65+ YR: CPT | Mod: S$GLB,,, | Performed by: INTERNAL MEDICINE

## 2018-06-21 PROCEDURE — 3074F SYST BP LT 130 MM HG: CPT | Mod: CPTII,S$GLB,, | Performed by: INTERNAL MEDICINE

## 2018-06-21 RX ORDER — LEVOTHYROXINE SODIUM 75 UG/1
75 TABLET ORAL DAILY
Qty: 30 TABLET | Refills: 11 | Status: SHIPPED | OUTPATIENT
Start: 2018-06-21 | End: 2019-06-10 | Stop reason: SDUPTHER

## 2018-06-21 RX ORDER — TRAMADOL HYDROCHLORIDE 50 MG/1
TABLET ORAL
Qty: 80 TABLET | Refills: 5 | Status: SHIPPED | OUTPATIENT
Start: 2018-06-21 | End: 2018-09-07 | Stop reason: SDUPTHER

## 2018-06-21 NOTE — PROGRESS NOTES
Subjective:       Patient ID: Leonid Delacruz Jr. is a 83 y.o. male.    Chief Complaint: Annual Exam (see labs to review)    HPI   83 y.o. Male with COPD/Emphysema, chronic diastolic heart failure, CKD III, MDD, Aortic atherosclerosis, senile purpura, vertigo, malfunction of penile prosthesis, Lumbar arthropathy with L1 compression fracture, aortic sclerosis, BPH, GERD, hypothyroidism, thrombocytosis here for annual exam.      Cholesterol: (normal)  Vaccines: Influenza (declined); Tetanus (2015); Pneumovax (2017); Zoster (declined)  Eye exam: 2016  Colonoscopy: 2011- pt declines repeat     Exercise: no  Diet: regular     Past Medical History:    Amblyopia, strabismic - Left Eye                1/7/2014      Anxiety                                                       AR (allergic rhinitis)                                        Cataract                                                      COPD (chronic obstructive pulmonary disease)                  Gastroesophageal reflux disease with hiatal he*               History of bleeding peptic ulcer                5/02/2010     History of gastritis                            9/22/2009       Comment:with hemorrhage    MDD (major depressive disorder), recurrent epi* 3/3/2016      Osteoarthritis                                                PUD (peptic ulcer disease)                                      Comment:stomach  Past Surgical History:    Left ankle fracture surgery    CATARACT EXTRACTION W/  INTRAOCULAR LENS IMPLA*  8/20/2001*    LAMINOTOMY                                       5/20/2003       Comment:Left L4-L5    APPENDECTOMY                                                   TONSILLECTOMY, ADENOIDECTOMY                                   PENILE PROSTHESIS IMPLANT                                      VASECTOMY                                                      ROTATOR CUFF REPAIR                                              Comment:Bilateral    COLONOSCOPY                                       6/06/2011     ESOPHAGOGASTRODUODENOSCOPY                       5/02/2010*  Social History    Marital status:              Spouse name: Ting                 Years of education:                 Number of children: 2              Occupational History  Occupation          Employer            Comment               Retired                                      Social History Main Topics    Smoking status: Never Smoker                                                                 Smokeless status: Never Used                        Alcohol use: Yes           4.2 oz/week       7 Cans of beer per week       Comment: 7-9 beers a week    Drug use: No              Sexual activity: Yes               Partners with: Female     Review of patient's allergies indicates:  No Known Allergies  Mr. Delacruz does not currently have medications on file.  Review of Systems   Constitutional: Negative for activity change, appetite change, chills, diaphoresis, fatigue, fever and unexpected weight change.   HENT: Negative for congestion, mouth sores, postnasal drip, rhinorrhea, sinus pressure, sneezing, sore throat, trouble swallowing and voice change.    Eyes: Negative for discharge, itching and visual disturbance.   Respiratory: Positive for shortness of breath. Negative for cough, chest tightness and wheezing.    Cardiovascular: Negative for chest pain, palpitations and leg swelling.   Gastrointestinal: Negative for abdominal pain, blood in stool, constipation, diarrhea, nausea and vomiting.   Endocrine: Negative for cold intolerance and heat intolerance.   Genitourinary: Negative for difficulty urinating, dysuria, flank pain, hematuria and urgency.   Musculoskeletal: Positive for arthralgias and back pain. Negative for myalgias and neck pain.   Skin: Negative for rash and wound.   Allergic/Immunologic: Negative for environmental allergies and food allergies.   Neurological: Negative for dizziness,  tremors, seizures, syncope, weakness and headaches.   Hematological: Negative for adenopathy. Does not bruise/bleed easily.   Psychiatric/Behavioral: Positive for dysphoric mood. Negative for confusion, sleep disturbance and suicidal ideas. The patient is not nervous/anxious.        Objective:      Physical Exam   Constitutional: He is oriented to person, place, and time. He appears well-developed and well-nourished. No distress.   HENT:   Head: Normocephalic and atraumatic.   Right Ear: External ear normal.   Left Ear: External ear normal.   Nose: Nose normal.   Mouth/Throat: Oropharynx is clear and moist. No oropharyngeal exudate.   Eyes: Conjunctivae and EOM are normal. Pupils are equal, round, and reactive to light. Right eye exhibits no discharge. Left eye exhibits no discharge. No scleral icterus.   Neck: Normal range of motion. Neck supple. No JVD present. No thyromegaly present.   Cardiovascular: Normal rate, regular rhythm, normal heart sounds and intact distal pulses.    No murmur heard.  Pulmonary/Chest: Effort normal and breath sounds normal. No respiratory distress. He has no wheezes. He has no rales.   Abdominal: Soft. Bowel sounds are normal. He exhibits no distension. There is no tenderness. There is no guarding.   Musculoskeletal: He exhibits no edema.        Thoracic back: He exhibits tenderness.        Lumbar back: He exhibits tenderness.   Lymphadenopathy:     He has no cervical adenopathy.   Neurological: He is alert and oriented to person, place, and time. No cranial nerve deficit. Coordination normal.   Skin: Skin is warm and dry. No rash noted. He is not diaphoretic. No pallor.   Psychiatric: He has a normal mood and affect. Judgment normal.       Assessment:       1. Annual physical exam    2. Pulmonary emphysema, unspecified emphysema type    3. Chronic diastolic heart failure    4. CKD (chronic kidney disease) stage 3, GFR 30-59 ml/min    5. MDD (major depressive disorder), recurrent  episode, moderate    6. Vertigo    7. Atherosclerosis of aorta    8. Senile purpura    9. Malfunction of penile prosthesis, sequela    10. Facet hypertrophy of lumbar region    11. Lumbar arthropathy    12. Dyslipidemia    13. HTN (hypertension), benign    14. Benign non-nodular prostatic hyperplasia without lower urinary tract symptoms    15. Mild aortic sclerosis    16. Frequent falls    17. Hypothyroidism, unspecified type    18. Thrombocytosis        Plan:    Blood work reviewed with pt    Vaccines: Influenza (declined); Tetanus (2015); Pneumovax (2017); Zoster (declined)   Eye exam: 2016   Colonoscopy: 2011- pt declines repeat      1. COPD- fair control on Symbicort BID   2. Chronic diastolic heart failure with mild pulm HTN- stable, no S/S of heart failure   3. CKD III- stable, no NSAIDs   4. MDD- stable on Zoloft 100 mg daily   5. BPPV- fair control on Meclizine   6. Aortic atherosclerosis- stable on ASA   7. Senile purpura- stable, continue to monitor    8. Malfunction of penile prosthesis- stable, followed by Urology   9. Lumbar arthropathy/ L1 compression fracture/Sacral fracture- fair control on Tramadol PRN        Followed by Pain Management. Pt will see Neurosurgery today   10. HTN- stable, CPT  11. Aortic sclerosis- stable, continue to monitor   11. BPH- stable on Flomax  12. GERD- stable on Prilosec  13. Frequent falls- none recently, pt did not f/u with Neurology   14. Thrombocytosis- will follow   15. Hypothyroidism- Rx Synthroid 75 mcg daily        TSH/FT4 in 1 month   16. F/u in 6 months

## 2018-07-05 ENCOUNTER — OFFICE VISIT (OUTPATIENT)
Dept: INTERNAL MEDICINE | Facility: CLINIC | Age: 83
End: 2018-07-05
Payer: MEDICARE

## 2018-07-05 VITALS
SYSTOLIC BLOOD PRESSURE: 134 MMHG | DIASTOLIC BLOOD PRESSURE: 80 MMHG | WEIGHT: 184.31 LBS | BODY MASS INDEX: 24.96 KG/M2 | HEIGHT: 72 IN | TEMPERATURE: 98 F | HEART RATE: 99 BPM | RESPIRATION RATE: 18 BRPM

## 2018-07-05 DIAGNOSIS — K44.9 GASTROESOPHAGEAL REFLUX DISEASE WITH HIATAL HERNIA: ICD-10-CM

## 2018-07-05 DIAGNOSIS — K21.9 GASTROESOPHAGEAL REFLUX DISEASE WITH HIATAL HERNIA: ICD-10-CM

## 2018-07-05 DIAGNOSIS — R07.9 CHEST PAIN, UNSPECIFIED TYPE: Primary | ICD-10-CM

## 2018-07-05 PROCEDURE — 93010 ELECTROCARDIOGRAM REPORT: CPT | Mod: S$GLB,,, | Performed by: INTERNAL MEDICINE

## 2018-07-05 PROCEDURE — 3079F DIAST BP 80-89 MM HG: CPT | Mod: CPTII,S$GLB,, | Performed by: INTERNAL MEDICINE

## 2018-07-05 PROCEDURE — 93005 ELECTROCARDIOGRAM TRACING: CPT | Mod: S$GLB,,, | Performed by: INTERNAL MEDICINE

## 2018-07-05 PROCEDURE — 3075F SYST BP GE 130 - 139MM HG: CPT | Mod: CPTII,S$GLB,, | Performed by: INTERNAL MEDICINE

## 2018-07-05 PROCEDURE — 99999 PR PBB SHADOW E&M-EST. PATIENT-LVL III: CPT | Mod: PBBFAC,,, | Performed by: INTERNAL MEDICINE

## 2018-07-05 PROCEDURE — 99214 OFFICE O/P EST MOD 30 MIN: CPT | Mod: S$GLB,,, | Performed by: INTERNAL MEDICINE

## 2018-07-05 NOTE — PROGRESS NOTES
Subjective:       Patient ID: Leonid Delacruz Jr. is a 83 y.o. male.    Chief Complaint: Chest Pain (milx 1 month) and Shortness of Breath    HPI   Pt with COPD, Diastolic heart failure, Pulm HTN is here for evaluation of 1 month of intermittent B/L upper chest pain described as burning with dullness mostly at night while lying flat in his bed. It can last from one to a few hours at a time. No relation to activity and no additional cardiac symptoms. He is on Prilosec 40 mg BID for GERD.   Review of Systems   Constitutional: Negative for activity change, appetite change, chills, diaphoresis, fatigue, fever and unexpected weight change.   HENT: Negative for postnasal drip, rhinorrhea, sinus pressure, sneezing, sore throat, trouble swallowing and voice change.    Respiratory: Negative for cough, shortness of breath and wheezing.    Cardiovascular: Positive for chest pain. Negative for palpitations and leg swelling.   Gastrointestinal: Negative for abdominal pain, blood in stool, constipation, diarrhea, nausea and vomiting.   Genitourinary: Negative for dysuria.   Musculoskeletal: Negative for arthralgias and myalgias.   Skin: Negative for rash and wound.   Allergic/Immunologic: Negative for environmental allergies and food allergies.   Hematological: Negative for adenopathy. Does not bruise/bleed easily.       Objective:      Physical Exam   Constitutional: He is oriented to person, place, and time. He appears well-developed and well-nourished. No distress.   HENT:   Head: Normocephalic and atraumatic.   Right Ear: External ear normal.   Left Ear: External ear normal.   Nose: Nose normal.   Mouth/Throat: Oropharynx is clear and moist. No oropharyngeal exudate.   Eyes: Conjunctivae and EOM are normal. Pupils are equal, round, and reactive to light. Right eye exhibits no discharge. Left eye exhibits no discharge. No scleral icterus.   Neck: Normal range of motion. Neck supple. No JVD present.   Cardiovascular: Normal  rate, regular rhythm and normal heart sounds.    No murmur heard.  Pulmonary/Chest: Effort normal and breath sounds normal. No respiratory distress. He has no wheezes. He has no rales.   Abdominal: Soft. Bowel sounds are normal. There is no tenderness.   Musculoskeletal: He exhibits no edema.   Lymphadenopathy:     He has no cervical adenopathy.   Neurological: He is alert and oriented to person, place, and time.   Skin: Skin is warm and dry. No rash noted. He is not diaphoretic. No pallor.       Assessment:       1. Chest pain, unspecified type    2. Gastroesophageal reflux disease with hiatal hernia        Plan:    1. Check EKG- no acute changes when compared to last EKG       Suspect 2/2 GERD as it only takes place while lying flat in the bed   2. Add Zantac 150 mg BID and continue prilosec       Pt advised to elevate the head of the bed at night

## 2018-07-10 RX ORDER — TAMSULOSIN HYDROCHLORIDE 0.4 MG/1
CAPSULE ORAL
Qty: 90 CAPSULE | Refills: 3 | Status: SHIPPED | OUTPATIENT
Start: 2018-07-10 | End: 2019-07-10 | Stop reason: SDUPTHER

## 2018-07-19 ENCOUNTER — LAB VISIT (OUTPATIENT)
Dept: LAB | Facility: HOSPITAL | Age: 83
End: 2018-07-19
Attending: INTERNAL MEDICINE
Payer: MEDICARE

## 2018-07-19 ENCOUNTER — OFFICE VISIT (OUTPATIENT)
Dept: INTERNAL MEDICINE | Facility: CLINIC | Age: 83
End: 2018-07-19
Payer: MEDICARE

## 2018-07-19 VITALS
HEART RATE: 84 BPM | HEIGHT: 72 IN | SYSTOLIC BLOOD PRESSURE: 112 MMHG | BODY MASS INDEX: 24.79 KG/M2 | WEIGHT: 183 LBS | DIASTOLIC BLOOD PRESSURE: 62 MMHG

## 2018-07-19 DIAGNOSIS — E78.5 DYSLIPIDEMIA: ICD-10-CM

## 2018-07-19 DIAGNOSIS — I10 HTN (HYPERTENSION), BENIGN: ICD-10-CM

## 2018-07-19 DIAGNOSIS — D69.2 SENILE PURPURA: ICD-10-CM

## 2018-07-19 DIAGNOSIS — I70.0 ATHEROSCLEROSIS OF AORTA: ICD-10-CM

## 2018-07-19 DIAGNOSIS — Z00.00 ENCOUNTER FOR PREVENTIVE HEALTH EXAMINATION: Primary | ICD-10-CM

## 2018-07-19 DIAGNOSIS — I27.20 PULMONARY HYPERTENSION: ICD-10-CM

## 2018-07-19 DIAGNOSIS — M80.08XS FRACTURE OF VERTEBRA DUE TO OSTEOPOROSIS, SEQUELA: ICD-10-CM

## 2018-07-19 DIAGNOSIS — J43.9 PULMONARY EMPHYSEMA, UNSPECIFIED EMPHYSEMA TYPE: Chronic | ICD-10-CM

## 2018-07-19 DIAGNOSIS — N18.30 CKD (CHRONIC KIDNEY DISEASE) STAGE 3, GFR 30-59 ML/MIN: ICD-10-CM

## 2018-07-19 DIAGNOSIS — E03.9 HYPOTHYROIDISM, UNSPECIFIED TYPE: ICD-10-CM

## 2018-07-19 DIAGNOSIS — N40.0 BENIGN NON-NODULAR PROSTATIC HYPERPLASIA WITHOUT LOWER URINARY TRACT SYMPTOMS: ICD-10-CM

## 2018-07-19 DIAGNOSIS — I08.0 MILD MITRAL AND AORTIC REGURGITATION: ICD-10-CM

## 2018-07-19 DIAGNOSIS — D75.839 THROMBOCYTOSIS: ICD-10-CM

## 2018-07-19 DIAGNOSIS — I50.32 CHRONIC DIASTOLIC HEART FAILURE: ICD-10-CM

## 2018-07-19 DIAGNOSIS — M15.9 PRIMARY OSTEOARTHRITIS INVOLVING MULTIPLE JOINTS: ICD-10-CM

## 2018-07-19 DIAGNOSIS — N52.9 ERECTILE DYSFUNCTION, UNSPECIFIED ERECTILE DYSFUNCTION TYPE: ICD-10-CM

## 2018-07-19 DIAGNOSIS — F33.1 MDD (MAJOR DEPRESSIVE DISORDER), RECURRENT EPISODE, MODERATE: ICD-10-CM

## 2018-07-19 DIAGNOSIS — M47.816 LUMBAR ARTHROPATHY: ICD-10-CM

## 2018-07-19 PROBLEM — S82.892A CLOSED LEFT ANKLE FRACTURE: Status: RESOLVED | Noted: 2017-11-16 | Resolved: 2018-07-19

## 2018-07-19 PROBLEM — S32.10XA CLOSED FRACTURE OF SACRUM: Status: RESOLVED | Noted: 2017-06-05 | Resolved: 2018-07-19

## 2018-07-19 LAB
T4 FREE SERPL-MCNC: 1.01 NG/DL
TSH SERPL DL<=0.005 MIU/L-ACNC: 2.63 UIU/ML

## 2018-07-19 PROCEDURE — 84443 ASSAY THYROID STIM HORMONE: CPT

## 2018-07-19 PROCEDURE — 99999 PR PBB SHADOW E&M-EST. PATIENT-LVL IV: CPT | Mod: PBBFAC,,, | Performed by: NURSE PRACTITIONER

## 2018-07-19 PROCEDURE — G0439 PPPS, SUBSEQ VISIT: HCPCS | Mod: S$GLB,,, | Performed by: NURSE PRACTITIONER

## 2018-07-19 PROCEDURE — 3074F SYST BP LT 130 MM HG: CPT | Mod: CPTII,S$GLB,, | Performed by: NURSE PRACTITIONER

## 2018-07-19 PROCEDURE — 3078F DIAST BP <80 MM HG: CPT | Mod: CPTII,S$GLB,, | Performed by: NURSE PRACTITIONER

## 2018-07-19 PROCEDURE — 84439 ASSAY OF FREE THYROXINE: CPT

## 2018-07-19 PROCEDURE — 36415 COLL VENOUS BLD VENIPUNCTURE: CPT | Mod: PO

## 2018-07-19 NOTE — PATIENT INSTRUCTIONS
"  Exercises to Prevent Falls  Certain types of exercises may help make you less likely to fall. Try the ones below. Or do other exercises that your health care provider suggests. Depending on your health, you may need to start slowly. Don't let that stop you. Even small amounts of exercise can help you. Be sure to talk to your health care provider before starting any exercise program.       Improve balance  Many types of exercise can help improve balance. Rg chi and yoga are good examples. Here's another one to try. You can do it anytime and almost anywhere.  · Stand next to a counter or solid support.  · Push yourself up onto your tiptoes.  · Hold for 5 seconds. If you start to lose your balance, hold on to the counter.  · Rest and repeat 5 times. Work up to holding for 20 to 30 seconds, if you can. Increase flexibility  Being more flexible makes it easier for you to move around safely. Try exercises like the seated hamstring stretch.  · Sit in a chair and put one foot on a stool.  · Straighten your leg and reach with both hands down either side of your leg. Reach as far down your leg as you can.  · Hold for about 20 seconds.  · Go back to the starting position. Then repeat 5 times. Switch legs. Build strength  "Resistance" exercises help build strength. You can do them without equipment. Or you can use weights, elastic bands, or special machines. One such exercise is called the biceps curl. You can hold a 1-pound weight or even a can of soup. Do this exercise at least 3 times a week. Strive for every day.  · Sit up straight in a chair.  · Keep your elbow close to your body and your wrist straight.  · Bend your arm, moving your hand up to your shoulder. Then slowly lower your arm.  · Repeat 5 times. Switch to the other arm.   Build your staying power  Aerobic exercises make your heart and lungs stronger so you can keep moving longer. Walking and swimming are two of the best types of exercises you can do. Using a " stationary bike is great, too. Find an aerobic exercise that you enjoy. Start slowly and build up. Even 5 minutes is helpful. Aim for a goal of 30 minutes, at least 3 times a week. You don't have to do 30 minutes in 1 session. Break it up and walk a little throughout the day.  More helpful tips  · Start easy. Slowly work up to doing more.  · Talk with your health care provider about the best exercises for you.  · Call senior centers or health clubs about exercise programs.  · If needed, have a family member watch you walk every so often to check your stability.  · Exercise with a friend. Choose an activity you both enjoy.  · Consider lon chi or yoga to strengthen your balance.  · Try exercises that you can do anytime, anywhere. Here are 2 examples. Have someone with you when you first try these:  ¨ Practice walking by placing 1 foot right in front of the other.  ¨ Stand up and sit down 10 times. Repeat this throughout the day.   Date Last Reviewed: 6/13/2015  © 3026-4926 [employer]. [address] [place] [address]. All rights reserved. This information is not intended as a substitute for professional medical care. Always follow your healthcare professional's instructions.          Counseling and Referral of Other Preventative  (Italic type indicates deductible and co-insurance are waived)    Patient Name: Leonid Delacruz  Today's Date: 7/19/2018    Health Maintenance       Date Due Completion Date    Pneumococcal (65+) (2 of 2 - PPSV23) 02/03/2018 2/3/2017    Influenza Vaccine 08/01/2018 4/27/2017 (Declined)    Override on 4/27/2017: Declined    Lipid Panel 06/14/2019 6/14/2018    TETANUS VACCINE 04/20/2025 4/20/2015        No orders of the defined types were placed in this encounter.    The following information is provided to all patients.  This information is to help you find resources for any of the problems found today that may be affecting your health:                Living healthy  guide: www.[facility].[place].[facility]      Understanding Diabetes: www.diabetes.org      Eating healthy: www.cdc.gov/healthyweight      CDC home safety checklist: www.cdc.gov/steadi/patient.html      Agency on Aging: www.goea.[place].[facility]      Alcoholics anonymous (AA): www.aa.org      Physical Activity: www.[place].nih.gov/wi7gywv      Tobacco use: www.quitwithusla.org

## 2018-07-19 NOTE — PROGRESS NOTES
Leonid Delacruz presented for a  Medicare AWV and comprehensive Health Risk Assessment today. The following components were reviewed and updated:    · Medical history  · Family History  · Social history  · Allergies and Current Medications  · Health Risk Assessment  · Health Maintenance  · Care Team     ** See Completed Assessments for Annual Wellness Visit within the encounter summary.**       The following assessments were completed:  · Living Situation  · CAGE  · Depression Screening  · Timed Get Up and Go  · Whisper Test  · Cognitive Function Screening  · Nutrition Screening  · ADL Screening  · PAQ Screening            Vitals:    07/19/18 0805   BP: 112/62   BP Location: Right arm   Patient Position: Sitting   Pulse: 84   Weight: 83 kg (182 lb 15.7 oz)   Height: 6' (1.829 m)     Body mass index is 24.82 kg/m².     Physical Exam   Constitutional: He is oriented to person, place, and time. He appears well-developed and well-nourished. No distress.   HENT:   Head: Normocephalic and atraumatic.   Cardiovascular: Normal rate and regular rhythm.    Pulmonary/Chest: Effort normal. No respiratory distress. He has wheezes (Intermittent expiratory). He has no rales.   Musculoskeletal: He exhibits no edema, tenderness or deformity.   Ambulates with cane   Neurological: He is alert and oriented to person, place, and time.   Skin: Skin is warm and dry. He is not diaphoretic.   Psychiatric: He has a normal mood and affect. His behavior is normal. He expresses no homicidal and no suicidal ideation. He expresses no suicidal plans and no homicidal plans.   Vitals reviewed.        Diagnoses and health risks identified today and associated recommendations/orders:    1. Encounter for preventive health examination  Health Maintenance reviewed.  Pneumococcal 23 due-patient thinks he already received vaccine. He will check with PCP, will defer for now.  Fall prevention exercises provided.    2. Thrombocytosis  Stable.   Followed by  PCP.     3. Atherosclerosis of aorta  Stable.   Followed by Cardiology.     4. Chronic diastolic heart failure  Stable.   Followed by Cardiology.     5. Pulmonary emphysema, unspecified emphysema type  Stable.  Continue current medication.   Followed by Pulmonology.     6. Senile purpura  Stable.   Followed by PCP.     7. Pulmonary hypertension  Stable.   Followed by Pulmonology and Cardiology.     8. Lumbar arthropathy  Stable.   Continue current medication.  Followed by Pain Medicine.     9. Fracture of vertebra due to osteoporosis, sequela  Stable.   Continue current medication.  Followed by Pain Management and Neurosurgery.     10. Compression fracture of vertebra, sequela  Stable.   Followed by Neurosurgery.     11. Mild aortic sclerosis  Stable.   Followed by PCP.     12. MDD (major depressive disorder), recurrent episode, moderate  Stable.   Denies suicidal and homicidal ideations. Denies having a plan.  Continue current medication.  Followed by PCP.     13. HTN (hypertension), benign  Stable.   Continue current medication.  Followed by PCP.     14. Mild mitral and aortic regurgitation  Stable.   Followed by Cardiology.     15. Dyslipidemia  Stable.   Followed by PCP.     16. CKD (chronic kidney disease) stage 3, GFR 30-59 ml/min  Stable.   Followed by PCP.     17. Erectile dysfunction, unspecified erectile dysfunction type  Stable.   Followed by Urology.     18. Benign non-nodular prostatic hyperplasia without lower urinary tract symptoms  Stable.   Continue current medication.  Followed by Urology.     19. Primary osteoarthritis involving multiple joints  Stable.   Continue current medication.  Followed by Pain Management.       Provided Leonid with a 5-10 year written screening schedule and personal prevention plan. Recommendations were developed using the USPSTF age appropriate recommendations. Education, counseling, and referrals were provided as needed. After Visit Summary printed and given to patient  which includes a list of additional screenings\tests needed.    Follow-up for PCP visit. Return in 1 year for AWV.    Melvina Gallardo NP

## 2018-09-07 RX ORDER — TRAMADOL HYDROCHLORIDE 50 MG/1
TABLET ORAL
Qty: 80 TABLET | Refills: 1 | Status: SHIPPED | OUTPATIENT
Start: 2018-09-07 | End: 2019-01-07

## 2018-09-07 RX ORDER — SERTRALINE HYDROCHLORIDE 100 MG/1
TABLET, FILM COATED ORAL
Qty: 90 TABLET | Refills: 3 | Status: SHIPPED | OUTPATIENT
Start: 2018-09-07 | End: 2019-06-12 | Stop reason: SDUPTHER

## 2018-10-04 ENCOUNTER — DOCUMENTATION ONLY (OUTPATIENT)
Dept: AUDIOLOGY | Facility: CLINIC | Age: 83
End: 2018-10-04

## 2018-10-04 NOTE — PROGRESS NOTES
Maxine Kumar   Purchase date: 4/22/16  Lt SN [id number]  Rt SN [id number]  : Moderate  Warranty Exp 5/21/19

## 2018-10-05 DIAGNOSIS — K44.9 GASTROESOPHAGEAL REFLUX DISEASE WITH HIATAL HERNIA: ICD-10-CM

## 2018-10-05 DIAGNOSIS — K21.9 GASTROESOPHAGEAL REFLUX DISEASE WITH HIATAL HERNIA: ICD-10-CM

## 2018-10-05 RX ORDER — OMEPRAZOLE 40 MG/1
CAPSULE, DELAYED RELEASE ORAL
Qty: 180 CAPSULE | Refills: 1 | Status: SHIPPED | OUTPATIENT
Start: 2018-10-05 | End: 2019-09-23 | Stop reason: SDUPTHER

## 2018-10-12 RX ORDER — AMLODIPINE BESYLATE 5 MG/1
5 TABLET ORAL DAILY
Qty: 30 TABLET | Refills: 3 | Status: SHIPPED | OUTPATIENT
Start: 2018-10-12 | End: 2019-10-12

## 2019-01-07 ENCOUNTER — LAB VISIT (OUTPATIENT)
Dept: LAB | Facility: HOSPITAL | Age: 84
End: 2019-01-07
Attending: INTERNAL MEDICINE
Payer: MEDICARE

## 2019-01-07 ENCOUNTER — OFFICE VISIT (OUTPATIENT)
Dept: INTERNAL MEDICINE | Facility: CLINIC | Age: 84
End: 2019-01-07
Payer: MEDICARE

## 2019-01-07 VITALS
SYSTOLIC BLOOD PRESSURE: 110 MMHG | WEIGHT: 178.13 LBS | RESPIRATION RATE: 18 BRPM | DIASTOLIC BLOOD PRESSURE: 56 MMHG | TEMPERATURE: 98 F | BODY MASS INDEX: 24.13 KG/M2 | HEIGHT: 72 IN | HEART RATE: 93 BPM

## 2019-01-07 DIAGNOSIS — M47.816 LUMBAR ARTHROPATHY: ICD-10-CM

## 2019-01-07 DIAGNOSIS — I50.32 CHRONIC DIASTOLIC HEART FAILURE: ICD-10-CM

## 2019-01-07 DIAGNOSIS — R29.6 FREQUENT FALLS: ICD-10-CM

## 2019-01-07 DIAGNOSIS — N18.30 CKD (CHRONIC KIDNEY DISEASE) STAGE 3, GFR 30-59 ML/MIN: ICD-10-CM

## 2019-01-07 DIAGNOSIS — I10 HTN (HYPERTENSION), BENIGN: ICD-10-CM

## 2019-01-07 DIAGNOSIS — M51.37 DEGENERATION OF LUMBAR OR LUMBOSACRAL INTERVERTEBRAL DISC: ICD-10-CM

## 2019-01-07 DIAGNOSIS — I70.0 ATHEROSCLEROSIS OF AORTA: ICD-10-CM

## 2019-01-07 DIAGNOSIS — I27.20 PULMONARY HYPERTENSION: ICD-10-CM

## 2019-01-07 DIAGNOSIS — F33.1 MDD (MAJOR DEPRESSIVE DISORDER), RECURRENT EPISODE, MODERATE: ICD-10-CM

## 2019-01-07 DIAGNOSIS — J43.9 PULMONARY EMPHYSEMA, UNSPECIFIED EMPHYSEMA TYPE: Primary | Chronic | ICD-10-CM

## 2019-01-07 DIAGNOSIS — H81.10 BENIGN PAROXYSMAL POSITIONAL VERTIGO, UNSPECIFIED LATERALITY: ICD-10-CM

## 2019-01-07 DIAGNOSIS — E03.9 HYPOTHYROIDISM, UNSPECIFIED TYPE: ICD-10-CM

## 2019-01-07 DIAGNOSIS — K44.9 GASTROESOPHAGEAL REFLUX DISEASE WITH HIATAL HERNIA: ICD-10-CM

## 2019-01-07 DIAGNOSIS — D69.2 SENILE PURPURA: ICD-10-CM

## 2019-01-07 DIAGNOSIS — N40.0 BENIGN NON-NODULAR PROSTATIC HYPERPLASIA WITHOUT LOWER URINARY TRACT SYMPTOMS: ICD-10-CM

## 2019-01-07 DIAGNOSIS — K21.9 GASTROESOPHAGEAL REFLUX DISEASE WITH HIATAL HERNIA: ICD-10-CM

## 2019-01-07 DIAGNOSIS — J43.9 PULMONARY EMPHYSEMA, UNSPECIFIED EMPHYSEMA TYPE: Chronic | ICD-10-CM

## 2019-01-07 DIAGNOSIS — D75.839 THROMBOCYTOSIS: ICD-10-CM

## 2019-01-07 LAB
ALBUMIN SERPL BCP-MCNC: 3.6 G/DL
ALP SERPL-CCNC: 94 U/L
ALT SERPL W/O P-5'-P-CCNC: 9 U/L
ANION GAP SERPL CALC-SCNC: 9 MMOL/L
AST SERPL-CCNC: 17 U/L
BASOPHILS # BLD AUTO: 0.04 K/UL
BASOPHILS NFR BLD: 0.3 %
BILIRUB SERPL-MCNC: 0.4 MG/DL
BUN SERPL-MCNC: 24 MG/DL
CALCIUM SERPL-MCNC: 9.1 MG/DL
CHLORIDE SERPL-SCNC: 101 MMOL/L
CO2 SERPL-SCNC: 27 MMOL/L
CREAT SERPL-MCNC: 1.9 MG/DL
DIFFERENTIAL METHOD: ABNORMAL
EOSINOPHIL # BLD AUTO: 0.1 K/UL
EOSINOPHIL NFR BLD: 0.7 %
ERYTHROCYTE [DISTWIDTH] IN BLOOD BY AUTOMATED COUNT: 17.9 %
EST. GFR  (AFRICAN AMERICAN): 36.6 ML/MIN/1.73 M^2
EST. GFR  (NON AFRICAN AMERICAN): 31.7 ML/MIN/1.73 M^2
GLUCOSE SERPL-MCNC: 102 MG/DL
HCT VFR BLD AUTO: 36.4 %
HGB BLD-MCNC: 11.3 G/DL
IMM GRANULOCYTES # BLD AUTO: 0.03 K/UL
IMM GRANULOCYTES NFR BLD AUTO: 0.3 %
LYMPHOCYTES # BLD AUTO: 2.1 K/UL
LYMPHOCYTES NFR BLD: 18.4 %
MCH RBC QN AUTO: 26.5 PG
MCHC RBC AUTO-ENTMCNC: 31 G/DL
MCV RBC AUTO: 85 FL
MONOCYTES # BLD AUTO: 1.1 K/UL
MONOCYTES NFR BLD: 9.7 %
NEUTROPHILS # BLD AUTO: 8.2 K/UL
NEUTROPHILS NFR BLD: 70.6 %
NRBC BLD-RTO: 0 /100 WBC
PLATELET # BLD AUTO: 368 K/UL
PMV BLD AUTO: 9.5 FL
POTASSIUM SERPL-SCNC: 4.8 MMOL/L
PROT SERPL-MCNC: 7.5 G/DL
RBC # BLD AUTO: 4.26 M/UL
SODIUM SERPL-SCNC: 137 MMOL/L
WBC # BLD AUTO: 11.56 K/UL

## 2019-01-07 PROCEDURE — 99999 PR PBB SHADOW E&M-EST. PATIENT-LVL IV: CPT | Mod: PBBFAC,HCNC,, | Performed by: INTERNAL MEDICINE

## 2019-01-07 PROCEDURE — 36415 COLL VENOUS BLD VENIPUNCTURE: CPT | Mod: HCNC,PO

## 2019-01-07 PROCEDURE — 80053 COMPREHEN METABOLIC PANEL: CPT | Mod: HCNC

## 2019-01-07 PROCEDURE — 99499 RISK ADDL DX/OHS AUDIT: ICD-10-PCS | Mod: S$GLB,,, | Performed by: INTERNAL MEDICINE

## 2019-01-07 PROCEDURE — 3078F PR MOST RECENT DIASTOLIC BLOOD PRESSURE < 80 MM HG: ICD-10-PCS | Mod: CPTII,HCNC,S$GLB, | Performed by: INTERNAL MEDICINE

## 2019-01-07 PROCEDURE — 1101F PR PT FALLS ASSESS DOC 0-1 FALLS W/OUT INJ PAST YR: ICD-10-PCS | Mod: CPTII,HCNC,S$GLB, | Performed by: INTERNAL MEDICINE

## 2019-01-07 PROCEDURE — 99999 PR PBB SHADOW E&M-EST. PATIENT-LVL IV: ICD-10-PCS | Mod: PBBFAC,HCNC,, | Performed by: INTERNAL MEDICINE

## 2019-01-07 PROCEDURE — 99499 UNLISTED E&M SERVICE: CPT | Mod: S$GLB,,, | Performed by: INTERNAL MEDICINE

## 2019-01-07 PROCEDURE — 99214 OFFICE O/P EST MOD 30 MIN: CPT | Mod: HCNC,S$GLB,, | Performed by: INTERNAL MEDICINE

## 2019-01-07 PROCEDURE — 1101F PT FALLS ASSESS-DOCD LE1/YR: CPT | Mod: CPTII,HCNC,S$GLB, | Performed by: INTERNAL MEDICINE

## 2019-01-07 PROCEDURE — 3078F DIAST BP <80 MM HG: CPT | Mod: CPTII,HCNC,S$GLB, | Performed by: INTERNAL MEDICINE

## 2019-01-07 PROCEDURE — 99214 PR OFFICE/OUTPT VISIT, EST, LEVL IV, 30-39 MIN: ICD-10-PCS | Mod: HCNC,S$GLB,, | Performed by: INTERNAL MEDICINE

## 2019-01-07 PROCEDURE — 3074F PR MOST RECENT SYSTOLIC BLOOD PRESSURE < 130 MM HG: ICD-10-PCS | Mod: CPTII,HCNC,S$GLB, | Performed by: INTERNAL MEDICINE

## 2019-01-07 PROCEDURE — 3074F SYST BP LT 130 MM HG: CPT | Mod: CPTII,HCNC,S$GLB, | Performed by: INTERNAL MEDICINE

## 2019-01-07 PROCEDURE — 85025 COMPLETE CBC W/AUTO DIFF WBC: CPT | Mod: HCNC

## 2019-01-07 RX ORDER — HYDROCODONE BITARTRATE AND ACETAMINOPHEN 7.5; 325 MG/1; MG/1
1 TABLET ORAL EVERY 6 HOURS PRN
Qty: 60 TABLET | Refills: 0 | Status: SHIPPED | OUTPATIENT
Start: 2019-01-07 | End: 2020-09-22

## 2019-01-07 NOTE — PROGRESS NOTES
Subjective:       Patient ID: Leonid Delacruz Jr. is a 84 y.o. male.    Chief Complaint: Follow-up (6 month) and Back Pain    HPI   83 y.o. Male with COPD/Emphysema, chronic diastolic heart failure, CKD III, MDD, Aortic atherosclerosis, senile purpura, vertigo, malfunction of penile prosthesis, Lumbar arthropathy with L1 compression fracture, aortic sclerosis, BPH, GERD, hypothyroidism, thrombocytosis here for 6 month f/u. Pt's back brandy has continued to worsen overtime. No help with EGUENIO's.   Review of Systems   Constitutional: Negative for activity change, appetite change, chills, diaphoresis, fatigue, fever and unexpected weight change.   HENT: Negative for congestion, mouth sores, postnasal drip, rhinorrhea, sinus pressure, sneezing, sore throat, trouble swallowing and voice change.    Eyes: Negative for discharge, itching and visual disturbance.   Respiratory: Negative for cough, chest tightness, shortness of breath and wheezing.    Cardiovascular: Negative for chest pain, palpitations and leg swelling.   Gastrointestinal: Negative for abdominal pain, blood in stool, constipation, diarrhea, nausea and vomiting.   Endocrine: Negative for cold intolerance and heat intolerance.   Genitourinary: Negative for difficulty urinating, dysuria, flank pain, hematuria and urgency.   Musculoskeletal: Positive for arthralgias and back pain. Negative for myalgias and neck pain.   Skin: Negative for rash and wound.   Allergic/Immunologic: Negative for environmental allergies and food allergies.   Neurological: Negative for dizziness, tremors, seizures, syncope, weakness and headaches.   Hematological: Negative for adenopathy. Does not bruise/bleed easily.   Psychiatric/Behavioral: Negative for confusion, sleep disturbance and suicidal ideas. The patient is not nervous/anxious.        Objective:      Physical Exam   Constitutional: He is oriented to person, place, and time. He appears well-developed and well-nourished. No  distress.   HENT:   Head: Normocephalic and atraumatic.   Right Ear: External ear normal.   Left Ear: External ear normal.   Nose: Nose normal.   Mouth/Throat: Oropharynx is clear and moist. No oropharyngeal exudate.   Eyes: Conjunctivae and EOM are normal. Pupils are equal, round, and reactive to light. Right eye exhibits no discharge. Left eye exhibits no discharge. No scleral icterus.   Neck: Normal range of motion. Neck supple. No JVD present.   Cardiovascular: Normal rate, regular rhythm and normal heart sounds.   No murmur heard.  Pulmonary/Chest: Effort normal and breath sounds normal. No respiratory distress. He has no wheezes. He has no rales.   Musculoskeletal: He exhibits no edema.        Lumbar back: He exhibits tenderness and pain.   Lymphadenopathy:     He has no cervical adenopathy.   Neurological: He is alert and oriented to person, place, and time.   Skin: Skin is warm and dry. No rash noted. He is not diaphoretic. No pallor.       Assessment:       1. Pulmonary emphysema, unspecified emphysema type    2. Chronic diastolic heart failure    3. CKD (chronic kidney disease) stage 3, GFR 30-59 ml/min    4. MDD (major depressive disorder), recurrent episode, moderate    5. Benign paroxysmal positional vertigo, unspecified laterality    6. Atherosclerosis of aorta    7. Senile purpura    8. Degeneration of lumbar or lumbosacral intervertebral disc    9. Lumbar arthropathy    10. HTN (hypertension), benign    11. Mild aortic sclerosis    12. Benign non-nodular prostatic hyperplasia without lower urinary tract symptoms    13. Gastroesophageal reflux disease with hiatal hernia    14. Frequent falls    15. Thrombocytosis    16. Pulmonary hypertension    17. Hypothyroidism, unspecified type        Plan:    1. COPD- fair control on Symbicort BID   2. Chronic diastolic heart failure with mild pulm HTN- stable, no S/S of heart failure   3. CKD III- stable, no NSAIDs   4. MDD- stable on Zoloft 100 mg daily   5.  BPPV- fair control on Meclizine   6. Aortic atherosclerosis- stable on ASA   7. Senile purpura- stable, continue to monitor    8. Lumbar arthropathy/ L1 compression fracture/Sacral fracture- fair control on Tramadol PRN        Followed by Pain Management/Neurosurgery         Referral to PM&R, Rx [place] PRN   9. HTN- stable, CPT  10. Aortic sclerosis- stable, continue to monitor   11. BPH- stable on Flomax  12. GERD- stable on Prilosec  13. Frequent falls- none recently, pt did not f/u with Neurology   14. Thrombocytosis- will follow   15. Hypothyroidism- Continue Synthroid 75 mcg daily  16. Pulm HTN- stable, will follow   17. F/u in 6 months for annual exam

## 2019-01-08 RX ORDER — BUDESONIDE AND FORMOTEROL FUMARATE DIHYDRATE 160; 4.5 UG/1; UG/1
AEROSOL RESPIRATORY (INHALATION)
Qty: 10.2 G | Refills: 0 | Status: SHIPPED | OUTPATIENT
Start: 2019-01-08 | End: 2019-04-15 | Stop reason: SDUPTHER

## 2019-02-01 DIAGNOSIS — N18.9 CHRONIC KIDNEY DISEASE, UNSPECIFIED CKD STAGE: ICD-10-CM

## 2019-02-08 DIAGNOSIS — I48.0 PAROXYSMAL ATRIAL FIBRILLATION: ICD-10-CM

## 2019-02-08 DIAGNOSIS — I27.20 PULMONARY HYPERTENSION: Primary | ICD-10-CM

## 2019-02-19 ENCOUNTER — CLINICAL SUPPORT (OUTPATIENT)
Dept: CARDIOLOGY | Facility: CLINIC | Age: 84
End: 2019-02-19
Attending: INTERNAL MEDICINE
Payer: MEDICARE

## 2019-02-19 VITALS
SYSTOLIC BLOOD PRESSURE: 152 MMHG | BODY MASS INDEX: 24.11 KG/M2 | HEART RATE: 78 BPM | WEIGHT: 178 LBS | DIASTOLIC BLOOD PRESSURE: 80 MMHG | HEIGHT: 72 IN

## 2019-02-19 DIAGNOSIS — I48.0 PAROXYSMAL ATRIAL FIBRILLATION: ICD-10-CM

## 2019-02-19 DIAGNOSIS — I27.20 PULMONARY HYPERTENSION: ICD-10-CM

## 2019-02-19 LAB
ASCENDING AORTA: 3.9 CM
AV INDEX (PROSTH): 0.81
AV MEAN GRADIENT: 9.67 MMHG
AV PEAK GRADIENT: 16.48 MMHG
AV REGURGITATION PRESSURE HALF TIME: 370 MS
AV VALVE AREA: 3.36 CM2
AV VELOCITY RATIO: 0.74
BSA FOR ECHO PROCEDURE: 2.03 M2
CV ECHO LV RWT: 0.29 CM
DOP CALC AO PEAK VEL: 2.03 M/S
DOP CALC AO VTI: 43.37 CM
DOP CALC LVOT AREA: 4.15 CM2
DOP CALC LVOT DIAMETER: 2.3 CM
DOP CALC LVOT PEAK VEL: 1.5 M/S
DOP CALC LVOT STROKE VOLUME: 145.72 CM3
DOP CALCLVOT PEAK VEL VTI: 35.09 CM
E WAVE DECELERATION TIME: 182.42 MSEC
E/A RATIO: 0.63
E/E' RATIO: 8.67
ECHO LV POSTERIOR WALL: 0.8 CM (ref 0.6–1.1)
FRACTIONAL SHORTENING: 33 % (ref 28–44)
INTERVENTRICULAR SEPTUM: 0.89 CM (ref 0.6–1.1)
IVRT: 0.11 MSEC
LA MAJOR: 5.25 CM
LA MINOR: 5.22 CM
LA WIDTH: 4.47 CM
LEFT ATRIUM SIZE: 4.21 CM
LEFT ATRIUM VOLUME INDEX: 41.3 ML/M2
LEFT ATRIUM VOLUME: 83.74 CM3
LEFT INTERNAL DIMENSION IN SYSTOLE: 3.67 CM (ref 2.1–4)
LEFT VENTRICLE DIASTOLIC VOLUME INDEX: 72.87 ML/M2
LEFT VENTRICLE DIASTOLIC VOLUME: 147.74 ML
LEFT VENTRICLE MASS INDEX: 84.8 G/M2
LEFT VENTRICLE SYSTOLIC VOLUME INDEX: 28.1 ML/M2
LEFT VENTRICLE SYSTOLIC VOLUME: 57.03 ML
LEFT VENTRICULAR INTERNAL DIMENSION IN DIASTOLE: 5.51 CM (ref 3.5–6)
LEFT VENTRICULAR MASS: 171.96 G
LV LATERAL E/E' RATIO: 8.13
LV SEPTAL E/E' RATIO: 9.29
MV PEAK A VEL: 1.03 M/S
MV PEAK E VEL: 0.65 M/S
PISA TR MAX VEL: 2.71 M/S
PULM VEIN S/D RATIO: 1.32
PV PEAK D VEL: 0.41 M/S
PV PEAK S VEL: 0.54 M/S
RA MAJOR: 4.92 CM
RA PRESSURE: 3 MMHG
RA WIDTH: 2.84 CM
RIGHT VENTRICULAR END-DIASTOLIC DIMENSION: 3.46 CM
SINUS: 4 CM
STJ: 3.34 CM
TDI LATERAL: 0.08
TDI SEPTAL: 0.07
TDI: 0.08
TR MAX PG: 29.38 MMHG
TRICUSPID ANNULAR PLANE SYSTOLIC EXCURSION: 1.88 CM
TV REST PULMONARY ARTERY PRESSURE: 32 MMHG

## 2019-02-19 PROCEDURE — 93306 TRANSTHORACIC ECHO (TTE) COMPLETE (CUPID ONLY): ICD-10-PCS | Mod: HCNC,S$GLB,, | Performed by: INTERNAL MEDICINE

## 2019-02-19 PROCEDURE — 93306 TTE W/DOPPLER COMPLETE: CPT | Mod: HCNC,S$GLB,, | Performed by: INTERNAL MEDICINE

## 2019-02-19 PROCEDURE — 99999 PR PBB SHADOW E&M-EST. PATIENT-LVL II: ICD-10-PCS | Mod: PBBFAC,HCNC,,

## 2019-02-19 PROCEDURE — 99999 PR PBB SHADOW E&M-EST. PATIENT-LVL II: CPT | Mod: PBBFAC,HCNC,,

## 2019-02-21 ENCOUNTER — OFFICE VISIT (OUTPATIENT)
Dept: CARDIOLOGY | Facility: CLINIC | Age: 84
End: 2019-02-21
Payer: MEDICARE

## 2019-02-21 VITALS
WEIGHT: 179.69 LBS | DIASTOLIC BLOOD PRESSURE: 60 MMHG | HEART RATE: 74 BPM | BODY MASS INDEX: 24.34 KG/M2 | OXYGEN SATURATION: 94 % | HEIGHT: 72 IN | SYSTOLIC BLOOD PRESSURE: 98 MMHG

## 2019-02-21 DIAGNOSIS — J43.2 CENTRILOBULAR EMPHYSEMA: ICD-10-CM

## 2019-02-21 DIAGNOSIS — I27.23 PULMONARY HYPERTENSION DUE TO COPD: ICD-10-CM

## 2019-02-21 DIAGNOSIS — R69 DIAGNOSIS DEFERRED: ICD-10-CM

## 2019-02-21 DIAGNOSIS — I51.89 DIASTOLIC DYSFUNCTION WITHOUT HEART FAILURE: Primary | ICD-10-CM

## 2019-02-21 DIAGNOSIS — J44.9 PULMONARY HYPERTENSION DUE TO COPD: ICD-10-CM

## 2019-02-21 DIAGNOSIS — I10 ESSENTIAL HYPERTENSION: ICD-10-CM

## 2019-02-21 DIAGNOSIS — N18.30 CKD (CHRONIC KIDNEY DISEASE) STAGE 3, GFR 30-59 ML/MIN: ICD-10-CM

## 2019-02-21 PROCEDURE — 99214 PR OFFICE/OUTPT VISIT, EST, LEVL IV, 30-39 MIN: ICD-10-PCS | Mod: HCNC,S$GLB,, | Performed by: INTERNAL MEDICINE

## 2019-02-21 PROCEDURE — 99999 PR PBB SHADOW E&M-EST. PATIENT-LVL III: ICD-10-PCS | Mod: PBBFAC,HCNC,, | Performed by: INTERNAL MEDICINE

## 2019-02-21 PROCEDURE — 3074F PR MOST RECENT SYSTOLIC BLOOD PRESSURE < 130 MM HG: ICD-10-PCS | Mod: HCNC,CPTII,S$GLB, | Performed by: INTERNAL MEDICINE

## 2019-02-21 PROCEDURE — 3078F DIAST BP <80 MM HG: CPT | Mod: HCNC,CPTII,S$GLB, | Performed by: INTERNAL MEDICINE

## 2019-02-21 PROCEDURE — 99499 RISK ADDL DX/OHS AUDIT: ICD-10-PCS | Mod: S$GLB,,, | Performed by: INTERNAL MEDICINE

## 2019-02-21 PROCEDURE — 1101F PR PT FALLS ASSESS DOC 0-1 FALLS W/OUT INJ PAST YR: ICD-10-PCS | Mod: HCNC,CPTII,S$GLB, | Performed by: INTERNAL MEDICINE

## 2019-02-21 PROCEDURE — 3074F SYST BP LT 130 MM HG: CPT | Mod: HCNC,CPTII,S$GLB, | Performed by: INTERNAL MEDICINE

## 2019-02-21 PROCEDURE — 1101F PT FALLS ASSESS-DOCD LE1/YR: CPT | Mod: HCNC,CPTII,S$GLB, | Performed by: INTERNAL MEDICINE

## 2019-02-21 PROCEDURE — 93000 EKG 12-LEAD: ICD-10-PCS | Mod: HCNC,S$GLB,, | Performed by: INTERNAL MEDICINE

## 2019-02-21 PROCEDURE — 99214 OFFICE O/P EST MOD 30 MIN: CPT | Mod: HCNC,S$GLB,, | Performed by: INTERNAL MEDICINE

## 2019-02-21 PROCEDURE — 93000 ELECTROCARDIOGRAM COMPLETE: CPT | Mod: HCNC,S$GLB,, | Performed by: INTERNAL MEDICINE

## 2019-02-21 PROCEDURE — 99499 UNLISTED E&M SERVICE: CPT | Mod: S$GLB,,, | Performed by: INTERNAL MEDICINE

## 2019-02-21 PROCEDURE — 3078F PR MOST RECENT DIASTOLIC BLOOD PRESSURE < 80 MM HG: ICD-10-PCS | Mod: HCNC,CPTII,S$GLB, | Performed by: INTERNAL MEDICINE

## 2019-02-21 PROCEDURE — 99999 PR PBB SHADOW E&M-EST. PATIENT-LVL III: CPT | Mod: PBBFAC,HCNC,, | Performed by: INTERNAL MEDICINE

## 2019-02-21 NOTE — PROGRESS NOTES
Subjective:   Patient ID:  Leonid Delacruz Jr. is a 84 y.o. male who presents for follow-up of Dizziness      Problem List:  COPD - emphysema  - nonsmoker  PUD with bleeding , 5/10,    Dizziness and falls  Back pain  Afib  (during an acute illness)    HPI:   Leonid Delacruz Jr. has emphysema but is a nonsmoker.  He has stable exertional shortness of breath.  However he is more likely to stop up walking due to back pain.  Echo : LV 5.5 cm, mild left atrial enlargement, estimated PA systolic 32 mmHg, ascending aorta and aortic root are mildly enlarged measuring 3.9 and 4.0 cm.  Grade 2 diastolic dysfunction was reported.  He does not report orthopnea, PND or edema.  He has not required a diuretic.  He has not been hospitalized with congestive heart failure.  Creatinine is elevated at 1.7 but has been stable since .  Prior to that his creatinine is 1.3-1.4.  He takes Celebrex for back pain.  He took care of his wife who had Alzheimer's for 7 years. She  3 years ago.     In 12/15, I made the following notes: He had a HUT in  and VNG in . Dr. 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 In addition he felt that there was unsteadiness due to a gait disturbance. Dr. Lyndsay Lomeli from EP felt that he had venous pooling. He had atrial fib with a rapid ventricular rate while hospitalized with a GI bleed in . An echo in  revealed normal LV systolic function but with mildly elevated pulmonary hypertension. PAS 44 mm Hg. A cor angio in  revealed luminal irregularities.       Review of Systems   Constitution: Positive for malaise/fatigue. Negative for weakness, weight gain and weight loss.   HENT: Negative for hearing loss and nosebleeds.    Eyes: Negative for visual disturbance.   Cardiovascular: Positive for dyspnea on exertion. Negative for chest pain,  claudication, irregular heartbeat, leg swelling, orthopnea, palpitations, paroxysmal nocturnal dyspnea and syncope.   Respiratory: Positive for cough, sputum production and wheezing. Negative for hemoptysis.    Hematologic/Lymphatic: Does not bruise/bleed easily.   Musculoskeletal: Positive for arthritis, back pain and falls. Negative for joint pain, muscle cramps, muscle weakness and myalgias.   Gastrointestinal: Negative for abdominal pain, heartburn and melena.   Genitourinary: Negative for frequency, hematuria and nocturia.   Neurological: Positive for dizziness, light-headedness and loss of balance. Negative for headaches, numbness and paresthesias.   Psychiatric/Behavioral: Negative for depression. The patient is not nervous/anxious.        Current Outpatient Medications   Medication Sig    albuterol (PROAIR HFA) 90 mcg/actuation inhaler 2 HFA Aerosol Inhaler Inhalation Four times a day p;rn    alendronate (FOSAMAX) 10 MG Tab Take 10 mg by mouth once daily.     amLODIPine (NORVASC) 5 MG tablet Take 1 tablet (5 mg total) by mouth once daily.    aspirin 81 MG Chew Take 81 mg by mouth once daily.    calcitonin, salmon, (FORTICAL) 200 unit/actuation nasal spray 1 spray by Nasal route once daily.    calcium carbonate (OS-JOANA) 500 mg calcium (1,250 mg) tablet Take 1 tablet (500 mg total) by mouth 2 (two) times daily.    celecoxib (CELEBREX) 200 MG capsule TAKE 1 CAPSULE(200 MG) BY MOUTH TWICE DAILY    cholecalciferol, vitamin D3, 400 unit Cap Take 1 capsule (400 Units total) by mouth 2 (two) times daily.    GEL-LUCIE 0.4 % Gel BRUSH ON NIGHTLY.   SPIT OUT EXCESS AND DO NOT RINSE AFTERWARD    HYDROcodone-acetaminophen (NORCO) 7.5-325 mg per tablet Take 1 tablet by mouth every 6 (six) hours as needed for Pain.    levothyroxine (SYNTHROID) 75 MCG tablet Take 1 tablet (75 mcg total) by mouth once daily.    omeprazole (PRILOSEC) 40 MG capsule TAKE 1 CAPSULE BY MOUTH TWICE DAILY BEFORE MEALS    ranitidine  (ZANTAC) 150 MG tablet Take 150 mg by mouth 2 (two) times daily.    sertraline (ZOLOFT) 100 MG tablet TAKE ONE TABLET BY MOUTH ONCE DAILY.    SYMBICORT 160-4.5 mcg/actuation HFAA INHALE 2 PUFFS BY MOUTH TWICE DAILY    tamsulosin (FLOMAX) 0.4 mg Cap TAKE 1 CAPSULE BY MOUTH DAILY         Social History     Tobacco Use    Smoking status: Never Smoker    Smokeless tobacco: Never Used   Substance Use Topics    Alcohol use: Yes     Alcohol/week: 4.2 oz     Types: 7 Cans of beer per week     Comment: occasionally    Drug use: No         Objective:     Physical Exam   Constitutional: He is oriented to person, place, and time. He appears well-developed and well-nourished.   BP 98/60   Pulse 74   Ht 6' (1.829 m)   Wt 81.5 kg (179 lb 10.8 oz)   SpO2 (!) 94%   BMI 24.37 kg/m²      HENT:   Head: Normocephalic and atraumatic.   Neck: No JVD present. Carotid bruit is not present.   Cardiovascular: Normal rate, regular rhythm, S1 normal and S2 normal. Exam reveals no gallop.   No murmur heard.  Pulses:       Radial pulses are 2+ on the right side, and 2+ on the left side.        Posterior tibial pulses are 2+ on the right side, and 2+ on the left side.   Pulmonary/Chest: Effort normal. He has wheezes. He has no rales. Chest wall is not dull to percussion.   Abdominal: Soft. There is no splenomegaly or hepatomegaly. There is no tenderness.   Musculoskeletal:        Right lower leg: He exhibits no edema.        Left lower leg: He exhibits no edema.   Neurological: He is alert and oriented to person, place, and time. Gait normal.   Skin: Skin is warm and dry. No bruising noted. No cyanosis. Nails show no clubbing.   Psychiatric: He has a normal mood and affect. His speech is normal and behavior is normal. Judgment and thought content normal. Cognition and memory are normal.           Lab Results   Component Value Date    CHOL 175 06/14/2018    HDL 71 06/14/2018    LDLCALC 91.2 06/14/2018    TRIG 64 06/14/2018    CHOLHDL  40.6 06/14/2018     Lab Results   Component Value Date     01/07/2019    CREATININE 1.9 (H) 01/07/2019    BUN 24 (H) 01/07/2019     01/07/2019    K 4.8 01/07/2019     01/07/2019    CO2 27 01/07/2019     Lab Results   Component Value Date    ALT 9 (L) 01/07/2019    AST 17 01/07/2019     (H) 11/18/2007    ALKPHOS 94 01/07/2019    BILITOT 0.4 01/07/2019       ECG today sinus rhythm with rSr' in V1.      Assessment and Plan:     1. Diastolic dysfunction without heart failure    2. Centrilobular emphysema    3. Pulmonary hypertension due to COPD    4. Essential hypertension    5. CKD (chronic kidney disease) stage 3, GFR 30-59 ml/min        He has diastolic dysfunction without heart failure.  Extra fluids OK.  Low-sodium diet.  FUP w PCP.     No Follow-up on file.

## 2019-02-21 NOTE — PATIENT INSTRUCTIONS
The pressure inside the left side of your heart is slightly elevated. You have diastolic dysfunction, but you do not have any signs of diastolic congestive heart failure.  You should stay away from salt as that can precipitate heart failure.  On the right side, the pressure inside heart is slightly high but not any worse than in the past.  This is due to COPD.

## 2019-04-01 ENCOUNTER — OFFICE VISIT (OUTPATIENT)
Dept: OPTOMETRY | Facility: CLINIC | Age: 84
End: 2019-04-01
Payer: COMMERCIAL

## 2019-04-01 DIAGNOSIS — Z96.1 PSEUDOPHAKIA OF BOTH EYES: ICD-10-CM

## 2019-04-01 DIAGNOSIS — H35.3131 EARLY DRY STAGE NONEXUDATIVE AGE-RELATED MACULAR DEGENERATION OF BOTH EYES: Primary | ICD-10-CM

## 2019-04-01 PROCEDURE — 99999 PR PBB SHADOW E&M-EST. PATIENT-LVL II: CPT | Mod: PBBFAC,,, | Performed by: OPTOMETRIST

## 2019-04-01 PROCEDURE — 92014 PR EYE EXAM, EST PATIENT,COMPREHESV: ICD-10-PCS | Mod: S$GLB,,, | Performed by: OPTOMETRIST

## 2019-04-01 PROCEDURE — 99999 PR PBB SHADOW E&M-EST. PATIENT-LVL II: ICD-10-PCS | Mod: PBBFAC,,, | Performed by: OPTOMETRIST

## 2019-04-01 PROCEDURE — 92014 COMPRE OPH EXAM EST PT 1/>: CPT | Mod: S$GLB,,, | Performed by: OPTOMETRIST

## 2019-04-01 NOTE — PROGRESS NOTES
SIM WHITE 03/2018  Glasses more than 5 yrs. Old, mainly wears for reading.     Patient sometimes wears OTC +3.25 for near.  Patient hasn't noticed any   vision changes.   Patient defers refraction today.   Not using any drops.       Last edited by Cyndie Quach on 4/1/2019  8:10 AM. (History)            Assessment /Plan     For exam results, see Encounter Report.    Early dry stage nonexudative age-related macular degeneration of both eyes    Pseudophakia of both eyes      1. Mild, Dry, Monitor condition. Recommended eye vitamins. Patient to report any changes. RTC 1 year recheck.  2. Monitor condition. Patient to report any changes. RTC 1 year recheck.

## 2019-04-10 PROBLEM — D64.9 ANEMIA: Status: ACTIVE | Noted: 2019-04-10

## 2019-04-11 ENCOUNTER — TELEPHONE (OUTPATIENT)
Dept: INTERNAL MEDICINE | Facility: CLINIC | Age: 84
End: 2019-04-11

## 2019-04-11 ENCOUNTER — OFFICE VISIT (OUTPATIENT)
Dept: INTERNAL MEDICINE | Facility: CLINIC | Age: 84
End: 2019-04-11
Payer: MEDICARE

## 2019-04-11 VITALS
HEART RATE: 94 BPM | RESPIRATION RATE: 15 BRPM | HEIGHT: 70 IN | OXYGEN SATURATION: 96 % | SYSTOLIC BLOOD PRESSURE: 138 MMHG | DIASTOLIC BLOOD PRESSURE: 70 MMHG | WEIGHT: 176 LBS | BODY MASS INDEX: 25.2 KG/M2

## 2019-04-11 DIAGNOSIS — E78.5 DYSLIPIDEMIA: ICD-10-CM

## 2019-04-11 DIAGNOSIS — J43.9 PULMONARY EMPHYSEMA, UNSPECIFIED EMPHYSEMA TYPE: Chronic | ICD-10-CM

## 2019-04-11 DIAGNOSIS — H35.363 DEGENERATIVE RETINAL DRUSEN OF BOTH EYES: ICD-10-CM

## 2019-04-11 DIAGNOSIS — I70.0 ATHEROSCLEROSIS OF AORTA: ICD-10-CM

## 2019-04-11 DIAGNOSIS — I27.20 PULMONARY HYPERTENSION: ICD-10-CM

## 2019-04-11 DIAGNOSIS — Z12.5 PROSTATE CANCER SCREENING: Primary | ICD-10-CM

## 2019-04-11 DIAGNOSIS — I51.89 DIASTOLIC DYSFUNCTION WITHOUT HEART FAILURE: ICD-10-CM

## 2019-04-11 DIAGNOSIS — D69.2 SENILE PURPURA: ICD-10-CM

## 2019-04-11 DIAGNOSIS — M47.894 OTHER OSTEOARTHRITIS OF SPINE, THORACIC REGION: ICD-10-CM

## 2019-04-11 DIAGNOSIS — M48.50XA VERTEBRAL COMPRESSION FRACTURE: ICD-10-CM

## 2019-04-11 DIAGNOSIS — I10 ESSENTIAL HYPERTENSION: ICD-10-CM

## 2019-04-11 DIAGNOSIS — G89.29 CHRONIC BILATERAL LOW BACK PAIN WITHOUT SCIATICA: ICD-10-CM

## 2019-04-11 DIAGNOSIS — K44.9 GASTROESOPHAGEAL REFLUX DISEASE WITH HIATAL HERNIA: ICD-10-CM

## 2019-04-11 DIAGNOSIS — N40.0 BENIGN NON-NODULAR PROSTATIC HYPERPLASIA WITHOUT LOWER URINARY TRACT SYMPTOMS: ICD-10-CM

## 2019-04-11 DIAGNOSIS — Z00.00 ENCOUNTER FOR PREVENTIVE HEALTH EXAMINATION: Primary | ICD-10-CM

## 2019-04-11 DIAGNOSIS — F33.1 MDD (MAJOR DEPRESSIVE DISORDER), RECURRENT EPISODE, MODERATE: ICD-10-CM

## 2019-04-11 DIAGNOSIS — M47.816 LUMBAR ARTHROPATHY: ICD-10-CM

## 2019-04-11 DIAGNOSIS — M51.37 DEGENERATION OF LUMBAR OR LUMBOSACRAL INTERVERTEBRAL DISC: ICD-10-CM

## 2019-04-11 DIAGNOSIS — M80.08XS FRACTURE OF VERTEBRA DUE TO OSTEOPOROSIS, SEQUELA: ICD-10-CM

## 2019-04-11 DIAGNOSIS — J30.2 SEASONAL ALLERGIC RHINITIS, UNSPECIFIED TRIGGER: ICD-10-CM

## 2019-04-11 DIAGNOSIS — R29.6 FREQUENT FALLS: ICD-10-CM

## 2019-04-11 DIAGNOSIS — D64.9 ANEMIA, UNSPECIFIED TYPE: ICD-10-CM

## 2019-04-11 DIAGNOSIS — M54.50 CHRONIC BILATERAL LOW BACK PAIN WITHOUT SCIATICA: ICD-10-CM

## 2019-04-11 DIAGNOSIS — K21.9 GASTROESOPHAGEAL REFLUX DISEASE WITH HIATAL HERNIA: ICD-10-CM

## 2019-04-11 DIAGNOSIS — N18.30 CKD (CHRONIC KIDNEY DISEASE) STAGE 3, GFR 30-59 ML/MIN: ICD-10-CM

## 2019-04-11 DIAGNOSIS — I08.0 MILD MITRAL AND AORTIC REGURGITATION: ICD-10-CM

## 2019-04-11 DIAGNOSIS — E03.9 HYPOTHYROIDISM, UNSPECIFIED TYPE: ICD-10-CM

## 2019-04-11 DIAGNOSIS — M15.9 PRIMARY OSTEOARTHRITIS INVOLVING MULTIPLE JOINTS: ICD-10-CM

## 2019-04-11 DIAGNOSIS — D75.839 THROMBOCYTOSIS: ICD-10-CM

## 2019-04-11 PROCEDURE — G0439 PPPS, SUBSEQ VISIT: HCPCS | Mod: S$GLB,,, | Performed by: NURSE PRACTITIONER

## 2019-04-11 PROCEDURE — 3078F DIAST BP <80 MM HG: CPT | Mod: CPTII,S$GLB,, | Performed by: NURSE PRACTITIONER

## 2019-04-11 PROCEDURE — 99999 PR PBB SHADOW E&M-EST. PATIENT-LVL V: CPT | Mod: PBBFAC,HCNC,, | Performed by: NURSE PRACTITIONER

## 2019-04-11 PROCEDURE — G0439 PR MEDICARE ANNUAL WELLNESS SUBSEQUENT VISIT: ICD-10-PCS | Mod: S$GLB,,, | Performed by: NURSE PRACTITIONER

## 2019-04-11 PROCEDURE — 3078F PR MOST RECENT DIASTOLIC BLOOD PRESSURE < 80 MM HG: ICD-10-PCS | Mod: CPTII,S$GLB,, | Performed by: NURSE PRACTITIONER

## 2019-04-11 PROCEDURE — 99999 PR PBB SHADOW E&M-EST. PATIENT-LVL V: ICD-10-PCS | Mod: PBBFAC,HCNC,, | Performed by: NURSE PRACTITIONER

## 2019-04-11 PROCEDURE — 3075F SYST BP GE 130 - 139MM HG: CPT | Mod: CPTII,S$GLB,, | Performed by: NURSE PRACTITIONER

## 2019-04-11 PROCEDURE — 99499 RISK ADDL DX/OHS AUDIT: ICD-10-PCS | Mod: S$GLB,,, | Performed by: NURSE PRACTITIONER

## 2019-04-11 PROCEDURE — 99499 UNLISTED E&M SERVICE: CPT | Mod: S$GLB,,, | Performed by: NURSE PRACTITIONER

## 2019-04-11 PROCEDURE — 3075F PR MOST RECENT SYSTOLIC BLOOD PRESS GE 130-139MM HG: ICD-10-PCS | Mod: CPTII,S$GLB,, | Performed by: NURSE PRACTITIONER

## 2019-04-11 NOTE — PROGRESS NOTES
"I offered to discuss end of life issues, including information on how to make advance directives that the patient could use to name someone who would make medical decisions on their behalf if they became too ill to make themselves.    ___Patient declined  _X_Patient  Has completed  Leonid Delacruz presented for a  Medicare AWV and comprehensive Health Risk Assessment today. The following components were reviewed and updated:    · Medical history  · Family History  · Social history  · Allergies and Current Medications  · Health Risk Assessment  · Health Maintenance  · Care Team     ** See Completed Assessments for Annual Wellness Visit within the encounter summary.**       The following assessments were completed:  · Living Situation  · CAGE  · Depression Screening  · Timed Get Up and Go  · Whisper Test  · Cognitive Function Screening  · Nutrition Screening  · ADL Screening  · PAQ Screening    Vitals:    04/11/19 0835   BP: 138/70   Pulse: 94   Resp: 15   SpO2: 96%   Weight: 79.8 kg (176 lb)   Height: 5' 9.5" (1.765 m)     Body mass index is 25.62 kg/m².  Physical Exam   Constitutional: He is oriented to person, place, and time. He appears well-developed and well-nourished.   HENT:   Head: Normocephalic and atraumatic.   Right Ear: External ear normal.   Left Ear: External ear normal.   Eyes: No scleral icterus.   Cardiovascular: Normal rate, regular rhythm and normal heart sounds.   No murmur heard.  Pulmonary/Chest: Effort normal. No respiratory distress. He has no decreased breath sounds. He has wheezes. He has no rhonchi. He has no rales.   Diminished bilaterally- sats 96% room air   Abdominal: Soft. Bowel sounds are normal.   protrudent   Musculoskeletal: Normal range of motion. He exhibits no edema.   Neurological: He is alert and oriented to person, place, and time. He exhibits normal muscle tone.   Skin: Skin is warm and dry. He is not diaphoretic. No pallor.   Psychiatric: He has a normal mood and affect. His " speech is normal and behavior is normal. Judgment and thought content normal.   Nursing note and vitals reviewed.        Diagnoses and health risks identified today and associated recommendations/orders:    1. Degeneration of lumbar or lumbosacral intervertebral disc  Stable- followed by PCP    2. Primary osteoarthritis involving multiple joints  Stable- followed by PCP    3. Seasonal allergic rhinitis, unspecified trigger  Stable- followed by PCP    4. Gastroesophageal reflux disease with hiatal hernia  Stable- followed by PCP    5. Degenerative retinal drusen of both eyes - Both Eyes  Stable- followed by PCP    6. Benign non-nodular prostatic hyperplasia without lower urinary tract symptoms  Stable- followed by PCP    7. MDD (major depressive disorder), recurrent episode, moderate  Stable- followed by PCP    8. Atherosclerosis of aorta  Stable- followed by PCP    9. CKD (chronic kidney disease) stage 3, GFR 30-59 ml/min  Stable- followed by PCP    10. Senile purpura  Stable- followed by PCP    11. Dyslipidemia  Stable- followed by PCP    12. Lumbar arthropathy  Stable- followed by PCP    13. Diastolic dysfunction without heart failure  Stable- followed by PCP    14. Essential hypertension  Stable- followed by PCP    15. Hypothyroidism, unspecified type  Stable- followed by PCP    16. Vertebral compression fracture  Stable- followed by PCP    17. Other osteoarthritis of spine, thoracic region  Stable- followed by PCP    18. Mild aortic sclerosis  Stable- followed by cardiology    19. Mild mitral and aortic regurgitation  Stable- followed by PCP, cardiology    20. Frequent falls  Stable- followed by PCP    21. Pulmonary hypertension  Stable- followed by PCP, cardiology    22. Pulmonary emphysema, unspecified emphysema type  Stable- followed by PCP    23. Anemia, unspecified type  Stable- followed by PCP    24. Fracture of vertebra due to osteoporosis, sequela  Stable- followed by PCP    25. Thrombocytosis  Stable-  followed by PCP    26. Encounter for preventive health examination  Assessments completed. Preventative health recommendations reviewed.       27. Chronic bilateral low back pain without sciatica  Stable- followed by PCP      Provided Leonid with a 5-10 year written screening schedule and personal prevention plan. Recommendations were developed using the USPSTF age appropriate recommendations. Education, counseling, and referrals were provided as needed. After Visit Summary printed and given to patient which includes a list of additional screenings\tests needed. Fall prevention. He is overdue on pulmonary F/U. He will schedule annual PCP in June    Follow up in about 1 year (around 4/11/2020) for HRA.    Meghan Mckinney NP

## 2019-04-11 NOTE — PATIENT INSTRUCTIONS
Counseling and Referral of Other Preventative  (Italic type indicates deductible and co-insurance are waived)    Patient Name: Leonid Delacruz  Today's Date: 4/11/2019    Health Maintenance       Date Due Completion Date    Pneumococcal Vaccine (65+ Low/Medium Risk) (2 of 2 - PPSV23) Maybe due- defered to Pulmonary or PCP- may have received 2/3/2017    Influenza Vaccine Declined   4/27/2017 (Declined)        Lipid Panel 06/14/2019 6/14/2018    TETANUS VACCINE    Colonoscopy    Pulmonary follow-up  04/20/2025    Declined    Overdue   4/20/2015 6/6/2011        No orders of the defined types were placed in this encounter.    The following information is provided to all patients.  This information is to help you find resources for any of the problems found today that may be affecting your health:                Living healthy guide: www.[facility].[place].[facility]      Understanding Diabetes: www.diabetes.org      Eating healthy: www.cdc.gov/healthyweight      [facility] home safety checklist: www.cdc.gov/steadi/patient.html      Agency on Aging: www.goea.[place].[facility]      Alcoholics anonymous (AA): www.aa.org      Physical Activity: www.palomo.nih.gov/ip0negb      Tobacco use: www.quitwithusla.org     Preventing Falls: Making Changes in Your Living Space  Is your living space filled with hazards that could cause you to fall? Changes can make you safer. They could even save your life. Take a careful look around your home. Change what you can on your own. Hire someone or ask friends or family to help with harder tasks.      Be sure to add a nonslip mat to the inside of your shower or bathtub. Always keep a nightlight on. Keep a clear path from your bed to the bathroom. Move items from higher shelves to lower ones.   Remove hazards  · Remove things that can trip you, like throw rugs, boxes, piles of paper, or cords.  · Nail down rugs or carpeting if you don't want to remove them.  · Don't store items on stairs.  · Keep walkways  clear.  · Clean up spills right away.  · Replace glass tables with wooden ones. They're safer if you fall.  Add safety devices  · Add handrails to both sides of stairs.  · Buy a raised toilet seat.  · Add grab bars near the toilet and in the shower.  · Get grabbers to help you reach things and avoid climbing.  Improve lighting  · Add nightlights to halls, bedrooms, and bathrooms.   · Put light switches at the top and bottom of stairs.  · Be sure each room and flight of stairs has proper lighting.  · Use shades or curtains to cut glare from windows.  · Put flashlights in each room. Replace burned-out bulbs.  · Get glowing light switches for room entrances.  Take other precautions  · Use nonskid floor wax.  · Buy a nonslip mat and a liquid soap dispenser for the shower.  · Tack down carpets or use slip-resistant backing.  · Put most-used items within easy reach.  · Add bright paint or tape on the top front edge of steps.  · Save big jobs, such as moving furniture or other heavy objects, for family or friends.  · Get professional help installing grab bars. They can be unsafe if not installed the right way.  Fix riskier rooms first  Don't tackle everything at once. Focus on one room at a time. The bathroom is a common spot for falls, so you may start there. Or start with a room you spend lots of time in, such as your bedroom. Make only a few changes at once. This will give you time to adjust to them.     Outside your home  You might arrange for these changes yourself, or you might need to talk to your  or homeowners' association about them.  · Have loose boards on porches or damaged stairs repaired.  · Have rough edges, holes, or large cracks in sidewalks or driveways repaired.  · Have hazards that could trip you, such as hoses or suha, removed.  · Use high-wattage light bulbs (100 or greater) near outside doors and stairs.  · Get handrails added to outside stairs. Have them extend beyond the bottom  "step.  · Get help in winter weather with ice or snow removal.   Date Last Reviewed: 6/12/2015  © 0461-7994 [employer]. [address] [place] [address], [place] [address]. All rights reserved. This information is not intended as a substitute for professional medical care. Always follow your healthcare professional's instructions.        Exercises to Prevent Falls  Certain types of exercises may help make you less likely to fall. Try the ones below. Or do other exercises that your health care provider suggests. Depending on your health, you may need to start slowly. Don't let that stop you. Even small amounts of exercise can help you. Be sure to talk to your health care provider before starting any exercise program.       Improve balance  Many types of exercise can help improve balance. Rg chi and yoga are good examples. Here's another one to try. You can do it anytime and almost anywhere.  · Stand next to a counter or solid support.  · Push yourself up onto your tiptoes.  · Hold for 5 seconds. If you start to lose your balance, hold on to the counter.  · Rest and repeat 5 times. Work up to holding for 20 to 30 seconds, if you can. Increase flexibility  Being more flexible makes it easier for you to move around safely. Try exercises like the seated hamstring stretch.  · Sit in a chair and put one foot on a stool.  · Straighten your leg and reach with both hands down either side of your leg. Reach as far down your leg as you can.  · Hold for about 20 seconds.  · Go back to the starting position. Then repeat 5 times. Switch legs. Build strength  "Resistance" exercises help build strength. You can do them without equipment. Or you can use weights, elastic bands, or special machines. One such exercise is called the biceps curl. You can hold a 1-pound weight or even a can of soup. Do this exercise at least 3 times a week. Strive for every day.  · Sit up straight in a chair.  · Keep your elbow close to your body and " your wrist straight.  · Bend your arm, moving your hand up to your shoulder. Then slowly lower your arm.  · Repeat 5 times. Switch to the other arm.   Build your staying power  Aerobic exercises make your heart and lungs stronger so you can keep moving longer. Walking and swimming are two of the best types of exercises you can do. Using a stationary bike is great, too. Find an aerobic exercise that you enjoy. Start slowly and build up. Even 5 minutes is helpful. Aim for a goal of 30 minutes, at least 3 times a week. You don't have to do 30 minutes in 1 session. Break it up and walk a little throughout the day.  More helpful tips  · Start easy. Slowly work up to doing more.  · Talk with your health care provider about the best exercises for you.  · Call senior centers or health clubs about exercise programs.  · If needed, have a family member watch you walk every so often to check your stability.  · Exercise with a friend. Choose an activity you both enjoy.  · Consider lon chi or yoga to strengthen your balance.  · Try exercises that you can do anytime, anywhere. Here are 2 examples. Have someone with you when you first try these:  ¨ Practice walking by placing 1 foot right in front of the other.  ¨ Stand up and sit down 10 times. Repeat this throughout the day.   Date Last Reviewed: 6/13/2015  © 9520-2609 [employer]. [address] [place] [address], [place] [address]. All rights reserved. This information is not intended as a substitute for professional medical care. Always follow your healthcare professional's instructions.

## 2019-04-11 NOTE — TELEPHONE ENCOUNTER
----- Message from Talita Hong sent at 4/11/2019  9:51 AM CDT -----  Contact: Patient - [phone]  Doctor appointment and lab have been scheduled.  Please link lab orders to the lab appointment.  Date of doctor appointment:    Date of lab appointment:  Labs: [id number]  Physical or EP: Physical [id number]  Comments:     Thanks Stella

## 2019-04-15 DIAGNOSIS — J41.0 SIMPLE CHRONIC BRONCHITIS: ICD-10-CM

## 2019-04-15 DIAGNOSIS — R06.02 SOB (SHORTNESS OF BREATH): ICD-10-CM

## 2019-04-15 RX ORDER — BUDESONIDE AND FORMOTEROL FUMARATE DIHYDRATE 160; 4.5 UG/1; UG/1
2 AEROSOL RESPIRATORY (INHALATION) 2 TIMES DAILY
Qty: 10.2 G | Refills: 11 | Status: SHIPPED | OUTPATIENT
Start: 2019-04-15 | End: 2020-06-13 | Stop reason: SDUPTHER

## 2019-04-15 RX ORDER — ALBUTEROL SULFATE 90 UG/1
AEROSOL, METERED RESPIRATORY (INHALATION)
Qty: 3 INHALER | Refills: 3 | Status: SHIPPED | OUTPATIENT
Start: 2019-04-15 | End: 2020-06-29

## 2019-05-04 RX ORDER — AMLODIPINE BESYLATE 5 MG/1
5 TABLET ORAL DAILY
Qty: 30 TABLET | Refills: 3 | OUTPATIENT
Start: 2019-05-04 | End: 2020-05-03

## 2019-05-07 RX ORDER — AMLODIPINE BESYLATE 5 MG/1
5 TABLET ORAL DAILY
Qty: 30 TABLET | Refills: 3 | OUTPATIENT
Start: 2019-05-07 | End: 2020-05-06

## 2019-06-10 RX ORDER — LEVOTHYROXINE SODIUM 75 UG/1
TABLET ORAL
Qty: 90 TABLET | Refills: 3 | Status: SHIPPED | OUTPATIENT
Start: 2019-06-10 | End: 2019-06-12

## 2019-06-12 RX ORDER — LEVOTHYROXINE SODIUM 75 UG/1
TABLET ORAL
Qty: 90 TABLET | Refills: 3 | Status: SHIPPED | OUTPATIENT
Start: 2019-06-12 | End: 2020-09-08

## 2019-06-12 RX ORDER — SERTRALINE HYDROCHLORIDE 100 MG/1
TABLET, FILM COATED ORAL
Qty: 90 TABLET | Refills: 3 | Status: SHIPPED | OUTPATIENT
Start: 2019-06-12 | End: 2019-09-23

## 2019-07-10 RX ORDER — TAMSULOSIN HYDROCHLORIDE 0.4 MG/1
CAPSULE ORAL
Qty: 90 CAPSULE | Refills: 3 | Status: SHIPPED | OUTPATIENT
Start: 2019-07-10 | End: 2020-06-01

## 2019-09-23 DIAGNOSIS — K44.9 GASTROESOPHAGEAL REFLUX DISEASE WITH HIATAL HERNIA: ICD-10-CM

## 2019-09-23 DIAGNOSIS — K21.9 GASTROESOPHAGEAL REFLUX DISEASE WITH HIATAL HERNIA: ICD-10-CM

## 2019-09-23 RX ORDER — OMEPRAZOLE 40 MG/1
CAPSULE, DELAYED RELEASE ORAL
Qty: 180 CAPSULE | Refills: 1 | Status: SHIPPED | OUTPATIENT
Start: 2019-09-23 | End: 2020-10-26

## 2019-09-23 RX ORDER — SERTRALINE HYDROCHLORIDE 100 MG/1
TABLET, FILM COATED ORAL
Qty: 90 TABLET | Refills: 1 | Status: SHIPPED | OUTPATIENT
Start: 2019-09-23 | End: 2020-09-05

## 2020-03-17 ENCOUNTER — PES CALL (OUTPATIENT)
Dept: ADMINISTRATIVE | Facility: CLINIC | Age: 85
End: 2020-03-17

## 2020-07-16 ENCOUNTER — TELEPHONE (OUTPATIENT)
Dept: INTERNAL MEDICINE | Facility: CLINIC | Age: 85
End: 2020-07-16

## 2020-07-16 RX ORDER — AZITHROMYCIN 250 MG/1
TABLET, FILM COATED ORAL
Qty: 6 TABLET | Refills: 0 | Status: SHIPPED | OUTPATIENT
Start: 2020-07-16 | End: 2020-07-21

## 2020-07-16 NOTE — TELEPHONE ENCOUNTER
I spoke to pt and notified him z pack was sent to his pharmacy.  Pt informed me he just received a call from the pharm. Rx was ready.

## 2020-07-16 NOTE — TELEPHONE ENCOUNTER
----- Message from Hafsa Mcdonald sent at 7/16/2020  8:27 AM CDT -----  Contact: self/[phone]  .1 Patient would like to get medical advice.  Symptoms (please be specific): congestion on the lung.   How long has patient had these symptoms: a week and half  Pharmacy name and phone#:[id number] - [place] - [address] [facility] AT [facility] OF [facility] & [address] [facility] [phone] (Phone) [phone] (Fax)  Any drug allergies: onfile  Comments: Patient would like to get medical advice. Patient is asking for rx to be sent to the pharmacy. Thank you

## 2020-08-02 ENCOUNTER — PATIENT OUTREACH (OUTPATIENT)
Dept: ADMINISTRATIVE | Facility: OTHER | Age: 85
End: 2020-08-02

## 2020-08-26 ENCOUNTER — TELEPHONE (OUTPATIENT)
Dept: INTERNAL MEDICINE | Facility: CLINIC | Age: 85
End: 2020-08-26

## 2020-08-26 NOTE — TELEPHONE ENCOUNTER
Patient refused to go through the triage nurse, phone staff called me and I accepted the call .  Patient has had for 2 weeks lung pain when he especially when he moves his right arm. Patient was notified Dr. Parkinson will be out until Aug 31 st.     Reports a history of COPD-- wants to be seen tomorrow and refuses to go to the ED    Spoke to Dr. Parkinson patient is to go to the  Urgent care [facility]. Patient stated he is going to go now .

## 2020-08-29 ENCOUNTER — OFFICE VISIT (OUTPATIENT)
Dept: URGENT CARE | Facility: CLINIC | Age: 85
End: 2020-08-29
Payer: MEDICARE

## 2020-08-29 VITALS
OXYGEN SATURATION: 93 % | WEIGHT: 176 LBS | SYSTOLIC BLOOD PRESSURE: 131 MMHG | DIASTOLIC BLOOD PRESSURE: 64 MMHG | TEMPERATURE: 98 F | BODY MASS INDEX: 27.62 KG/M2 | RESPIRATION RATE: 18 BRPM | HEIGHT: 67 IN | HEART RATE: 100 BPM

## 2020-08-29 DIAGNOSIS — R06.02 SOB (SHORTNESS OF BREATH): Primary | ICD-10-CM

## 2020-08-29 LAB
CTP QC/QA: YES
SARS-COV-2 RDRP RESP QL NAA+PROBE: NEGATIVE

## 2020-08-29 PROCEDURE — 71046 X-RAY EXAM CHEST 2 VIEWS: CPT | Mod: FY,S$GLB,, | Performed by: RADIOLOGY

## 2020-08-29 PROCEDURE — 96372 PR INJECTION,THERAP/PROPH/DIAG2ST, IM OR SUBCUT: ICD-10-PCS | Mod: S$GLB,,, | Performed by: FAMILY MEDICINE

## 2020-08-29 PROCEDURE — 71046 XR CHEST PA AND LATERAL: ICD-10-PCS | Mod: FY,S$GLB,, | Performed by: RADIOLOGY

## 2020-08-29 PROCEDURE — 96372 THER/PROPH/DIAG INJ SC/IM: CPT | Mod: S$GLB,,, | Performed by: FAMILY MEDICINE

## 2020-08-29 PROCEDURE — 99499 RISK ADDL DX/OHS AUDIT: ICD-10-PCS | Mod: S$GLB,,, | Performed by: NURSE PRACTITIONER

## 2020-08-29 PROCEDURE — 99204 PR OFFICE/OUTPT VISIT, NEW, LEVL IV, 45-59 MIN: ICD-10-PCS | Mod: 25,S$GLB,, | Performed by: NURSE PRACTITIONER

## 2020-08-29 PROCEDURE — 99204 OFFICE O/P NEW MOD 45 MIN: CPT | Mod: 25,S$GLB,, | Performed by: NURSE PRACTITIONER

## 2020-08-29 PROCEDURE — 99499 UNLISTED E&M SERVICE: CPT | Mod: S$GLB,,, | Performed by: NURSE PRACTITIONER

## 2020-08-29 RX ORDER — PREDNISONE 10 MG/1
10 TABLET ORAL DAILY
Qty: 7 TABLET | Refills: 0 | Status: SHIPPED | OUTPATIENT
Start: 2020-08-29

## 2020-08-29 RX ORDER — AMOXICILLIN AND CLAVULANATE POTASSIUM 875; 125 MG/1; MG/1
1 TABLET, FILM COATED ORAL 2 TIMES DAILY
Qty: 28 TABLET | Refills: 0 | Status: SHIPPED | OUTPATIENT
Start: 2020-08-29 | End: 2020-09-12

## 2020-08-29 NOTE — PROGRESS NOTES
"Subjective:       Patient ID: Leonid Delacruz Jr. is a 85 y.o. male.    Vitals:  height is 5' 7" (1.702 m) and weight is 79.8 kg (176 lb). His temperature is 98.1 °F (36.7 °C). His blood pressure is 131/64 and his pulse is 100. His respiration is 18 and oxygen saturation is 93% (abnormal).     Chief Complaint: Shortness of Breath    Shortness of Breath  This is a chronic problem. The current episode started 1 to 4 weeks ago. The problem occurs constantly. The problem has been gradually worsening. Associated symptoms include chest pain. Pertinent negatives include no ear pain, fever, hemoptysis, rash, sore throat, sputum production, vomiting or wheezing. The symptoms are aggravated by any activity. He has tried steroid inhalers and ipratropium inhalers for the symptoms. The treatment provided no relief. His past medical history is significant for COPD.   had spoken to his PCP 8/26 and was told at that time to go to the ER, he refused, told to go to urgent care and said he would go, here today 8/29.  He had a fall in June injuring his right arm, shoulder, and chest and an abrasion on his forehead, did not seek medical attention, still having right sided rib pain along with chronic back pain.       Constitution: Negative for chills, sweating, fatigue and fever.   HENT: Negative for ear pain, congestion, sinus pain, sinus pressure, sore throat and voice change.    Neck: Negative for painful lymph nodes.   Cardiovascular: Positive for chest pain.   Eyes: Negative for eye redness.   Respiratory: Positive for COPD and shortness of breath. Negative for chest tightness, cough, sputum production, bloody sputum, stridor, wheezing and asthma.    Gastrointestinal: Negative for nausea and vomiting.   Musculoskeletal: Negative for muscle ache.   Skin: Negative for rash.   Allergic/Immunologic: Negative for seasonal allergies and asthma.   Hematologic/Lymphatic: Negative for swollen lymph nodes.       Objective:      Physical Exam "   Constitutional: He is oriented to person, place, and time. He appears well-developed. He is cooperative.  Non-toxic appearance. He appears ill. No distress.   HENT:   Head: Normocephalic and atraumatic.   Ears:   Right Ear: Hearing, tympanic membrane, external ear and ear canal normal.   Left Ear: Hearing, tympanic membrane, external ear and ear canal normal.   Nose: Nose normal. No mucosal edema, rhinorrhea or nasal deformity. No epistaxis. Right sinus exhibits no maxillary sinus tenderness and no frontal sinus tenderness. Left sinus exhibits no maxillary sinus tenderness and no frontal sinus tenderness.   Mouth/Throat: Uvula is midline, oropharynx is clear and moist and mucous membranes are normal. No trismus in the jaw. Normal dentition. No uvula swelling. No oropharyngeal exudate, posterior oropharyngeal edema or posterior oropharyngeal erythema.   Eyes: Conjunctivae and lids are normal. No scleral icterus.   Neck: Trachea normal, full passive range of motion without pain and phonation normal. Neck supple. No neck rigidity. No edema and no erythema present.   Cardiovascular: Normal rate, regular rhythm, normal heart sounds and normal pulses.   Pulmonary/Chest: Effort normal and breath sounds normal. No respiratory distress. He has no decreased breath sounds. He has no rhonchi.   Abdominal: Normal appearance.   Musculoskeletal: Normal range of motion.         General: No deformity.   Neurological: He is alert and oriented to person, place, and time. He exhibits normal muscle tone. Coordination normal.   Skin: Skin is warm, dry, intact, not diaphoretic and not pale. Psychiatric: His speech is normal and behavior is normal. Judgment and thought content normal.   Nursing note and vitals reviewed.        Assessment:         SOB    COPD   hx of fall   chronic back pain   Plan:         Explained to pt and son that he probably needs oxygen at home, pulse ox at rest 92%, says he can not walk very far due to SOB, does  not have nebulizwer at home, explained to him that he would have to see his PCP to attempt to get these supplies at home, he says in past he did no quality for home oxygen due to sats being too high , strongly enc him to go to ER and also spoke with son who agrees but has not been able to get him to go to the ER    CXR:       The lungs are hyperexpanded with flattening of the hemidiaphragms suggesting COPD change with bibasilar subsegmental atelectasis.  No consolidation is seen.  The heart is mildly enlarged.  The bones are osteopenic.  Age-indeterminate compression fractures involving midthoracic vertebra which were not seen on previous MRI of 05/30/2018.  Remote healing left-sided anterior rib fractures.    Results for orders placed or performed in visit on 08/29/20   POCT COVID-19 Rapid Screening   Result Value Ref Range    POC Rapid COVID Negative Negative     Acceptable Yes         Leonid was seen today for shortness of breath.    Diagnoses and all orders for this visit:    SOB (shortness of breath)  -     XR CHEST PA AND LATERAL; Future  -     POCT COVID-19 Rapid Screening    Other orders  -     methylPREDNISolone sod suc(PF) injection 125 mg  -     amoxicillin-clavulanate 875-125mg (AUGMENTIN) 875-125 mg per tablet; Take 1 tablet by mouth 2 (two) times daily. for 14 days  -     predniSONE (DELTASONE) 10 MG tablet; Take 1 tablet (10 mg total) by mouth once daily.        Education  Pt has been given instructions populated from [employer] database and those entered into patient instructions field and has verbalized understanding of the after visit summary and information contained wherein.    Follow Up  Advised times 3 he should go to the ER but he refuses, son with him and he thinks he should go but feels like he has to follow his wishes, says he will call his pcp on Monday     In Case of Emergency   May go to ER for acute shortness of breath, lightheadedness, fever, or any other emergent complaints  or changes in condition.

## 2020-08-29 NOTE — PATIENT INSTRUCTIONS
Shortness of Breath (Dyspnea)  Shortness of breath is the feeling that you can't catch your breath or get enough air. It is also known as dyspnea.  Dyspnea can be caused by many different conditions. They include:  · Acute asthma attack.  · Worsening of chronic lung diseases such as chronic bronchitis and emphysema.  · Heart failure. This is when weak heart muscle allows extra fluid to collect in the lungs.  · Panic attacks or anxiety. Fear can cause rapid breathing (hyperventilation).  · Pneumonia, or an infection in the lung tissue.  · Exposure to toxic substances, fumes, smoke, or certain medicines.  · Blood clot in the lung (pulmonary embolism). This is often from a piece of blood clot in a deep vein of the leg (deep vein thrombosis) that breaks off and travels to the lungs.  · Heart attack or heart-related chest pain (angina).  · Anemia.  · Collapsed lung (pneumothorax).  · Dehydration.  · Pregnancy.  Based on your visit today, the exact cause of your shortness of breath is not certain. Your tests dont show any of the serious causes of dyspnea. You may need other tests to find out if you have a serious problem. Its important to watch for any new symptoms or symptoms that get worse. Follow up with your healthcare provider as directed.  Home care  Follow these tips to take care of yourself at home:  · When your symptoms are better, go back to your usual activities.  · If you smoke, you should stop. Join a quit-smoking program or ask your healthcare provider for help.  · Eat a healthy diet and get plenty of sleep.  · Get regular exercise. Talk with your healthcare provider before starting to exercise, especially if you have other medical problems.  · Cut down on the amount of caffeine and stimulants you consume.  Follow-up care  Follow up with your healthcare provider, or as advised.  If tests were done, you will be told if your treatment needs to be changed. You can call as directed for the results.  (Note:  If an X-ray was taken, a specialist will review it. You will be notified of any new findings that may affect your care.)  Call 911 or get immediate medical care  Shortness of breath may be a sign of a serious medical problem. For example, it may be a problem with your heart or lungs. Call 911 if you have worsening shortness of breath or trouble breathing, especially with any of the symptoms below:  · You are confused or its difficult to wake you.  · You faint or lose consciousness.  · You have a fast heartbeat, or your heartbeat is irregular.  · You are coughing up blood.  · You have pain in your chest, arm, shoulder, neck, or upper back.  · You break out in a sweat.  When to seek medical advice  Call your healthcare provider right away if any of these occur:  · Slight shortness of breath or wheezing  · Redness, pain or swelling in your leg, arm, or other body area  · Swelling in both legs or ankles  · Fast weight gain  · Dizziness or weakness  · Fever of 100.4ºF (38ºC) or higher, or as directed by your healthcare provider  Date Last Reviewed: 9/13/2015  © 7885-1022 [employer]. [address] [place] [address]. All rights reserved. This information is not intended as a substitute for professional medical care. Always follow your healthcare professional's instructions.        COPD Flare    You have had a flare-up of your COPD.  COPD, or chronic obstructive pulmonary disease, is a common lung disease. It causes your airways to become irritated and narrower. This makes it harder for you to breathe. Emphysema and chronic bronchitis are both types of COPD. This is a chronic condition, which means you always have it. Sometimes it gets worse. When this happens, it is called a flare-up.  Symptoms of COPD  People with COPD may have symptoms most of the time. In a flare-up, your symptoms get worse. These symptoms may mean you are having a flare-up:  · Shortness of breath, shallow or rapid breathing,  or wheezing that gets worse  · Lung infection  · Cough that gets worse  · More mucus, thicker mucus or mucus of a different color  · Tiredness, decreased energy, or trouble doing your usual activities  · Fever  · Chest tightness  · Your symptoms dont get better even when you use your usual medicines, inhalers, and nebulizer  · Trouble talking  · You feel confused  Causes of flare-ups  Unfortunately, a flare-up can happen even though you did everything right, and you followed your doctors instructions. Some causes of flare-ups are:  · Smoking or secondhand smoke  · Colds, the flu, or respiratory infections  · Air pollution  · Sudden change in the weather  · Dust, irritating chemicals, or strong fumes  · Not taking your medicines as prescribed  Home care  Here are some things you can do at home to treat a flare-up:  · Try not to panic. This makes it harder to breathe, and keeps you from doing the right things.  · Dont smoke or be around others who are smoking.  · Try to drink more fluids than usual during a flare-up, unless your doctor has told you not to because of heart and kidney problems. More fluids can help loosen the mucus.  · Use your inhalers and nebulizer, if you have one, as you have been told to.  · If you were given antibiotics, take them until they are used up or your doctor tells you to stop. Its important to finish the antibiotics, even though you feel better. This will make sure the infection has cleared.  · If you were given prednisone or another steroid, finish it even if you feel better.  Preventing a flare-up  Even though flare-ups happen, the best way to treat one is to prevent it before it starts. Here are some pointers:  · Dont smoke or be around others who are smoking.  · Take your medicines as you have been told.  · Talk with your doctor about getting a flu shot every year. Also find out if you need a pneumonia shot.  · If there is a weather advisory warning to stay indoors, try to stay  inside when possible.  · Try to eat healthy and get plenty of sleep.  · Try to avoid things that usually set you off, like dust, chemical fumes, hairsprays, or strong perfumes.  Follow-up care  Follow up with your healthcare provider, or as advised.  If a culture was done, you will be told if your treatment needs to be changed. You can call as directed for the results.  If X-rays were done, you will be notified of any new findings that may affect your care.  Call 911  Call 911 if any of these occur:  · You have trouble breathing  · You feel confused or its difficult to wake you up  · You faint or lose consciousness  · You have a rapid heart rate  · You have new pain in your chest, arm, shoulder, neck or upper back  When to seek medical advice  Call your healthcare provider right away if any of these occur:  · Wheezing or shortness of breath gets worse  · You need to use your inhalers more often than usual without relief  · Fever of 100.4°F (38ºC) or higher, or as directed by your healthcare provider  · Coughing up lots of dark-colored or bloody mucus (sputum)  · Chest pain with each breath  · You do not start to get better within 24 hours  · Swelling of your ankles gets worse  · Dizziness or weakness  Date Last Reviewed: 9/1/2016  © 2579-6020 [employer]. [address] [place] [address], [place] [address]. All rights reserved. This information is not intended as a substitute for professional medical care. Always follow your healthcare professional's instructions.

## 2020-08-31 DIAGNOSIS — M54.9 CHRONIC BILATERAL BACK PAIN, UNSPECIFIED BACK LOCATION: Primary | ICD-10-CM

## 2020-08-31 DIAGNOSIS — G89.29 CHRONIC BILATERAL BACK PAIN, UNSPECIFIED BACK LOCATION: Primary | ICD-10-CM

## 2020-08-31 DIAGNOSIS — J44.9 CHRONIC OBSTRUCTIVE PULMONARY DISEASE, UNSPECIFIED COPD TYPE: ICD-10-CM

## 2020-08-31 RX ORDER — PEN NEEDLE, DIABETIC 31 GX5/16"
NEEDLE, DISPOSABLE MISCELLANEOUS
Qty: 1 EACH | Refills: 0 | Status: SHIPPED | OUTPATIENT
Start: 2020-08-31

## 2020-08-31 RX ORDER — OXYCODONE AND ACETAMINOPHEN 5; 325 MG/1; MG/1
1 TABLET ORAL EVERY 8 HOURS PRN
Qty: 45 TABLET | Refills: 0 | Status: SHIPPED | OUTPATIENT
Start: 2020-08-31 | End: 2020-09-22 | Stop reason: SDUPTHER

## 2020-08-31 RX ORDER — IPRATROPIUM BROMIDE AND ALBUTEROL SULFATE 2.5; .5 MG/3ML; MG/3ML
3 SOLUTION RESPIRATORY (INHALATION) EVERY 6 HOURS PRN
Qty: 1 BOX | Refills: 3 | Status: SHIPPED | OUTPATIENT
Start: 2020-08-31 | End: 2021-08-31

## 2020-08-31 NOTE — TELEPHONE ENCOUNTER
----- Message from Holly Schilling sent at 8/31/2020  9:23 AM CDT -----  Regarding: medication  Contact: patient [phone]  Patient is requesting pain pill. Has broken rib      [id number] - [place] - [address] [facility] AT [facility] OF [employer] [facility] & [facility] [phone] (Phone)  [phone] (Fax)    Please call

## 2020-08-31 NOTE — TELEPHONE ENCOUNTER
Pt stated that he is still having pain from a fall years ago. Pt has chronic back pain from this.     Pt was evaluated in [facility] on 8/29/20 for SOB. Pt has hx of COPD and was dx with rib fracture.     Pt stated that he just needs pain meds. Previously given Norco 7.5mg 1/7/20 #60, stated that it barely helps.     Pt also stated that he needs a nebulizer to help his breathing.

## 2020-08-31 NOTE — TELEPHONE ENCOUNTER
----- Message from Cadence Graf sent at 8/31/2020  8:30 AM CDT -----  The patient wants to know if you can see him today. He only wants to see you.     Thank you

## 2020-08-31 NOTE — TELEPHONE ENCOUNTER
I spoke to pt and he was notified the Rx's below was sent to his pharmacy.    [place] changed to Percocet, if pain persist he needs to f/u with Pain Management   Nebulizer sent with Margot

## 2020-08-31 NOTE — TELEPHONE ENCOUNTER
[place] changed to Percocet, if pain persist he needs to f/u with Pain Management   Nebulizer sent with Margot

## 2020-09-01 NOTE — PROGRESS NOTES
Assessment /Plan     For exam results, see Encounter Report.    Right posterior capsular opacification  -     Ambulatory referral/consult to Ophthalmology; Future; Expected date: 09/21/2020    Early dry stage nonexudative age-related macular degeneration of both eyes    Pseudophakia of both eyes    Amblyopia, strabismic, left    1. Refer to Dr. Perez for evaluation and possible removal.   2. Mild, Dry, Monitor condition. Recommended eye vitamins. Patient to report any changes. RTC to recheck dfe and macula, consider OCT.  3,4. Monitor condition. Patient to report any changes. RTC 1 year recheck.

## 2020-09-05 RX ORDER — SERTRALINE HYDROCHLORIDE 100 MG/1
TABLET, FILM COATED ORAL
Qty: 90 TABLET | Refills: 1 | Status: SHIPPED | OUTPATIENT
Start: 2020-09-05

## 2020-09-08 RX ORDER — LEVOTHYROXINE SODIUM 75 UG/1
TABLET ORAL
Qty: 90 TABLET | Refills: 3 | Status: SHIPPED | OUTPATIENT
Start: 2020-09-08

## 2020-09-10 ENCOUNTER — PATIENT OUTREACH (OUTPATIENT)
Dept: ADMINISTRATIVE | Facility: OTHER | Age: 85
End: 2020-09-10

## 2020-09-14 ENCOUNTER — OFFICE VISIT (OUTPATIENT)
Dept: OPTOMETRY | Facility: CLINIC | Age: 85
End: 2020-09-14
Payer: COMMERCIAL

## 2020-09-14 DIAGNOSIS — Z96.1 PSEUDOPHAKIA OF BOTH EYES: ICD-10-CM

## 2020-09-14 DIAGNOSIS — H53.032 AMBLYOPIA, STRABISMIC, LEFT: ICD-10-CM

## 2020-09-14 DIAGNOSIS — H35.3131 EARLY DRY STAGE NONEXUDATIVE AGE-RELATED MACULAR DEGENERATION OF BOTH EYES: ICD-10-CM

## 2020-09-14 DIAGNOSIS — H26.491 RIGHT POSTERIOR CAPSULAR OPACIFICATION: Primary | ICD-10-CM

## 2020-09-14 PROCEDURE — 92014 COMPRE OPH EXAM EST PT 1/>: CPT | Mod: S$GLB,,, | Performed by: OPTOMETRIST

## 2020-09-14 PROCEDURE — 99999 PR PBB SHADOW E&M-EST. PATIENT-LVL IV: ICD-10-PCS | Mod: PBBFAC,,, | Performed by: OPTOMETRIST

## 2020-09-14 PROCEDURE — 99999 PR PBB SHADOW E&M-EST. PATIENT-LVL IV: CPT | Mod: PBBFAC,,, | Performed by: OPTOMETRIST

## 2020-09-14 PROCEDURE — 92014 PR EYE EXAM, EST PATIENT,COMPREHESV: ICD-10-PCS | Mod: S$GLB,,, | Performed by: OPTOMETRIST

## 2020-09-14 RX ORDER — PEAK FLOW METER
EACH MISCELLANEOUS
COMMUNITY
Start: 2020-09-01

## 2020-09-18 ENCOUNTER — PES CALL (OUTPATIENT)
Dept: ADMINISTRATIVE | Facility: CLINIC | Age: 85
End: 2020-09-18

## 2020-09-22 ENCOUNTER — TELEPHONE (OUTPATIENT)
Dept: INTERNAL MEDICINE | Facility: CLINIC | Age: 85
End: 2020-09-22

## 2020-09-22 DIAGNOSIS — G89.29 CHRONIC BILATERAL BACK PAIN, UNSPECIFIED BACK LOCATION: ICD-10-CM

## 2020-09-22 DIAGNOSIS — M54.9 CHRONIC BILATERAL BACK PAIN, UNSPECIFIED BACK LOCATION: ICD-10-CM

## 2020-09-22 RX ORDER — OXYCODONE AND ACETAMINOPHEN 5; 325 MG/1; MG/1
1 TABLET ORAL EVERY 8 HOURS PRN
Qty: 45 TABLET | Refills: 0 | Status: SHIPPED | OUTPATIENT
Start: 2020-09-22

## 2020-09-22 NOTE — TELEPHONE ENCOUNTER
----- Message from Irais Szymanski sent at 9/22/2020 12:11 PM CDT -----  Contact: [phone]  Patient is requesting a call back from the nurse in regards to a personal matter. Please call and advise

## 2020-09-22 NOTE — TELEPHONE ENCOUNTER
Hx Chronic back pain, was told he is too old to get surgery, is requesting refill for his oxycodone

## 2020-10-05 ENCOUNTER — PATIENT MESSAGE (OUTPATIENT)
Dept: ADMINISTRATIVE | Facility: HOSPITAL | Age: 85
End: 2020-10-05

## 2020-10-25 ENCOUNTER — PATIENT OUTREACH (OUTPATIENT)
Dept: ADMINISTRATIVE | Facility: OTHER | Age: 85
End: 2020-10-25

## 2020-10-26 ENCOUNTER — OFFICE VISIT (OUTPATIENT)
Dept: OPHTHALMOLOGY | Facility: CLINIC | Age: 85
End: 2020-10-26
Payer: MEDICARE

## 2020-10-26 DIAGNOSIS — H26.491 PCO (POSTERIOR CAPSULAR OPACIFICATION), RIGHT: Primary | ICD-10-CM

## 2020-10-26 DIAGNOSIS — H35.3131 EARLY DRY STAGE NONEXUDATIVE AGE-RELATED MACULAR DEGENERATION OF BOTH EYES: ICD-10-CM

## 2020-10-26 DIAGNOSIS — H52.7 REFRACTIVE ERROR: ICD-10-CM

## 2020-10-26 DIAGNOSIS — D31.31 CHOROIDAL NEVUS OF RIGHT EYE: ICD-10-CM

## 2020-10-26 DIAGNOSIS — K21.9 GASTROESOPHAGEAL REFLUX DISEASE WITH HIATAL HERNIA: ICD-10-CM

## 2020-10-26 DIAGNOSIS — K44.9 GASTROESOPHAGEAL REFLUX DISEASE WITH HIATAL HERNIA: ICD-10-CM

## 2020-10-26 PROCEDURE — 92002 PR EYE EXAM, NEW PATIENT,INTERMED: ICD-10-PCS | Mod: 57,HCNC,S$GLB, | Performed by: OPHTHALMOLOGY

## 2020-10-26 PROCEDURE — 99999 PR PBB SHADOW E&M-EST. PATIENT-LVL IV: ICD-10-PCS | Mod: PBBFAC,HCNC,, | Performed by: OPHTHALMOLOGY

## 2020-10-26 PROCEDURE — 92002 INTRM OPH EXAM NEW PATIENT: CPT | Mod: 57,HCNC,S$GLB, | Performed by: OPHTHALMOLOGY

## 2020-10-26 PROCEDURE — 99999 PR PBB SHADOW E&M-EST. PATIENT-LVL IV: CPT | Mod: PBBFAC,HCNC,, | Performed by: OPHTHALMOLOGY

## 2020-10-26 PROCEDURE — 66821 PR DISCISSION,2ND CATARACT,LASER: ICD-10-PCS | Mod: HCNC,RT,S$GLB, | Performed by: OPHTHALMOLOGY

## 2020-10-26 PROCEDURE — 66821 AFTER CATARACT LASER SURGERY: CPT | Mod: HCNC,RT,S$GLB, | Performed by: OPHTHALMOLOGY

## 2020-10-26 RX ORDER — OMEPRAZOLE 40 MG/1
CAPSULE, DELAYED RELEASE ORAL
Qty: 180 CAPSULE | Refills: 1 | Status: SHIPPED | OUTPATIENT
Start: 2020-10-26

## 2020-10-26 NOTE — PROGRESS NOTES
Subjective:       Patient ID: Leonid Delacruz Jr. is a 85 y.o. male.    Chief Complaint: PCO    HPI     DSL_ 9/14/2020     84 y/o male is here for Yag Laser. H/o of PCO, RT. Pt reports blurry   vision of the RT eye. Pt is accompany by son. Pt forgot glasses at home.     Eyemeds  No gtts      Last edited by Neftali Burton on 10/26/2020  4:03 PM. (History)             Assessment:       1. PCO (posterior capsular opacification), right    2. Choroidal nevus of right eye    3. Early dry stage nonexudative age-related macular degeneration of both eyes    4. Refractive error        Plan:       Visually significant  PCO OD- Pt. Wants Laser.     C. Nevus OD-Stable.  Dry AMD OD-Stable.  RE      YAG CAP right eye (OD) today. TE=   60.9 mj, Avg. 0.7 mj, 87 pulses.  PF taper OD.  AREDS/AG.  RTC 2-3 wks.      YAG laser Capsulotomy Procedure Note:  Informed consent was obtained and correct eye was verified with patient.   One drop of topical Proparacaine 1% was instilled.  YAG laser applied to posterior capsule in cruciate pattern.  One drop of Apraclonidine 0.5% and 1 drop of Pred Acetate 1% applied to eye after laser.  Pt tolerated procedure well. No complications.  Follow up in 2-3 weeks.

## 2020-11-11 ENCOUNTER — TELEPHONE (OUTPATIENT)
Dept: INTERNAL MEDICINE | Facility: CLINIC | Age: 85
End: 2020-11-11

## 2020-11-11 DIAGNOSIS — R06.02 SOB (SHORTNESS OF BREATH): ICD-10-CM

## 2020-11-11 DIAGNOSIS — J41.0 SIMPLE CHRONIC BRONCHITIS: ICD-10-CM

## 2020-11-11 RX ORDER — BUDESONIDE AND FORMOTEROL FUMARATE DIHYDRATE 160; 4.5 UG/1; UG/1
AEROSOL RESPIRATORY (INHALATION)
Qty: 10.2 G | Refills: 11 | Status: SHIPPED | OUTPATIENT
Start: 2020-11-11

## 2020-11-11 RX ORDER — ALBUTEROL SULFATE 90 UG/1
AEROSOL, METERED RESPIRATORY (INHALATION)
Qty: 54 G | Refills: 6 | Status: SHIPPED | OUTPATIENT
Start: 2020-11-11

## 2020-11-11 NOTE — TELEPHONE ENCOUNTER
4-11-19    Patient has COPD and needs his inhalers refilled.    He will call to schedule his annual , he promised

## 2020-11-11 NOTE — TELEPHONE ENCOUNTER
----- Message from Lisa Hammond sent at 11/11/2020  9:08 AM CST -----  Contact: [phone]  Pt would like like call back regarding inhaler -states its too early for refill but he needs to speak with nurse

## 2020-11-12 ENCOUNTER — TELEPHONE (OUTPATIENT)
Dept: INTERNAL MEDICINE | Facility: CLINIC | Age: 85
End: 2020-11-12

## 2020-11-12 DIAGNOSIS — R79.9 ABNORMAL FINDING OF BLOOD CHEMISTRY, UNSPECIFIED: ICD-10-CM

## 2020-11-12 DIAGNOSIS — I10 ESSENTIAL HYPERTENSION: ICD-10-CM

## 2020-11-12 DIAGNOSIS — Z00.00 ENCOUNTER FOR PREVENTIVE HEALTH EXAMINATION: Primary | ICD-10-CM

## 2020-11-12 NOTE — TELEPHONE ENCOUNTER
----- Message from Xiomy Knutson MA sent at 11/11/2020  5:00 PM CST -----  Contact: self     ----- Message -----  From: Katie Bellamy  Sent: 11/11/2020   4:38 PM CST  To: [facility] Staff    Caller is requesting an earlier appt than we can schedule.  Caller declined first available appointment listed below. Caller will not accept being placed on the wait list and is requesting a message be sent to the provider.  When is the next available appointment:  1/15/2021  Reason for the appointment:  annual and medication refill  Patient preference of timeframe to be scheduled:  soon  Comments:      Offered pt appointment with another provider , but pt refused. Please call and advise. Thank you

## 2020-11-12 NOTE — TELEPHONE ENCOUNTER
Patient can barely speak on phone.  He handed phone to son.    He is out of inhalers and needs appt asap.  Last seen jan 2019.  I explained inhalers were refilled late yesterday to pharmacy,& he should call pharmacy and they should be ready for .    I told son to call us back  if not improving once get back on inhalers.  I told son- it is important that he see his pcp dr clancy once a year.    We set up annual and lab appt for January.  Date/time read back and mailed manually.    Orders entered and linked.

## 2020-11-13 ENCOUNTER — TELEPHONE (OUTPATIENT)
Dept: INTERNAL MEDICINE | Facility: CLINIC | Age: 85
End: 2020-11-13

## 2020-11-16 ENCOUNTER — TELEPHONE (OUTPATIENT)
Dept: OPHTHALMOLOGY | Facility: CLINIC | Age: 85
End: 2020-11-16

## 2020-11-16 ENCOUNTER — TELEPHONE (OUTPATIENT)
Dept: INTERNAL MEDICINE | Facility: CLINIC | Age: 85
End: 2020-11-16

## 2020-11-16 NOTE — TELEPHONE ENCOUNTER
----- Message from Iris Lindsay sent at 2020 11:03 AM CST -----  Contact: Emma with [facility]  home   Emma is calling to see if Dr Parkinson would sign will sign the pt death certificate in [place]. Please call and advise.

## 2020-11-16 NOTE — TELEPHONE ENCOUNTER
Spoke to [facility], we have not seen this patient in 2 years per Dr. Parkinson, will not sign the death certificate.

## 2022-04-19 NOTE — TELEPHONE ENCOUNTER
Labs linked   Xelramonaz Pregnancy And Lactation Text: This medication is Pregnancy Category D and is not considered safe during pregnancy.  The risk during breast feeding is also uncertain.

## (undated) DEVICE — DRAPE STERI-DRAPE 1000 17X11IN

## (undated) DEVICE — SYR 30CC LUER LOCK

## (undated) DEVICE — SYR 10CC LUER LOCK

## (undated) DEVICE — SEE MEDLINE ITEM 157116

## (undated) DEVICE — DRESSING TRANS 4X4 3/4

## (undated) DEVICE — BANDAGE ADHESIVE

## (undated) DEVICE — DURAPREP SURG SCRUB 26ML

## (undated) DEVICE — K-WIRE THRD TRCR PT 5MM 1.6X15
Type: IMPLANTABLE DEVICE | Site: FOOT | Status: NON-FUNCTIONAL
Removed: 2017-11-16

## (undated) DEVICE — SEE MEDLINE ITEM 156953

## (undated) DEVICE — SEE MEDLINE ITEM 156955

## (undated) DEVICE — BIT DRILL 2.0MM D DEPTH 110MM

## (undated) DEVICE — COVER OVERHEAD SURG LT BLUE

## (undated) DEVICE — Device

## (undated) DEVICE — PACK AFFIRM CEMENT MIXER

## (undated) DEVICE — MANIFOLD 4 PORT

## (undated) DEVICE — SUT ETHILON 3/0 18IN PS-1

## (undated) DEVICE — DRESSING TELFA N ADH 3X8

## (undated) DEVICE — GLOVE SURGICAL LATEX SZ 7

## (undated) DEVICE — ELECTRODE REM PLYHSV RETURN 9

## (undated) DEVICE — SEE MEDLINE ITEM 146292

## (undated) DEVICE — BANDAGE ESMARK 6X12

## (undated) DEVICE — KIT SPINAL PATIENT CARE JACK

## (undated) DEVICE — UNDERGLOVES BIOGEL PI SIZE 8

## (undated) DEVICE — DRAPE C-ARM/MOBILE XRAY 44X80

## (undated) DEVICE — DRESSING TEGADERM 2 3/8 X 2.75

## (undated) DEVICE — DRAPE C-ARMOR EQUIPMENT COVER

## (undated) DEVICE — SPLINT PLASTER FS 5IN X 30IN

## (undated) DEVICE — PACK BASIC

## (undated) DEVICE — GAUZE SPONGE 4X4 12PLY

## (undated) DEVICE — SUT MONOCRYL 3-0 PS-1

## (undated) DEVICE — APPLICATOR CHLORAPREP ORN 26ML

## (undated) DEVICE — GLOVE BIOGEL ORTHOPEDIC 8

## (undated) DEVICE — DRAPE STERI U-SHAPED 47X51IN

## (undated) DEVICE — SEE MEDLINE ITEM 157117

## (undated) DEVICE — SEE MEDLINE ITEM 146231

## (undated) DEVICE — PAD ABDOMINAL 5X9 STERILE

## (undated) DEVICE — UNDERGLOVES BIOGEL PI SZ 6 LF

## (undated) DEVICE — ADHESIVE MASTISOL VIAL 48/BX

## (undated) DEVICE — GOWN SURGICAL XX LARGE X LONG

## (undated) DEVICE — PAD CAST SPECIALIST STRL 6

## (undated) DEVICE — AFFIRM FILLER DELIVERY PACK

## (undated) DEVICE — BIT BRILL 2.5

## (undated) DEVICE — SEE MEDLINE ITEM 152622

## (undated) DEVICE — CLOSURE SKIN STERI STRIP 1/2X4

## (undated) DEVICE — INSTRUMENT SUCTION FRAZIER 12F

## (undated) DEVICE — DRESSING XEROFORM FOIL PK 1X8

## (undated) DEVICE — DRESSING AQUACEL FOAM 5 X 5

## (undated) DEVICE — DRAPE STERI INSTRUMENT 1018

## (undated) DEVICE — DRAPE PLASTIC U 60X72

## (undated) DEVICE — SEE MEDLINE ITEM 157150

## (undated) DEVICE — SEE MEDLINE ITEM 146308

## (undated) DEVICE — PADDING CAST SPECIALIST 6X4YD

## (undated) DEVICE — ALCOHOL 70% ISOP W/GREEN 16OZ

## (undated) DEVICE — UNDERGLOVES BIOGEL PI SIZE 7.5

## (undated) DEVICE — DRESSING MEPILEX BORDER 4 X 4

## (undated) DEVICE — NDL SPINAL SPINOCAN 22GX3.5

## (undated) DEVICE — DRAPE LAP TIBURON 77X122IN

## (undated) DEVICE — BIT DRILL 3 FLTE QC 2.7X125MM

## (undated) DEVICE — SEE MEDLINE ITEM 152529

## (undated) DEVICE — SPONGE LAP 18X18 PREWASHED

## (undated) DEVICE — DRESSING AQUACEL SACRAL 9 X 9

## (undated) DEVICE — SYR 3CC LUER LOC

## (undated) DEVICE — DRAPE INCISE IOBAN 2 23X23IN

## (undated) DEVICE — SUT MONOCYRL 4-0 PS2 UND

## (undated) DEVICE — GLOVE SURGICAL LATEX SZ 6